# Patient Record
Sex: MALE | Race: WHITE | NOT HISPANIC OR LATINO | Employment: FULL TIME | URBAN - METROPOLITAN AREA
[De-identification: names, ages, dates, MRNs, and addresses within clinical notes are randomized per-mention and may not be internally consistent; named-entity substitution may affect disease eponyms.]

---

## 2020-10-19 ENCOUNTER — APPOINTMENT (EMERGENCY)
Dept: RADIOLOGY | Facility: HOSPITAL | Age: 30
End: 2020-10-19
Payer: COMMERCIAL

## 2020-10-19 ENCOUNTER — HOSPITAL ENCOUNTER (EMERGENCY)
Facility: HOSPITAL | Age: 30
Discharge: HOME | End: 2020-10-19
Attending: EMERGENCY MEDICINE | Admitting: EMERGENCY MEDICINE
Payer: COMMERCIAL

## 2020-10-19 VITALS
HEART RATE: 103 BPM | RESPIRATION RATE: 20 BRPM | BODY MASS INDEX: 32.44 KG/M2 | WEIGHT: 219 LBS | DIASTOLIC BLOOD PRESSURE: 77 MMHG | TEMPERATURE: 98.2 F | HEIGHT: 69 IN | SYSTOLIC BLOOD PRESSURE: 147 MMHG | OXYGEN SATURATION: 98 %

## 2020-10-19 DIAGNOSIS — R26.81 UNSTEADY GAIT: ICD-10-CM

## 2020-10-19 DIAGNOSIS — H53.8 BLURRY VISION, BILATERAL: ICD-10-CM

## 2020-10-19 DIAGNOSIS — R51.9 HEADACHE: Primary | ICD-10-CM

## 2020-10-19 LAB
ANION GAP SERPL CALCULATED.3IONS-SCNC: 7 MMOL/L (ref 4–13)
BASOPHILS # BLD AUTO: 0.07 THOUSANDS/ΜL (ref 0–0.1)
BASOPHILS NFR BLD AUTO: 1 % (ref 0–1)
BUN SERPL-MCNC: 17 MG/DL (ref 5–25)
CALCIUM SERPL-MCNC: 8.9 MG/DL (ref 8.3–10.1)
CHLORIDE SERPL-SCNC: 104 MMOL/L (ref 100–108)
CO2 SERPL-SCNC: 23 MMOL/L (ref 21–32)
CREAT SERPL-MCNC: 1.16 MG/DL (ref 0.6–1.3)
EOSINOPHIL # BLD AUTO: 0.12 THOUSAND/ΜL (ref 0–0.61)
EOSINOPHIL NFR BLD AUTO: 1 % (ref 0–6)
ERYTHROCYTE [DISTWIDTH] IN BLOOD BY AUTOMATED COUNT: 11.8 % (ref 11.6–15.1)
GFR SERPL CREATININE-BSD FRML MDRD: 83 ML/MIN/1.73SQ M
GLUCOSE SERPL-MCNC: 112 MG/DL (ref 65–140)
HCT VFR BLD AUTO: 50.4 % (ref 36.5–49.3)
HGB BLD-MCNC: 17.3 G/DL (ref 12–17)
IMM GRANULOCYTES # BLD AUTO: 0.04 THOUSAND/UL (ref 0–0.2)
IMM GRANULOCYTES NFR BLD AUTO: 0 % (ref 0–2)
LYMPHOCYTES # BLD AUTO: 2.6 THOUSANDS/ΜL (ref 0.6–4.47)
LYMPHOCYTES NFR BLD AUTO: 23 % (ref 14–44)
MCH RBC QN AUTO: 30.1 PG (ref 26.8–34.3)
MCHC RBC AUTO-ENTMCNC: 34.3 G/DL (ref 31.4–37.4)
MCV RBC AUTO: 88 FL (ref 82–98)
MONOCYTES # BLD AUTO: 0.6 THOUSAND/ΜL (ref 0.17–1.22)
MONOCYTES NFR BLD AUTO: 5 % (ref 4–12)
NEUTROPHILS # BLD AUTO: 7.9 THOUSANDS/ΜL (ref 1.85–7.62)
NEUTS SEG NFR BLD AUTO: 70 % (ref 43–75)
NRBC BLD AUTO-RTO: 0 /100 WBCS
PLATELET # BLD AUTO: 335 THOUSANDS/UL (ref 149–390)
PMV BLD AUTO: 8.8 FL (ref 8.9–12.7)
POTASSIUM SERPL-SCNC: 4.4 MMOL/L (ref 3.5–5.3)
RBC # BLD AUTO: 5.75 MILLION/UL (ref 3.88–5.62)
SODIUM SERPL-SCNC: 134 MMOL/L (ref 136–145)
WBC # BLD AUTO: 11.33 THOUSAND/UL (ref 4.31–10.16)

## 2020-10-19 PROCEDURE — 85025 COMPLETE CBC W/AUTO DIFF WBC: CPT | Performed by: EMERGENCY MEDICINE

## 2020-10-19 PROCEDURE — G1004 CDSM NDSC: HCPCS

## 2020-10-19 PROCEDURE — 80048 BASIC METABOLIC PNL TOTAL CA: CPT | Performed by: EMERGENCY MEDICINE

## 2020-10-19 PROCEDURE — 70496 CT ANGIOGRAPHY HEAD: CPT

## 2020-10-19 PROCEDURE — 99284 EMERGENCY DEPT VISIT MOD MDM: CPT

## 2020-10-19 PROCEDURE — 70498 CT ANGIOGRAPHY NECK: CPT

## 2020-10-19 PROCEDURE — 99284 EMERGENCY DEPT VISIT MOD MDM: CPT | Performed by: EMERGENCY MEDICINE

## 2020-10-19 PROCEDURE — 36415 COLL VENOUS BLD VENIPUNCTURE: CPT | Performed by: EMERGENCY MEDICINE

## 2020-10-19 RX ORDER — MECLIZINE HCL 12.5 MG/1
12.5 TABLET ORAL 3 TIMES DAILY PRN
Qty: 10 TABLET | Refills: 0 | Status: SHIPPED | OUTPATIENT
Start: 2020-10-19 | End: 2022-03-24

## 2020-10-19 RX ADMIN — IOHEXOL 85 ML: 350 INJECTION, SOLUTION INTRAVENOUS at 19:47

## 2020-10-20 ENCOUNTER — TELEPHONE (OUTPATIENT)
Dept: NEUROLOGY | Facility: CLINIC | Age: 30
End: 2020-10-20

## 2020-11-10 ENCOUNTER — CONSULT (OUTPATIENT)
Dept: NEUROLOGY | Facility: CLINIC | Age: 30
End: 2020-11-10
Payer: COMMERCIAL

## 2020-11-10 ENCOUNTER — TELEPHONE (OUTPATIENT)
Dept: NEUROLOGY | Facility: CLINIC | Age: 30
End: 2020-11-10

## 2020-11-10 VITALS
DIASTOLIC BLOOD PRESSURE: 88 MMHG | SYSTOLIC BLOOD PRESSURE: 122 MMHG | HEART RATE: 122 BPM | TEMPERATURE: 97.8 F | BODY MASS INDEX: 33 KG/M2 | WEIGHT: 222.8 LBS | HEIGHT: 69 IN

## 2020-11-10 DIAGNOSIS — E87.8 DISEQUILIBRIUM SYNDROME: ICD-10-CM

## 2020-11-10 DIAGNOSIS — H53.9 VISUAL DISTURBANCE: Primary | ICD-10-CM

## 2020-11-10 DIAGNOSIS — Z82.0 FAMILY HISTORY OF MYASTHENIA GRAVIS: ICD-10-CM

## 2020-11-10 DIAGNOSIS — H53.2 DOUBLE VISION WITH BOTH EYES OPEN: ICD-10-CM

## 2020-11-10 DIAGNOSIS — G43.109 OCULAR MIGRAINE: ICD-10-CM

## 2020-11-10 PROCEDURE — 99205 OFFICE O/P NEW HI 60 MIN: CPT | Performed by: PSYCHIATRY & NEUROLOGY

## 2020-11-13 ENCOUNTER — APPOINTMENT (OUTPATIENT)
Dept: LAB | Facility: CLINIC | Age: 30
End: 2020-11-13
Payer: COMMERCIAL

## 2020-11-13 DIAGNOSIS — H53.2 DOUBLE VISION WITH BOTH EYES OPEN: ICD-10-CM

## 2020-11-13 DIAGNOSIS — H53.9 VISUAL DISTURBANCE: ICD-10-CM

## 2020-11-13 DIAGNOSIS — E87.8 DISEQUILIBRIUM SYNDROME: ICD-10-CM

## 2020-11-13 LAB
CRP SERPL QL: 3.5 MG/L
DEPRECATED AT III PPP: 97 % OF NORMAL (ref 92–136)
ERYTHROCYTE [SEDIMENTATION RATE] IN BLOOD: 13 MM/HOUR (ref 0–14)

## 2020-11-13 PROCEDURE — 85303 CLOT INHIBIT PROT C ACTIVITY: CPT

## 2020-11-13 PROCEDURE — 81240 F2 GENE: CPT

## 2020-11-13 PROCEDURE — 85732 THROMBOPLASTIN TIME PARTIAL: CPT

## 2020-11-13 PROCEDURE — 36415 COLL VENOUS BLD VENIPUNCTURE: CPT

## 2020-11-13 PROCEDURE — 85652 RBC SED RATE AUTOMATED: CPT

## 2020-11-13 PROCEDURE — 81241 F5 GENE: CPT

## 2020-11-13 PROCEDURE — 83519 RIA NONANTIBODY: CPT

## 2020-11-13 PROCEDURE — 85705 THROMBOPLASTIN INHIBITION: CPT

## 2020-11-13 PROCEDURE — 83516 IMMUNOASSAY NONANTIBODY: CPT

## 2020-11-13 PROCEDURE — 85613 RUSSELL VIPER VENOM DILUTED: CPT

## 2020-11-13 PROCEDURE — 85670 THROMBIN TIME PLASMA: CPT

## 2020-11-13 PROCEDURE — 86140 C-REACTIVE PROTEIN: CPT

## 2020-11-13 PROCEDURE — 86147 CARDIOLIPIN ANTIBODY EA IG: CPT

## 2020-11-13 PROCEDURE — 86146 BETA-2 GLYCOPROTEIN ANTIBODY: CPT

## 2020-11-13 PROCEDURE — 85300 ANTITHROMBIN III ACTIVITY: CPT

## 2020-11-13 PROCEDURE — 85306 CLOT INHIBIT PROT S FREE: CPT

## 2020-11-13 PROCEDURE — 84238 ASSAY NONENDOCRINE RECEPTOR: CPT

## 2020-11-13 PROCEDURE — 85305 CLOT INHIBIT PROT S TOTAL: CPT

## 2020-11-14 LAB
CARDIOLIPIN IGA SER IA-ACNC: <9 APL U/ML (ref 0–11)
CARDIOLIPIN IGG SER IA-ACNC: <9 GPL U/ML (ref 0–14)
CARDIOLIPIN IGM SER IA-ACNC: 11 MPL U/ML (ref 0–12)

## 2020-11-16 LAB
ACHR BIND AB SER-SCNC: 0.04 NMOL/L (ref 0–0.24)
B2 GLYCOPROT1 IGA SER-ACNC: <9 GPI IGA UNITS (ref 0–25)
B2 GLYCOPROT1 IGG SER-ACNC: <9 GPI IGG UNITS (ref 0–20)
B2 GLYCOPROT1 IGM SER-ACNC: <9 GPI IGM UNITS (ref 0–32)
PROT C AG ACT/NOR PPP IA: >150 % OF NORMAL (ref 60–150)
PROT S ACT/NOR PPP: 133 % (ref 57–157)
PROT S PPP-ACNC: 134 % (ref 60–150)

## 2020-11-17 ENCOUNTER — TRANSCRIBE ORDERS (OUTPATIENT)
Dept: LAB | Facility: HOSPITAL | Age: 30
End: 2020-11-17

## 2020-11-17 DIAGNOSIS — H53.9 UNSPECIFIED VISUAL DISTURBANCE: Primary | ICD-10-CM

## 2020-11-17 DIAGNOSIS — H53.2 DIPLOPIA: ICD-10-CM

## 2020-11-17 DIAGNOSIS — E87.8 DILATED CARDIOMYOPATHY SECONDARY TO ELECTROLYTE DEFICIENCY (HCC): ICD-10-CM

## 2020-11-17 DIAGNOSIS — I43 DILATED CARDIOMYOPATHY SECONDARY TO ELECTROLYTE DEFICIENCY (HCC): ICD-10-CM

## 2020-11-18 ENCOUNTER — APPOINTMENT (OUTPATIENT)
Dept: LAB | Facility: CLINIC | Age: 30
End: 2020-11-18
Payer: COMMERCIAL

## 2020-11-18 DIAGNOSIS — H53.9 UNSPECIFIED VISUAL DISTURBANCE: ICD-10-CM

## 2020-11-18 DIAGNOSIS — I43 DILATED CARDIOMYOPATHY SECONDARY TO ELECTROLYTE DEFICIENCY (HCC): ICD-10-CM

## 2020-11-18 DIAGNOSIS — H53.2 DIPLOPIA: ICD-10-CM

## 2020-11-18 DIAGNOSIS — E87.8 DILATED CARDIOMYOPATHY SECONDARY TO ELECTROLYTE DEFICIENCY (HCC): ICD-10-CM

## 2020-11-18 LAB
APTT SCREEN TO CONFIRM RATIO: 1.05 RATIO (ref 0–1.4)
CONFIRM APTT/NORMAL: 34.3 SEC (ref 0–55)
DRVVT IMM 1:2 NP PPP: 42.5 SEC (ref 0–47)
F2 GENE MUT ANL BLD/T: ABNORMAL
LA PPP-IMP: ABNORMAL
SCREEN APTT: 43.8 SEC (ref 0–51.9)
SCREEN DRVVT: 50.2 SEC (ref 0–47)
THROMBIN TIME: 18.4 SEC (ref 0–23)

## 2020-11-18 PROCEDURE — 85306 CLOT INHIBIT PROT S FREE: CPT

## 2020-11-18 PROCEDURE — 36415 COLL VENOUS BLD VENIPUNCTURE: CPT

## 2020-11-19 ENCOUNTER — NURSE TRIAGE (OUTPATIENT)
Dept: OTHER | Facility: OTHER | Age: 30
End: 2020-11-19

## 2020-11-19 DIAGNOSIS — Z20.828 SARS-ASSOCIATED CORONAVIRUS EXPOSURE: Primary | ICD-10-CM

## 2020-11-19 DIAGNOSIS — D68.52 PROTHROMBIN GENE MUTATION (HCC): Primary | ICD-10-CM

## 2020-11-19 DIAGNOSIS — Z20.828 SARS-ASSOCIATED CORONAVIRUS EXPOSURE: ICD-10-CM

## 2020-11-19 LAB
F5 GENE MUT ANL BLD/T: NORMAL
PROT S ACT/NOR PPP: 138 % (ref 71–117)

## 2020-11-19 PROCEDURE — U0003 INFECTIOUS AGENT DETECTION BY NUCLEIC ACID (DNA OR RNA); SEVERE ACUTE RESPIRATORY SYNDROME CORONAVIRUS 2 (SARS-COV-2) (CORONAVIRUS DISEASE [COVID-19]), AMPLIFIED PROBE TECHNIQUE, MAKING USE OF HIGH THROUGHPUT TECHNOLOGIES AS DESCRIBED BY CMS-2020-01-R: HCPCS | Performed by: FAMILY MEDICINE

## 2020-11-20 ENCOUNTER — TELEPHONE (OUTPATIENT)
Dept: SURGICAL ONCOLOGY | Facility: CLINIC | Age: 30
End: 2020-11-20

## 2020-11-20 ENCOUNTER — TELEPHONE (OUTPATIENT)
Dept: NEUROLOGY | Facility: CLINIC | Age: 30
End: 2020-11-20

## 2020-11-21 LAB — SARS-COV-2 RNA SPEC QL NAA+PROBE: NOT DETECTED

## 2020-11-23 LAB — MUSK AB SER IA-ACNC: <1 U/ML

## 2020-11-24 LAB — ACHR BLOCK AB/ACHR TOTAL SFR SER: 11 % (ref 0–25)

## 2020-11-25 LAB — ACHR MOD AB/ACHR TOTAL SFR SER: <12 % (ref 0–20)

## 2020-12-10 ENCOUNTER — TELEPHONE (OUTPATIENT)
Dept: NEUROLOGY | Facility: CLINIC | Age: 30
End: 2020-12-10

## 2020-12-10 DIAGNOSIS — G43.109 OCULAR MIGRAINE: Primary | ICD-10-CM

## 2020-12-22 ENCOUNTER — CONSULT (OUTPATIENT)
Dept: HEMATOLOGY ONCOLOGY | Facility: CLINIC | Age: 30
End: 2020-12-22
Payer: COMMERCIAL

## 2020-12-22 VITALS
TEMPERATURE: 97.2 F | BODY MASS INDEX: 32.38 KG/M2 | SYSTOLIC BLOOD PRESSURE: 130 MMHG | OXYGEN SATURATION: 97 % | WEIGHT: 218.6 LBS | HEIGHT: 69 IN | DIASTOLIC BLOOD PRESSURE: 84 MMHG | RESPIRATION RATE: 18 BRPM | HEART RATE: 113 BPM

## 2020-12-22 DIAGNOSIS — D68.52 PROTHROMBIN GENE MUTATION (HCC): ICD-10-CM

## 2020-12-22 DIAGNOSIS — D75.1 ERYTHROCYTOSIS: Primary | ICD-10-CM

## 2020-12-22 PROCEDURE — 99244 OFF/OP CNSLTJ NEW/EST MOD 40: CPT | Performed by: PHYSICIAN ASSISTANT

## 2020-12-22 RX ORDER — ASPIRIN 81 MG/1
81 TABLET, CHEWABLE ORAL DAILY
COMMUNITY
End: 2022-03-24

## 2020-12-24 ENCOUNTER — APPOINTMENT (OUTPATIENT)
Dept: LAB | Facility: CLINIC | Age: 30
End: 2020-12-24
Payer: COMMERCIAL

## 2020-12-24 DIAGNOSIS — D75.1 ERYTHROCYTOSIS: ICD-10-CM

## 2020-12-24 LAB
BASOPHILS # BLD AUTO: 0.05 THOUSANDS/ΜL (ref 0–0.1)
BASOPHILS NFR BLD AUTO: 1 % (ref 0–1)
EOSINOPHIL # BLD AUTO: 0.19 THOUSAND/ΜL (ref 0–0.61)
EOSINOPHIL NFR BLD AUTO: 2 % (ref 0–6)
ERYTHROCYTE [DISTWIDTH] IN BLOOD BY AUTOMATED COUNT: 11.8 % (ref 11.6–15.1)
HCT VFR BLD AUTO: 48.9 % (ref 36.5–49.3)
HGB BLD-MCNC: 15.9 G/DL (ref 12–17)
IMM GRANULOCYTES # BLD AUTO: 0.04 THOUSAND/UL (ref 0–0.2)
IMM GRANULOCYTES NFR BLD AUTO: 0 % (ref 0–2)
LYMPHOCYTES # BLD AUTO: 2.64 THOUSANDS/ΜL (ref 0.6–4.47)
LYMPHOCYTES NFR BLD AUTO: 28 % (ref 14–44)
MCH RBC QN AUTO: 29.1 PG (ref 26.8–34.3)
MCHC RBC AUTO-ENTMCNC: 32.5 G/DL (ref 31.4–37.4)
MCV RBC AUTO: 89 FL (ref 82–98)
MONOCYTES # BLD AUTO: 0.56 THOUSAND/ΜL (ref 0.17–1.22)
MONOCYTES NFR BLD AUTO: 6 % (ref 4–12)
NEUTROPHILS # BLD AUTO: 5.93 THOUSANDS/ΜL (ref 1.85–7.62)
NEUTS SEG NFR BLD AUTO: 63 % (ref 43–75)
NRBC BLD AUTO-RTO: 0 /100 WBCS
PLATELET # BLD AUTO: 351 THOUSANDS/UL (ref 149–390)
PMV BLD AUTO: 9.4 FL (ref 8.9–12.7)
RBC # BLD AUTO: 5.47 MILLION/UL (ref 3.88–5.62)
WBC # BLD AUTO: 9.41 THOUSAND/UL (ref 4.31–10.16)

## 2020-12-24 PROCEDURE — 85025 COMPLETE CBC W/AUTO DIFF WBC: CPT

## 2020-12-24 PROCEDURE — 36415 COLL VENOUS BLD VENIPUNCTURE: CPT

## 2020-12-24 RX ORDER — PROCHLORPERAZINE MALEATE 10 MG
10 TABLET ORAL EVERY 8 HOURS PRN
Qty: 30 TABLET | Refills: 0 | Status: SHIPPED | OUTPATIENT
Start: 2020-12-24 | End: 2022-03-24

## 2020-12-24 RX ORDER — KETOROLAC TROMETHAMINE 10 MG/1
10 TABLET, FILM COATED ORAL 2 TIMES DAILY
Qty: 10 TABLET | Refills: 1 | Status: SHIPPED | OUTPATIENT
Start: 2020-12-24 | End: 2022-03-24

## 2021-01-05 LAB — MISCELLANEOUS LAB TEST RESULT: NORMAL

## 2021-01-25 ENCOUNTER — TELEPHONE (OUTPATIENT)
Dept: HEMATOLOGY ONCOLOGY | Facility: CLINIC | Age: 31
End: 2021-01-25

## 2021-01-25 DIAGNOSIS — D75.1 ERYTHROCYTOSIS: Primary | ICD-10-CM

## 2021-01-25 NOTE — TELEPHONE ENCOUNTER
LVM advising patient to have his labs drawn at any [facility]'s lab today in anticipation of his follow up appointment tomorrow 1/26/2021 @ 11:30 am     Lab order for cbc w/ differential generated

## 2021-01-26 ENCOUNTER — TELEMEDICINE (OUTPATIENT)
Dept: HEMATOLOGY ONCOLOGY | Facility: CLINIC | Age: 31
End: 2021-01-26
Payer: COMMERCIAL

## 2021-01-26 DIAGNOSIS — D68.52 PROTHROMBIN GENE MUTATION (HCC): Primary | ICD-10-CM

## 2021-01-26 DIAGNOSIS — R79.89 ABNORMAL CBC: ICD-10-CM

## 2021-01-26 PROCEDURE — 99212 OFFICE O/P EST SF 10 MIN: CPT | Performed by: PHYSICIAN ASSISTANT

## 2021-01-26 NOTE — PROGRESS NOTES
Virtual Brief Visit    Assessment/Plan:    Problem List Items Addressed This Visit     None                Reason for visit is No chief complaint on file  Encounter provider Harika Arriaza PA-C    Provider located at [address] [place] [id number]    Recent Visits  No visits were found meeting these conditions  Showing recent visits within past 7 days and meeting all other requirements     Today's Visits  Date Type Provider Dept   01/26/21 Telemedicine Nehal Summers PA-C [facility]   Showing today's visits and meeting all other requirements     Future Appointments  No visits were found meeting these conditions  Showing future appointments within next 150 days and meeting all other requirements        After connecting through telephone, the patient was identified by name and date of birth  Piotr Manuel was informed that this is a telemedicine visit and that the visit is being conducted through telephone  My office door was closed  No one else was in the room  He acknowledged consent and understanding of privacy and security of the platform  The patient has agreed to participate and understands he can discontinue the visit at any time  Patient is aware this is a billable service  Subjective    Piotr Manuel is a 31 y o  male presents for follow up  He was seen in consult in Dec 2020 due to abnormal thrombosis panel  He was seen by neurologist in November 2020 due to episode of blurry vision, unsteady gait, headache  Workup included a thrombosis panel  This showed heterozygous prothrombin gene mutation  Additionally when he was in the emergency department in November 2020 his red count was elevated and white count was elevated therefore he was recommended to have repeat CBC in follow-up today to review      Past Medical History:   Diagnosis Date    Migraine Past Surgical History:   Procedure Laterality Date    KNEE ARTHROSCOPY W/ ACL RECONSTRUCTION      has had it three times between two knees       Current Outpatient Medications   Medication Sig Dispense Refill    aspirin 81 mg chewable tablet Chew 81 mg daily      ketorolac (TORADOL) 10 mg tablet Take 1 tablet (10 mg total) by mouth 2 (two) times a day 10 tablet 1    meclizine (ANTIVERT) 12 5 MG tablet Take 1 tablet (12 5 mg total) by mouth 3 (three) times a day as needed for dizziness (Patient not taking: Reported on 11/10/2020) 10 tablet 0    prochlorperazine (COMPAZINE) 10 mg tablet Take 1 tablet (10 mg total) by mouth every 8 (eight) hours as needed for nausea (migraine) Take with Decadron 30 tablet 0     No current facility-administered medications for this visit  Allergies   Allergen Reactions    Sulfa Antibiotics      Review of Systems   Constitutional: Negative for appetite change, chills, fatigue, fever and unexpected weight change  Respiratory: Negative for cough and shortness of breath  Cardiovascular: Negative for chest pain, palpitations and leg swelling  Gastrointestinal: Negative for abdominal pain, constipation, diarrhea, nausea and vomiting  Genitourinary: Negative for difficulty urinating, dysuria and hematuria  Musculoskeletal: Negative for arthralgias  Skin: Negative  Neurological: Positive for headaches  Negative for dizziness, weakness, light-headedness and numbness  Vertigo   Hematological: Negative  Psychiatric/Behavioral: Negative  headache was not present for 1 month; lasts for a few days in a row     There were no vitals filed for this visit  1  Prothrombin gene mutation [facility])  Patient was found to have heterozygous prothrombin gene mutation on thrombosis panel  We again reviewed that this does increase risk of thromboses  However he has had this his entire life and has never had a thromboses yet  He is advised to limit inactivity    He should walk every 2 hours  We reviewed signs and symptoms of DVT and PE and to report to hospital if were to happen  He asked about future children  Children should be tested  He also disclosed to parents and siblings  2  Abnormal CBC  Patient presents for follow-up regarding previous history of abnormal CBC  When the ED his WBC and RBC was elevated  On repeat this has resolved  Likely this is attributable to stress and dehydration  No further workup is needed as CBC is now normal     I spent 15 minutes directly with the patient during this visit     VIRTUAL VISIT DISCLAIMER    Jack Aguirre acknowledges that he has consented to an online visit or consultation  He understands that the online visit is based solely on information provided by him, and that, in the absence of a face-to-face physical evaluation by the physician, the diagnosis he receives is both limited and provisional in terms of accuracy and completeness  This is not intended to replace a full medical face-to-face evaluation by the physician  Jack Aguirre understands and accepts these terms

## 2021-01-27 PROBLEM — D68.52 PROTHROMBIN GENE MUTATION (HCC): Status: ACTIVE | Noted: 2021-01-27

## 2021-03-10 DIAGNOSIS — Z23 ENCOUNTER FOR IMMUNIZATION: ICD-10-CM

## 2022-03-24 ENCOUNTER — OFFICE VISIT (OUTPATIENT)
Dept: FAMILY MEDICINE CLINIC | Facility: CLINIC | Age: 32
End: 2022-03-24
Payer: COMMERCIAL

## 2022-03-24 VITALS
SYSTOLIC BLOOD PRESSURE: 138 MMHG | HEIGHT: 69 IN | HEART RATE: 102 BPM | OXYGEN SATURATION: 99 % | RESPIRATION RATE: 16 BRPM | TEMPERATURE: 96.5 F | DIASTOLIC BLOOD PRESSURE: 88 MMHG | BODY MASS INDEX: 34.3 KG/M2 | WEIGHT: 231.6 LBS

## 2022-03-24 DIAGNOSIS — R26.89 BALANCE PROBLEM: ICD-10-CM

## 2022-03-24 DIAGNOSIS — E66.9 OBESITY (BMI 30-39.9): ICD-10-CM

## 2022-03-24 DIAGNOSIS — H93.11 TINNITUS OF RIGHT EAR: Primary | ICD-10-CM

## 2022-03-24 PROCEDURE — 99203 OFFICE O/P NEW LOW 30 MIN: CPT | Performed by: FAMILY MEDICINE

## 2022-03-24 PROCEDURE — 3725F SCREEN DEPRESSION PERFORMED: CPT | Performed by: FAMILY MEDICINE

## 2022-03-24 PROCEDURE — 1036F TOBACCO NON-USER: CPT | Performed by: FAMILY MEDICINE

## 2022-03-24 PROCEDURE — 3008F BODY MASS INDEX DOCD: CPT | Performed by: FAMILY MEDICINE

## 2022-03-24 NOTE — PROGRESS NOTES
Assessment/Plan:  Chief Complaint   Patient presents with    Establish Care     Pt has ringing in his right ear      Patient Instructions   Here for right ear tiniitus/whooshing sounds and will rec formal ENT evaluation for this as it started 5-6 weeks inconsistent but daily  Call if any problems  Hx of balance issues in past          No problem-specific Assessment & Plan notes found for this encounter  Diagnoses and all orders for this visit:    Tinnitus of right ear          Subjective:      Patient ID: Aaron Barrow is a 34 y o  male  Establish Care (Pt has ringing in his right ear )  Here to [facility] and last PCP in [address] [place] [address]  Here for right ear and no pain associated, no dizziness associated  No headaches  No discharge and no pain in right ear and left ear is wnl  Currently on no meds  Patient does not drink much coffee  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  No hx of myringotomy tubes  The following portions of the patient's history were reviewed and updated as appropriate: allergies, current medications, past family history, past medical history, past social history, past surgical history and problem list     Review of Systems   Constitutional: Negative  HENT: Positive for tinnitus (right ear)  Eyes: Negative  Respiratory: Negative  Cardiovascular: Negative  Gastrointestinal: Negative  Endocrine: Negative  Genitourinary: Negative  Musculoskeletal: Negative  Skin: Negative  Allergic/Immunologic: Negative  Neurological: Negative  Hematological: Negative  Psychiatric/Behavioral: Negative  Objective:      /88   Pulse 102   Temp (!) 96 5 °F (35 8 °C) (Temporal)   Resp 16   Ht 5' 8 5" (1 74 m)   Wt 105 kg (231 lb 9 6 oz)   SpO2 99%   BMI 34 70 kg/m²          Physical Exam  Constitutional:       Appearance: He is well-developed  HENT:      Head: Normocephalic and atraumatic        Right Ear: External ear normal  Left Ear: External ear normal       Nose: Nose normal    Eyes:      Conjunctiva/sclera: Conjunctivae normal       Pupils: Pupils are equal, round, and reactive to light  Cardiovascular:      Rate and Rhythm: Normal rate and regular rhythm  Heart sounds: Normal heart sounds  Pulmonary:      Effort: Pulmonary effort is normal       Breath sounds: Normal breath sounds  Musculoskeletal:         General: Normal range of motion  Cervical back: Normal range of motion and neck supple  Skin:     General: Skin is warm and dry  Neurological:      Mental Status: He is alert and oriented to person, place, and time  Deep Tendon Reflexes: Reflexes are normal and symmetric     Psychiatric:         Behavior: Behavior normal

## 2022-03-24 NOTE — PROGRESS NOTES
BMI Counseling: Body mass index is 34 7 kg/m²  The BMI is above normal  Nutrition recommendations include reducing portion sizes, decreasing overall calorie intake, 3-5 servings of fruits/vegetables daily, reducing fast food intake, consuming healthier snacks and decreasing soda and/or juice intake  Exercise recommendations include exercising 3-5 times per week

## 2022-03-24 NOTE — PATIENT INSTRUCTIONS
Here for right ear tiniitus/whooshing sounds and will rec formal ENT evaluation for this as it started 5-6 weeks inconsistent but daily  Call if any problems  Hx of balance issues in past  Schedule general PE as directed  Has a hx of balance issues and will rec evaluation with ENT

## 2022-04-18 ENCOUNTER — EVALUATION (OUTPATIENT)
Dept: PHYSICAL THERAPY | Facility: REHABILITATION | Age: 32
End: 2022-04-18
Payer: COMMERCIAL

## 2022-04-18 DIAGNOSIS — R42 DIZZINESS: Primary | ICD-10-CM

## 2022-04-18 DIAGNOSIS — R26.89 BALANCE PROBLEM: ICD-10-CM

## 2022-04-18 PROCEDURE — 97162 PT EVAL MOD COMPLEX 30 MIN: CPT | Performed by: PHYSICAL THERAPIST

## 2022-04-18 NOTE — PROGRESS NOTES
PT Evaluation     Today's date: 2022  Patient name: Marcello Lozano  : 1990  MRN: [id number]  Referring provider: Karo Quick DO  Dx:   Encounter Diagnosis     ICD-10-CM    1  Dizziness  R42 Ambulatory Referral to Physical Therapy   2  Balance problem  R26 89 Ambulatory Referral to Physical Therapy                  Assessment  Assessment details: Judy Pichardo reports to PT with CC of episodic dizziness along with tinnitus  Results of evaluation indicate normal findings in the following areas: oculomotor coordination, VOR and VOR cancellation, dynamic visual acuity, and dynamic balance  Negative for BPPV today  Unable to recreate symptoms in clinic today  At this time he demonstrates no vestibular impairments  Unfortunately he would not benefit from skilled PT at this time  Recommended he follow up with ENT and be seen for new PT evaluation if symptoms return in the future  Understanding of Dx/Px/POC: good  Goals  NA    Plan  Plan details: PT not indicated at this time  Plan of Care beginning date: 2022  Plan of Care expiration date: 2022  Treatment plan discussed with: patient        Subjective Evaluation    History of Present Illness  Mechanism of injury: For over 1 year now Judy Pichardo has been experiencing dizziness and ringing coming from his R ear  Pt reports he was dx with puslitile tinnitus at ENT  Has not had a VNG study  States his dizziness is very random at this time  Preivously he would have dizziness that would be worse with movement  As of today he feels almost 100% in terms of dizziness, is not currently in a episode  Duration has been highly variable, can be for seconds or for over a week  Describes as dysequilibrium  Overall symptoms improving on average over the past year  Denies any consistent or significant issues with neck pain or headaches  He does report ocular migraines which are not associated with dizziness       When having dizziness reports difficulty with walking around his home, could not go into work  Works as an  at [employer]               Objective    BP: 120/78  HR: 92  O2: 97%         Concurrent Complaints:  Tinnitus:Yes  Aural Fullness:No  Known hearing loss:No normal audiology testing   Nausea, Vomiting: No  Altered Vision: No  Poor Concentration: No  Memory Loss: No  Peripheral Neuropathy:No  Cervical Pain: No   Headache: No      VESTIBULAR OCULAR SCREEN:  Oculomotor ROM : WNL  Resting nystagmus: No  Gaze holding nystagmus No   Smooth pursuit Normal    Vertical Saccades:Normal  Horizontal Saccades:Normal  Convergence: Normal    Cover/Uncover/Crosscover Test: Normal    Head thrust (room light): Normal slight lag to the L     VOR cancellation: neg    VOR x1 test: neg    Dynamic Visual Acuity: adequate 2 line difference   Static Head: 20/20  Dynamic Head: 20/40    Cervical AROM:      Flexion WFL    Extension WFL     Right  Left   Rotation  [facility]   Side Bending  [facility] WF       Positional testing: Right Left   Wilma-Roy [place]y [place]  neg neg   Side lying [place]-Roy Bets[place]     Roll test: neg neg         FGA: 30/30         Precautions: none       Manuals                                                                 Neuro Re-Ed                                                                                                        Ther Ex                                                                                                                     Ther Activity                                       Gait Training                                       Modalities

## 2023-01-16 ENCOUNTER — OFFICE VISIT (OUTPATIENT)
Dept: URGENT CARE | Facility: MEDICAL CENTER | Age: 33
End: 2023-01-16

## 2023-01-16 VITALS
OXYGEN SATURATION: 97 % | HEART RATE: 118 BPM | RESPIRATION RATE: 18 BRPM | TEMPERATURE: 98.8 F | DIASTOLIC BLOOD PRESSURE: 88 MMHG | SYSTOLIC BLOOD PRESSURE: 142 MMHG

## 2023-01-16 DIAGNOSIS — N45.1 EPIDIDYMITIS, LEFT: Primary | ICD-10-CM

## 2023-01-16 DIAGNOSIS — R10.9 FLANK PAIN: ICD-10-CM

## 2023-01-16 LAB
SL AMB  POCT GLUCOSE, UA: ABNORMAL
SL AMB LEUKOCYTE ESTERASE,UA: ABNORMAL
SL AMB POCT BILIRUBIN,UA: ABNORMAL
SL AMB POCT BLOOD,UA: ABNORMAL
SL AMB POCT CLARITY,UA: CLEAR
SL AMB POCT COLOR,UA: YELLOW
SL AMB POCT KETONES,UA: ABNORMAL
SL AMB POCT NITRITE,UA: ABNORMAL
SL AMB POCT PH,UA: 5
SL AMB POCT SPECIFIC GRAVITY,UA: 1.03
SL AMB POCT URINE PROTEIN: 30
SL AMB POCT UROBILINOGEN: ABNORMAL

## 2023-01-16 RX ORDER — DOXYCYCLINE 100 MG/1
100 TABLET ORAL 2 TIMES DAILY
Qty: 20 TABLET | Refills: 0 | Status: SHIPPED | OUTPATIENT
Start: 2023-01-16 | End: 2023-01-26

## 2023-01-16 RX ORDER — COVID-19 ANTIGEN TEST
KIT MISCELLANEOUS
COMMUNITY
Start: 2023-01-03

## 2023-01-16 RX ORDER — NAPROXEN 500 MG/1
500 TABLET ORAL 2 TIMES DAILY WITH MEALS
Qty: 30 TABLET | Refills: 0 | Status: SHIPPED | OUTPATIENT
Start: 2023-01-16

## 2023-01-16 NOTE — PATIENT INSTRUCTIONS
Jock strap for comfort  Antibiotic- doxycycline to take as directed  Naproxen to take as directed  Your urine sample was sent for testing by our labs  Please follow on MyChart for results, otherwise someone from the office will be in touch     Follow up with your PCP in 3-5 days  Proceed to ER if symptoms worsen

## 2023-01-16 NOTE — LETTER
January 16, 2023     Patient: Abigail Scott   YOB: 1990   Date of Visit: 1/16/2023       To Whom it May Concern:    Kimberly Ze was seen in my clinic on 1/16/2023  He may return to work on 1/16/2023  If you have any questions or concerns, please don't hesitate to call           Sincerely,          Timo Barrios PA-C        CC: No Recipients

## 2023-01-16 NOTE — PROGRESS NOTES
[id number]        NAME: Yecenia Torres is a 34 y o  male  : 1990    MRN: [id number]  DATE: 2023  TIME: 11:24 AM    Assessment and Plan   Epididymitis, left [N45 1]  1  Epididymitis, left  doxycycline (ADOXA) 100 MG tablet    naproxen (Naprosyn) 500 mg tablet    Chlamydia/Gonococcus/Genital Mycoplasma Profile, RAYNA, Urine      2  Flank pain  POCT urine dip            Patient Instructions     Patient provided with information regarding pathophysiology of epididymitis  Jock strap for comfort  Antibiotic- doxycycline to take as directed  Naproxen to take as directed  Your urine sample was sent for testing by our labs  Please follow on MyChart for results, otherwise someone from the office will be in touch  Follow up with your PCP in 3-5 days   Proceed to ER if symptoms worsen    Chief Complaint     Chief Complaint   Patient presents with   • Flank Pain     Patient c/o Left side flank pain and testicle pain that started this morning  History of Present Illness       34 YOM woke this morning with sudden new onset left sided back/flank pain  He though perhaps he had strained a muscle and took a dose of ibuprofen, which did not help his symptoms  The patient denies any recent injuries, falls, twist, slips, or any heavy lifting, pushing, or pulling activities in the past couple days  The patient admits that a few days ago he experienced discomfort in his left testicle, which seem to have resolved, though did return this morning as well  He denies any history of testicular issues, denies any swelling, numbness, or tingling  He notes that he has never had pain like this before  He rates the flank pain a 6 out of 10 on the pain scale  It is a constant, throbbing pain that does not radiate  Nothing seems to make the pain worse or better    Patient notes that this morning he felt as though he had chills, though his temperature, when checked, was normal   He denies nausea or vomiting, but does admit to a few episodes of diarrhea this morning  He states that a couple times when he felt he had to have a bowel movement, he was unable to go  Patient denies any recent sick contacts  Review of Systems   Review of Systems   Constitutional: Positive for chills  Negative for fever  HENT: Negative for ear pain and sore throat  Eyes: Negative for pain and visual disturbance  Respiratory: Negative for cough and shortness of breath  Cardiovascular: Negative for chest pain and palpitations  Gastrointestinal: Positive for diarrhea  Negative for abdominal pain, nausea and vomiting  Genitourinary: Positive for flank pain and testicular pain (Left testicle)  Negative for dysuria and hematuria  Musculoskeletal: Negative for arthralgias  Skin: Negative for color change and rash  Neurological: Negative for seizures, syncope and numbness  Psychiatric/Behavioral: Negative  All other systems reviewed and are negative          Current Medications       Current Outpatient Medications:   •  doxycycline (ADOXA) 100 MG tablet, Take 1 tablet (100 mg total) by mouth 2 (two) times a day for 10 days, Disp: 20 tablet, Rfl: 0  •  naproxen (Naprosyn) 500 mg tablet, Take 1 tablet (500 mg total) by mouth 2 (two) times a day with meals, Disp: 30 tablet, Rfl: 0  •  Flowflex COVID-19 Ag Home Test KIT, , Disp: , Rfl:     Current Allergies     Allergies as of 01/16/2023 - Reviewed 01/16/2023   Allergen Reaction Noted   • Sulfa antibiotics  10/19/2020            The following portions of the patient's history were reviewed and updated as appropriate: allergies, current medications, past family history, past medical history, past social history, past surgical history and problem list      Past Medical History:   Diagnosis Date   • Migraine        Past Surgical History:   Procedure Laterality Date   • KNEE ARTHROSCOPY W/ ACL RECONSTRUCTION      has had it three times between two knees Family History   Problem Relation Age of Onset   • No Known Problems Mother    • Heart disease Father    • Hypertension Father    • Myasthenia gravis Father    • Asthma Sister    • Asthma Brother    • Hypertension Maternal Grandmother    • Seizures Maternal Grandfather    • Glaucoma Maternal Grandfather    • Hypertension Maternal Grandfather    • Leukemia Maternal Grandfather    • Migraines Paternal Grandmother    • Breast cancer Paternal Grandmother    • Hypertension Paternal Grandfather          Medications have been verified  Objective   /88   Pulse (!) 118   Temp 98 8 °F (37 1 °C)   Resp 18   SpO2 97%        Physical Exam     Physical Exam  Vitals and nursing note reviewed  Chaperone present: Genital exam performed by Jozef Taylor PA-C  Constitutional:       General: He is not in acute distress  Appearance: Normal appearance  He is not ill-appearing  HENT:      Head: Normocephalic and atraumatic  Mouth/Throat:      Pharynx: Oropharynx is clear  Eyes:      Extraocular Movements: Extraocular movements intact  Pupils: Pupils are equal, round, and reactive to light  Cardiovascular:      Rate and Rhythm: Normal rate and regular rhythm  Pulses: Normal pulses  Pulmonary:      Effort: Pulmonary effort is normal  No respiratory distress  Breath sounds: Normal breath sounds  Abdominal:      General: Abdomen is flat  Bowel sounds are normal       Palpations: Abdomen is soft  Tenderness: There is no abdominal tenderness  There is no right CVA tenderness or left CVA tenderness  Genitourinary:     Penis: Normal        Testes:         Left: Tenderness or swelling not present  Epididymis:      Left: Tenderness present  Musculoskeletal:         General: Normal range of motion  Cervical back: Normal range of motion  Skin:     General: Skin is warm and dry  Capillary Refill: Capillary refill takes less than 2 seconds     Neurological: General: No focal deficit present  Mental Status: He is alert and oriented to person, place, and time     Psychiatric:         Mood and Affect: Mood normal          Behavior: Behavior normal

## 2023-01-17 LAB
C TRACH DNA SPEC QL NAA+PROBE: NEGATIVE
N GONORRHOEA DNA SPEC QL NAA+PROBE: NEGATIVE

## 2023-02-28 ENCOUNTER — OFFICE VISIT (OUTPATIENT)
Dept: URGENT CARE | Facility: MEDICAL CENTER | Age: 33
End: 2023-02-28

## 2023-02-28 VITALS
HEART RATE: 100 BPM | SYSTOLIC BLOOD PRESSURE: 140 MMHG | RESPIRATION RATE: 20 BRPM | DIASTOLIC BLOOD PRESSURE: 92 MMHG | TEMPERATURE: 98.7 F | OXYGEN SATURATION: 98 %

## 2023-02-28 DIAGNOSIS — N50.812 TESTICULAR PAIN, LEFT: Primary | ICD-10-CM

## 2023-02-28 LAB
SL AMB  POCT GLUCOSE, UA: NEGATIVE
SL AMB LEUKOCYTE ESTERASE,UA: NEGATIVE
SL AMB POCT BILIRUBIN,UA: NEGATIVE
SL AMB POCT BLOOD,UA: NEGATIVE
SL AMB POCT CLARITY,UA: CLEAR
SL AMB POCT COLOR,UA: YELLOW
SL AMB POCT KETONES,UA: NORMAL
SL AMB POCT NITRITE,UA: NEGATIVE
SL AMB POCT PH,UA: 5
SL AMB POCT SPECIFIC GRAVITY,UA: 1.03
SL AMB POCT URINE PROTEIN: NEGATIVE
SL AMB POCT UROBILINOGEN: 0.2

## 2023-02-28 RX ORDER — DOXYCYCLINE 100 MG/1
100 TABLET ORAL 2 TIMES DAILY
Qty: 20 TABLET | Refills: 0 | Status: SHIPPED | OUTPATIENT
Start: 2023-02-28 | End: 2023-03-10

## 2023-02-28 NOTE — PATIENT INSTRUCTIONS
- Take antibiotics as instructed  - Tylenol/NSAIDs for fever, pain  - Referral to Urology provided for further evaluation   - Proceed to ER if symptoms worsen, including but not limited to, blood in urine/stool, increasing abdominal pain/testicular pain, testicular swelling, inability to urinate, fever    - Follow up with PCP in 3-5 days

## 2023-02-28 NOTE — PROGRESS NOTES
[facility]        NAME: Jaince Griggs is a 34 y o  male  : 1990    MRN: [id number]  DATE: 2023  TIME: 9:35 AM    Assessment and Plan   Testicular pain, left [N50 [id number]]  1  Testicular pain, left  POCT urine dip    doxycycline (ADOXA) 100 MG tablet    Ambulatory Referral to Urology        POCT urine negative for UTI  Patient provided with repeat antibiotics as he is experiencing similar symptoms from his prior epididymitis, which resolved following antibiotic treatment  He was provided with strict ER instructions as well as a referral to urology for further evaluation  Patient Instructions     - Take antibiotics as instructed  - Tylenol/NSAIDs for fever, pain  - Referral to Urology provided for further evaluation   - Proceed to ER if symptoms worsen, including but not limited to, blood in urine/stool, increasing abdominal pain/testicular pain, testicular swelling, inability to urinate, fever  Follow up with PCP in 3-5 days  Chief Complaint     Chief Complaint   Patient presents with   • Testicle Pain     Pt C/O waking up this morning with left testicle pain  Pt states PMH  History of Present Illness       34-year-old male presents to the urgent care today for evaluation of chills, diarrhea, left testicle pain  Patient states that the symptoms began this morning  He was previously seen for similar symptoms on 2023 where he was diagnosed with epididymitis  At that time he was treated with doxycycline and provided with standard STD lab work-up, which was negative  The patient states that he completed the antibiotics as prescribed and had complete resolution of his symptoms  This morning, however, he woke up with chills, and stated that in the last 24 hours he feels like he has to go to the bathroom "more often than I should"  He denies any changes in his urinary stream   He denies any blood in his urine  He denies any discharge or rash    The patient notes some flank pain, but not as strong as last time  He states that he remains sexually active with 1 partner, who is the same since last visit  He denies any fever, nausea, vomiting  Patient states that the pain seems to radiate down to his posterior left testicle just like last time  Review of Systems   Review of Systems   Constitutional: Positive for chills  Negative for fever  HENT: Negative for ear pain and sore throat  Eyes: Negative for pain and visual disturbance  Respiratory: Negative for cough and shortness of breath  Cardiovascular: Negative for chest pain and palpitations  Gastrointestinal: Positive for diarrhea  Negative for abdominal pain, blood in stool, constipation, nausea and vomiting  Genitourinary: Positive for decreased urine volume, flank pain, testicular pain and urgency  Negative for dysuria, hematuria, penile discharge, penile pain, penile swelling and scrotal swelling  Musculoskeletal: Negative for arthralgias and back pain  Skin: Negative for color change and rash  Neurological: Negative for dizziness, seizures, syncope and numbness  Psychiatric/Behavioral: Negative  All other systems reviewed and are negative          Current Medications       Current Outpatient Medications:   •  doxycycline (ADOXA) 100 MG tablet, Take 1 tablet (100 mg total) by mouth 2 (two) times a day for 10 days, Disp: 20 tablet, Rfl: 0  •  naproxen (Naprosyn) 500 mg tablet, Take 1 tablet (500 mg total) by mouth 2 (two) times a day with meals, Disp: 30 tablet, Rfl: 0  •  Flowflex COVID-19 Ag Home Test KIT, , Disp: , Rfl:     Current Allergies     Allergies as of 02/28/2023 - Reviewed 02/28/2023   Allergen Reaction Noted   • Sulfa antibiotics  10/19/2020            The following portions of the patient's history were reviewed and updated as appropriate: allergies, current medications, past family history, past medical history, past social history, past surgical history and problem list      Past Medical History:   Diagnosis Date   • Migraine        Past Surgical History:   Procedure Laterality Date   • KNEE ARTHROSCOPY W/ ACL RECONSTRUCTION      has had it three times between two knees       Family History   Problem Relation Age of Onset   • No Known Problems Mother    • Heart disease Father    • Hypertension Father    • Myasthenia gravis Father    • Asthma Sister    • Asthma Brother    • Hypertension Maternal Grandmother    • Seizures Maternal Grandfather    • Glaucoma Maternal Grandfather    • Hypertension Maternal Grandfather    • Leukemia Maternal Grandfather    • Migraines Paternal Grandmother    • Breast cancer Paternal Grandmother    • Hypertension Paternal Grandfather          Medications have been verified  Objective   /92 (BP Location: Right arm, Patient Position: Sitting, Cuff Size: Large)   Pulse 100   Temp 98 7 °F (37 1 °C) (Tympanic)   Resp 20   SpO2 98%        Physical Exam     Physical Exam  Vitals and nursing note reviewed  Constitutional:       General: He is not in acute distress  Appearance: Normal appearance  He is not ill-appearing  HENT:      Head: Normocephalic and atraumatic  Nose: Nose normal       Mouth/Throat:      Pharynx: Oropharynx is clear  Eyes:      Extraocular Movements: Extraocular movements intact  Pupils: Pupils are equal, round, and reactive to light  Cardiovascular:      Rate and Rhythm: Normal rate and regular rhythm  Pulses: Normal pulses  Heart sounds: No murmur heard  Pulmonary:      Effort: Pulmonary effort is normal  No respiratory distress  Breath sounds: No wheezing or rhonchi  Abdominal:      General: Abdomen is flat  Bowel sounds are normal  There is no distension  Palpations: Abdomen is soft  There is no mass  Tenderness: There is no abdominal tenderness  There is no right CVA tenderness, left CVA tenderness, guarding or rebound  Hernia: No hernia is present  There is no hernia in the left inguinal area  Genitourinary:     Penis: Normal        Testes: Normal          Right: Mass, tenderness, testicular hydrocele or varicocele not present  Left: Mass, tenderness, testicular hydrocele or varicocele not present  Epididymis:      Right: Not inflamed or enlarged  No mass or tenderness  Left: Normal  Not inflamed or enlarged  No mass or tenderness  Comments: With consent from the patient genitourinary exam was completed  Musculoskeletal:         General: Normal range of motion  Cervical back: Normal range of motion  Skin:     General: Skin is warm and dry  Capillary Refill: Capillary refill takes less than 2 seconds  Neurological:      General: No focal deficit present  Mental Status: He is alert and oriented to person, place, and time     Psychiatric:         Mood and Affect: Mood normal          Behavior: Behavior normal

## 2023-02-28 NOTE — LETTER
February 28, 2023     Patient: Shakira Mcdonough   YOB: 1990   Date of Visit: 2/28/2023       To Whom It May Concern: It is my medical opinion that Ibis Bonilla may return to work on 3/1/2023  If you have any questions or concerns, please don't hesitate to call           Sincerely,        Daniel Dickey PA-C    CC: No Recipients

## 2023-10-05 ENCOUNTER — APPOINTMENT (EMERGENCY)
Dept: CT IMAGING | Facility: HOSPITAL | Age: 33
End: 2023-10-05
Payer: COMMERCIAL

## 2023-10-05 ENCOUNTER — HOSPITAL ENCOUNTER (EMERGENCY)
Facility: HOSPITAL | Age: 33
Discharge: HOME | End: 2023-10-05
Attending: EMERGENCY MEDICINE
Payer: COMMERCIAL

## 2023-10-05 VITALS
BODY MASS INDEX: 33.36 KG/M2 | HEART RATE: 68 BPM | DIASTOLIC BLOOD PRESSURE: 74 MMHG | OXYGEN SATURATION: 96 % | SYSTOLIC BLOOD PRESSURE: 127 MMHG | RESPIRATION RATE: 17 BRPM | TEMPERATURE: 98.6 F | WEIGHT: 222.66 LBS

## 2023-10-05 DIAGNOSIS — R10.9 FLANK PAIN: Primary | ICD-10-CM

## 2023-10-05 LAB
BILIRUB UR QL STRIP: NEGATIVE
CLARITY UR: CLEAR
COLOR UR: YELLOW
GLUCOSE UR STRIP-MCNC: NEGATIVE MG/DL
HGB UR QL STRIP.AUTO: NEGATIVE
KETONES UR STRIP-MCNC: NEGATIVE MG/DL
LEUKOCYTE ESTERASE UR QL STRIP: NEGATIVE
NITRITE UR QL STRIP: NEGATIVE
PH UR STRIP.AUTO: 5.5 [PH] (ref 4.5–8)
PROT UR STRIP-MCNC: NEGATIVE MG/DL
SP GR UR STRIP.AUTO: 1.02 (ref 1–1.03)
UROBILINOGEN UR QL STRIP.AUTO: 0.2 E.U./DL

## 2023-10-05 PROCEDURE — G1004 CDSM NDSC: HCPCS

## 2023-10-05 PROCEDURE — 99284 EMERGENCY DEPT VISIT MOD MDM: CPT

## 2023-10-05 PROCEDURE — 81003 URINALYSIS AUTO W/O SCOPE: CPT

## 2023-10-05 PROCEDURE — 74176 CT ABD & PELVIS W/O CONTRAST: CPT

## 2023-10-05 PROCEDURE — 99284 EMERGENCY DEPT VISIT MOD MDM: CPT | Performed by: EMERGENCY MEDICINE

## 2023-10-05 RX ORDER — NAPROXEN 500 MG/1
500 TABLET ORAL 2 TIMES DAILY WITH MEALS
Qty: 20 TABLET | Refills: 0 | Status: SHIPPED | OUTPATIENT
Start: 2023-10-05 | End: 2023-10-15

## 2023-10-05 NOTE — ED PROVIDER NOTES
History  Chief Complaint   Patient presents with   • Flank Pain     Pt reports right sided flank pain. No injury, no urinary symptoms. 29 YO male presents with Right flank pain for the last 2-3 weeks. Patient states this has been intermittent, not related to movement. It has been a dull/aching pain, non-radiating, no known exacerbating or alleviating factors. Patient states he originally thought this may be muscular but he notes it does not seem to be aggravated by movement. He has not taken any medications to treat this, denies associated nausea or dysuria. He states he currently has the pain but it is mild. Pt denies CP/SOB/F/C/N/V/D/C, no dysuria, burning on urination or blood in urine. History provided by:  Patient   used: No        Prior to Admission Medications   Prescriptions Last Dose Informant Patient Reported? Taking? Flowflex COVID-19 Ag Home Test KIT   Yes No   naproxen (Naprosyn) 500 mg tablet   No No   Sig: Take 1 tablet (500 mg total) by mouth 2 (two) times a day with meals      Facility-Administered Medications: None       Past Medical History:   Diagnosis Date   • Migraine        Past Surgical History:   Procedure Laterality Date   • KNEE ARTHROSCOPY W/ ACL RECONSTRUCTION      has had it three times between two knees       Family History   Problem Relation Age of Onset   • No Known Problems Mother    • Heart disease Father    • Hypertension Father    • Myasthenia gravis Father    • Asthma Sister    • Asthma Brother    • Hypertension Maternal Grandmother    • Seizures Maternal Grandfather    • Glaucoma Maternal Grandfather    • Hypertension Maternal Grandfather    • Leukemia Maternal Grandfather    • Migraines Paternal Grandmother    • Breast cancer Paternal Grandmother    • Hypertension Paternal Grandfather      I have reviewed and agree with the history as documented.     E-Cigarette/Vaping   • E-Cigarette Use Never User      E-Cigarette/Vaping Substances   • Nicotine No    • THC No    • CBD No    • Flavoring No    • Other No    • Unknown No      Social History     Tobacco Use   • Smoking status: Never   • Smokeless tobacco: Never   Vaping Use   • Vaping Use: Never used   Substance Use Topics   • Alcohol use: Yes     Comment: socially    • Drug use: Never       Review of Systems   Constitutional: Negative for fever. HENT: Negative for dental problem. Eyes: Negative for visual disturbance. Respiratory: Negative for shortness of breath. Cardiovascular: Negative for chest pain. Gastrointestinal: Negative for abdominal pain, nausea and vomiting. Genitourinary: Negative for dysuria and frequency. Musculoskeletal: Positive for back pain. Negative for neck pain and neck stiffness. Skin: Negative for rash. Neurological: Negative for dizziness, weakness and light-headedness. Psychiatric/Behavioral: Negative for agitation, behavioral problems and confusion. All other systems reviewed and are negative. Physical Exam  Physical Exam  Vitals and nursing note reviewed. Constitutional:       Appearance: He is well-developed. HENT:      Head: Normocephalic and atraumatic. Eyes:      Extraocular Movements: Extraocular movements intact. Cardiovascular:      Rate and Rhythm: Normal rate. Pulses: Normal pulses. Heart sounds: Normal heart sounds. Pulmonary:      Effort: Pulmonary effort is normal.      Breath sounds: Normal breath sounds. Abdominal:      General: There is no distension. Tenderness: There is right CVA tenderness. Musculoskeletal:         General: Normal range of motion. Cervical back: Normal range of motion. Skin:     Findings: No rash. Neurological:      Mental Status: He is alert and oriented to person, place, and time.    Psychiatric:         Behavior: Behavior normal.         Vital Signs  ED Triage Vitals   Temperature Pulse Respirations Blood Pressure SpO2   10/05/23 1310 10/05/23 1308 10/05/23 1308 10/05/23 1308 10/05/23 1308   98.6 °F (37 °C) 88 16 151/97 97 %      Temp Source Heart Rate Source Patient Position - Orthostatic VS BP Location FiO2 (%)   10/05/23 1310 10/05/23 1308 10/05/23 1308 10/05/23 1308 --   Oral Monitor Sitting Right arm       Pain Score       10/05/23 1308       3           Vitals:    10/05/23 1308 10/05/23 1506   BP: 151/97 127/74   Pulse: 88 68   Patient Position - Orthostatic VS: Sitting Lying         Visual Acuity      ED Medications  Medications - No data to display    Diagnostic Studies  Results Reviewed     Procedure Component Value Units Date/Time    Urine Macroscopic, POC [[id number]] Collected: 10/05/23 1331    Lab Status: Final result Specimen: Urine Updated: 10/05/23 1332     Color, UA Yellow     Clarity, UA Clear     pH, UA 5.5     Leukocytes, UA Negative     Nitrite, UA Negative     Protein, UA Negative mg/dl      Glucose, UA Negative mg/dl      Ketones, UA Negative mg/dl      Urobilinogen, UA 0.2 E.U./dl      Bilirubin, UA Negative     Occult Blood, UA Negative     Specific Gravity, UA 1.025    Narrative:      [facility] RESULT                 CT renal stone study abdomen pelvis without contrast   Final Result by Cata Fuentes MD (10/05 1537)      No CT evidence of nephrolithiasis or obstructive uropathy. Workstation performed: [id number]                    Procedures  Procedures         ED Course                                             Medical Decision Making  1. Flank pain - Intermittent for the last 3 weeks, will check urine for infection, CT to determine presence/location of kidney stone. Flank pain: acute illness or injury  Amount and/or Complexity of Data Reviewed  Radiology: ordered. Risk  Prescription drug management.           Disposition  Final diagnoses:   Flank pain     Time reflects when diagnosis was documented in both MDM as applicable and the Disposition within this note     Time User Action Codes Description Comment    10/5/2023  3:48 PM Yoselin Sol Add [R10.9] Flank pain       ED Disposition     ED Disposition   Discharge    Condition   Stable    Date/Time   Thu Oct 5, 2023  3:48 PM    Comment   [id number] discharge to home/self care. Follow-up Information     Follow up With Specialties Details Why [facility] [address] [facility], [facility]   [address] [address] [facility]  [phone]            Discharge Medication List as of 10/5/2023  3:49 PM      START taking these medications    Details   !! naproxen (NAPROSYN) 500 mg tablet Take 1 tablet (500 mg total) by mouth 2 (two) times a day with meals for 10 days, Starting Thu 10/5/2023, Until Sun 10/15/2023, Normal       !! - Potential duplicate medications found. Please discuss with provider. CONTINUE these medications which have NOT CHANGED    Details   Flowflex COVID-19 Ag Home Test KIT Starting Tue 1/3/2023, Historical Med      !! naproxen (Naprosyn) 500 mg tablet Take 1 tablet (500 mg total) by mouth 2 (two) times a day with meals, Starting Mon 1/16/2023, Normal       !! - Potential duplicate medications found. Please discuss with provider. No discharge procedures on file.     PDMP Review     None          ED Provider  Electronically Signed by           Rah Sosa MD  10/06/23 8181

## 2023-10-05 NOTE — Clinical Note
Ernie Syed was seen and treated in our emergency department on 10/5/2023. No restrictions            Diagnosis:     Fabio Gramajo  . He may return on this date: 10/06/2023         If you have any questions or concerns, please don't hesitate to call.       Bienvenido Ratliff MD    ______________________________           _______________          _______________  Hospital Representative                              Date                                Time

## 2023-10-05 NOTE — DISCHARGE INSTRUCTIONS
Take the Naprosyn twice daily for the next 5-10 days. Call your family doctor, let them know you have been having pain.

## 2023-10-11 ENCOUNTER — APPOINTMENT (OUTPATIENT)
Dept: LAB | Facility: CLINIC | Age: 33
End: 2023-10-11
Payer: COMMERCIAL

## 2023-10-11 ENCOUNTER — TELEPHONE (OUTPATIENT)
Dept: FAMILY MEDICINE CLINIC | Facility: CLINIC | Age: 33
End: 2023-10-11

## 2023-10-11 DIAGNOSIS — Z00.00 HEALTH CARE MAINTENANCE: ICD-10-CM

## 2023-10-11 DIAGNOSIS — Z13.220 SCREENING FOR HYPERLIPIDEMIA: ICD-10-CM

## 2023-10-11 DIAGNOSIS — Z13.220 SCREENING FOR HYPERLIPIDEMIA: Primary | ICD-10-CM

## 2023-10-11 LAB
ALBUMIN SERPL BCP-MCNC: 4.4 G/DL (ref 3.5–5)
ALP SERPL-CCNC: 44 U/L (ref 34–104)
ALT SERPL W P-5'-P-CCNC: 28 U/L (ref 7–52)
ANION GAP SERPL CALCULATED.3IONS-SCNC: 12 MMOL/L
AST SERPL W P-5'-P-CCNC: 24 U/L (ref 13–39)
BASOPHILS # BLD AUTO: 0.05 THOUSANDS/ÂΜL (ref 0–0.1)
BASOPHILS NFR BLD AUTO: 1 % (ref 0–1)
BILIRUB SERPL-MCNC: 0.52 MG/DL (ref 0.2–1)
BUN SERPL-MCNC: 16 MG/DL (ref 5–25)
CALCIUM SERPL-MCNC: 9.3 MG/DL (ref 8.4–10.2)
CHLORIDE SERPL-SCNC: 104 MMOL/L (ref 96–108)
CHOLEST SERPL-MCNC: 249 MG/DL
CO2 SERPL-SCNC: 24 MMOL/L (ref 21–32)
CREAT SERPL-MCNC: 1.06 MG/DL (ref 0.6–1.3)
EOSINOPHIL # BLD AUTO: 0.17 THOUSAND/ÂΜL (ref 0–0.61)
EOSINOPHIL NFR BLD AUTO: 2 % (ref 0–6)
ERYTHROCYTE [DISTWIDTH] IN BLOOD BY AUTOMATED COUNT: 11.9 % (ref 11.6–15.1)
GFR SERPL CREATININE-BSD FRML MDRD: 91 ML/MIN/1.73SQ M
GLUCOSE P FAST SERPL-MCNC: 85 MG/DL (ref 65–99)
HCT VFR BLD AUTO: 44.1 % (ref 36.5–49.3)
HDLC SERPL-MCNC: 43 MG/DL
HGB BLD-MCNC: 15.2 G/DL (ref 12–17)
IMM GRANULOCYTES # BLD AUTO: 0.03 THOUSAND/UL (ref 0–0.2)
IMM GRANULOCYTES NFR BLD AUTO: 0 % (ref 0–2)
LDLC SERPL CALC-MCNC: 171 MG/DL (ref 0–100)
LYMPHOCYTES # BLD AUTO: 2.14 THOUSANDS/ÂΜL (ref 0.6–4.47)
LYMPHOCYTES NFR BLD AUTO: 24 % (ref 14–44)
MCH RBC QN AUTO: 30.3 PG (ref 26.8–34.3)
MCHC RBC AUTO-ENTMCNC: 34.5 G/DL (ref 31.4–37.4)
MCV RBC AUTO: 88 FL (ref 82–98)
MONOCYTES # BLD AUTO: 0.47 THOUSAND/ÂΜL (ref 0.17–1.22)
MONOCYTES NFR BLD AUTO: 5 % (ref 4–12)
NEUTROPHILS # BLD AUTO: 6.09 THOUSANDS/ÂΜL (ref 1.85–7.62)
NEUTS SEG NFR BLD AUTO: 68 % (ref 43–75)
NRBC BLD AUTO-RTO: 0 /100 WBCS
PLATELET # BLD AUTO: 361 THOUSANDS/UL (ref 149–390)
PMV BLD AUTO: 9 FL (ref 8.9–12.7)
POTASSIUM SERPL-SCNC: 4.2 MMOL/L (ref 3.5–5.3)
PROT SERPL-MCNC: 7.4 G/DL (ref 6.4–8.4)
RBC # BLD AUTO: 5.02 MILLION/UL (ref 3.88–5.62)
SODIUM SERPL-SCNC: 140 MMOL/L (ref 135–147)
TRIGL SERPL-MCNC: 176 MG/DL
WBC # BLD AUTO: 8.95 THOUSAND/UL (ref 4.31–10.16)

## 2023-10-11 PROCEDURE — 80061 LIPID PANEL: CPT

## 2023-10-11 PROCEDURE — 36415 COLL VENOUS BLD VENIPUNCTURE: CPT

## 2023-10-11 PROCEDURE — 85025 COMPLETE CBC W/AUTO DIFF WBC: CPT

## 2023-10-11 PROCEDURE — 80053 COMPREHEN METABOLIC PANEL: CPT

## 2023-10-13 ENCOUNTER — OFFICE VISIT (OUTPATIENT)
Dept: FAMILY MEDICINE CLINIC | Facility: CLINIC | Age: 33
End: 2023-10-13
Payer: COMMERCIAL

## 2023-10-13 VITALS
OXYGEN SATURATION: 97 % | TEMPERATURE: 97.2 F | HEIGHT: 68 IN | HEART RATE: 102 BPM | BODY MASS INDEX: 33.98 KG/M2 | DIASTOLIC BLOOD PRESSURE: 82 MMHG | SYSTOLIC BLOOD PRESSURE: 130 MMHG | WEIGHT: 224.2 LBS

## 2023-10-13 DIAGNOSIS — E78.5 HYPERLIPIDEMIA, UNSPECIFIED HYPERLIPIDEMIA TYPE: ICD-10-CM

## 2023-10-13 DIAGNOSIS — E66.9 OBESITY (BMI 30-39.9): ICD-10-CM

## 2023-10-13 DIAGNOSIS — Z23 ENCOUNTER FOR IMMUNIZATION: ICD-10-CM

## 2023-10-13 DIAGNOSIS — K42.9 PERIUMBILICAL HERNIA: ICD-10-CM

## 2023-10-13 DIAGNOSIS — Z00.00 HEALTH CARE MAINTENANCE: Primary | ICD-10-CM

## 2023-10-13 DIAGNOSIS — M51.36 DEGENERATIVE DISC DISEASE, LUMBAR: ICD-10-CM

## 2023-10-13 DIAGNOSIS — G56.03 BILATERAL CARPAL TUNNEL SYNDROME: ICD-10-CM

## 2023-10-13 DIAGNOSIS — D68.52 PROTHROMBIN GENE MUTATION (HCC): ICD-10-CM

## 2023-10-13 DIAGNOSIS — G56.23 ULNAR NEUROPATHY OF BOTH UPPER EXTREMITIES: ICD-10-CM

## 2023-10-13 DIAGNOSIS — Z82.49 FAMILY HISTORY OF PREMATURE CAD: ICD-10-CM

## 2023-10-13 PROBLEM — M51.369 DEGENERATIVE DISC DISEASE, LUMBAR: Status: ACTIVE | Noted: 2023-10-13

## 2023-10-13 PROCEDURE — 90471 IMMUNIZATION ADMIN: CPT

## 2023-10-13 PROCEDURE — 90686 IIV4 VACC NO PRSV 0.5 ML IM: CPT

## 2023-10-13 PROCEDURE — 99395 PREV VISIT EST AGE 18-39: CPT | Performed by: FAMILY MEDICINE

## 2023-10-13 NOTE — PATIENT INSTRUCTIONS
Here for general PE and will rec low cholesterol diet trial and diet sheet given, rec red yeast rice to try to see if this helps lower ldl and rec weight loss to get BMI lower than 25. Has hx of lumbar degenerative disc disease and also small periumbilical hernia. Discussed weight loss and exercise and will call if any problems. Monitor for CTS b/l wrists and ulnar neuropathy b/l arms. Call if worse and consider orthopedic consult and Hand center.

## 2023-10-13 NOTE — PROGRESS NOTES
Chief Complaint   Patient presents with    Physical Exam     Physical , lower back pain on the right side      Patient Instructions   Here for general PE and will rec low cholesterol diet trial and diet sheet given, rec red yeast rice to try to see if this helps lower ldl and rec weight loss to get BMI lower than 25. Has hx of lumbar degenerative disc disease and also small periumbilical hernia. Discussed weight loss and exercise and will call if any problems. Monitor for CTS b/l wrists and ulnar neuropathy b/l arms. Call if worse and consider orthopedic consult and Hand center. Assessment/Plan:    No problem-specific Assessment & Plan notes found for this encounter. Diagnoses and all orders for this visit:    Health care maintenance    Hyperlipidemia, unspecified hyperlipidemia type  -     Comprehensive metabolic panel; Future  -     Lipid Panel with Direct LDL reflex; Future    Obesity (BMI 30-39. 9)    Periumbilical hernia    Degenerative disc disease, lumbar    Ulnar neuropathy of both upper extremities    Bilateral carpal tunnel syndrome    Encounter for immunization  -     influenza vaccine, quadrivalent, 0.5 mL, preservative-free, for adult and pediatric patients 6 mos+ (AFLURIA, FLUARIX, FLULAVAL, FLUZONE)    Prothrombin gene mutation ([address])    Family history of premature CAD          Subjective:      Patient ID: Bart Babb is a 28 y.o. male. Physical Exam (Physical , lower back pain on the right side ), has 2 siblings and has family hx of father with MI at age 54 and patient with elevated ldl and trigs. Patient prefers low cholesterol diet trial for 6 months. Non smoker and drinks 2-3 beers on weekends. Engaged and has no children and patient is an . Hx of epididymitis. Has had more b/l wrist pain and numbness and tingling. Possible ulnar neuropathy. B/L hands.          The following portions of the patient's history were reviewed and updated as appropriate: allergies, current medications, past family history, past medical history, past social history, past surgical history, and problem list.    Review of Systems   Constitutional: Negative. HENT: Negative. Eyes: Negative. Respiratory: Negative. Cardiovascular: Negative. Gastrointestinal: Negative. Endocrine: Negative. Genitourinary: Negative. Musculoskeletal: Negative. Skin: Negative. Allergic/Immunologic: Negative. Neurological: Negative. Hematological: Negative. Psychiatric/Behavioral: Negative. Objective:      /82   Pulse 102   Temp (!) 97.2 °F (36.2 °C) (Temporal)   Ht 5' 8" (1.727 m)   Wt 102 kg (224 lb 3.2 oz)   SpO2 97%   BMI 34.09 kg/m²          Physical Exam  Constitutional:       Appearance: He is well-developed. He is obese. HENT:      Head: Normocephalic and atraumatic. Right Ear: Tympanic membrane, ear canal and external ear normal.      Left Ear: Tympanic membrane, ear canal and external ear normal.      Nose: Nose normal.      Mouth/Throat:      Mouth: Mucous membranes are moist.   Eyes:      Conjunctiva/sclera: Conjunctivae normal.      Pupils: Pupils are equal, round, and reactive to light. Cardiovascular:      Rate and Rhythm: Normal rate and regular rhythm. Pulses: Normal pulses. Heart sounds: Normal heart sounds. Pulmonary:      Effort: Pulmonary effort is normal.      Breath sounds: Normal breath sounds. Abdominal:      General: Abdomen is flat. Bowel sounds are normal.      Palpations: Abdomen is soft. Genitourinary:     Penis: Normal.       Testes: Normal.   Musculoskeletal:         General: Normal range of motion. Cervical back: Normal range of motion and neck supple. Skin:     General: Skin is warm and dry. Capillary Refill: Capillary refill takes less than 2 seconds. Neurological:      General: No focal deficit present. Mental Status: He is alert and oriented to person, place, and time.  Mental status is at baseline. Deep Tendon Reflexes: Reflexes are normal and symmetric. Psychiatric:         Mood and Affect: Mood normal.         Behavior: Behavior normal.         Thought Content:  Thought content normal.         Judgment: Judgment normal. epigastric

## 2023-10-13 NOTE — PROGRESS NOTES
Depression Screening and Follow-up Plan: Patient was screened for depression during today's encounter. They screened negative with a PHQ-2 score of 0.

## 2023-12-01 ENCOUNTER — OFFICE VISIT (OUTPATIENT)
Dept: FAMILY MEDICINE CLINIC | Facility: CLINIC | Age: 33
End: 2023-12-01
Payer: COMMERCIAL

## 2023-12-01 VITALS
HEIGHT: 68 IN | WEIGHT: 217.8 LBS | SYSTOLIC BLOOD PRESSURE: 122 MMHG | HEART RATE: 91 BPM | RESPIRATION RATE: 16 BRPM | DIASTOLIC BLOOD PRESSURE: 84 MMHG | OXYGEN SATURATION: 98 % | TEMPERATURE: 96.7 F | BODY MASS INDEX: 33.01 KG/M2

## 2023-12-01 DIAGNOSIS — M25.531 PAIN IN BOTH WRISTS: ICD-10-CM

## 2023-12-01 DIAGNOSIS — M54.50 LOW BACK PAIN, UNSPECIFIED BACK PAIN LATERALITY, UNSPECIFIED CHRONICITY, UNSPECIFIED WHETHER SCIATICA PRESENT: Primary | ICD-10-CM

## 2023-12-01 DIAGNOSIS — N45.1 EPIDIDYMITIS: ICD-10-CM

## 2023-12-01 DIAGNOSIS — M25.561 PAIN IN BOTH KNEES, UNSPECIFIED CHRONICITY: ICD-10-CM

## 2023-12-01 DIAGNOSIS — M25.532 PAIN IN BOTH WRISTS: ICD-10-CM

## 2023-12-01 DIAGNOSIS — M25.562 PAIN IN BOTH KNEES, UNSPECIFIED CHRONICITY: ICD-10-CM

## 2023-12-01 DIAGNOSIS — E66.9 OBESITY (BMI 30-39.9): ICD-10-CM

## 2023-12-01 PROCEDURE — 99214 OFFICE O/P EST MOD 30 MIN: CPT | Performed by: FAMILY MEDICINE

## 2023-12-01 RX ORDER — MELOXICAM 7.5 MG/1
7.5 TABLET ORAL DAILY PRN
Qty: 30 TABLET | Refills: 0 | Status: SHIPPED | OUTPATIENT
Start: 2023-12-01
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
## 2023-12-04 ENCOUNTER — APPOINTMENT (OUTPATIENT)
Dept: RADIOLOGY | Facility: MEDICAL CENTER | Age: 33
End: 2023-12-04
Payer: COMMERCIAL

## 2023-12-04 ENCOUNTER — TELEPHONE (OUTPATIENT)
Dept: FAMILY MEDICINE CLINIC | Facility: CLINIC | Age: 33
End: 2023-12-04

## 2023-12-04 DIAGNOSIS — M54.50 LOW BACK PAIN, UNSPECIFIED BACK PAIN LATERALITY, UNSPECIFIED CHRONICITY, UNSPECIFIED WHETHER SCIATICA PRESENT: ICD-10-CM

## 2023-12-04 DIAGNOSIS — M25.561 PAIN IN BOTH KNEES, UNSPECIFIED CHRONICITY: ICD-10-CM

## 2023-12-04 DIAGNOSIS — M25.579 FOOT AND ANKLE PAIN: Primary | ICD-10-CM

## 2023-12-04 DIAGNOSIS — M25.562 PAIN IN BOTH KNEES, UNSPECIFIED CHRONICITY: ICD-10-CM

## 2023-12-04 DIAGNOSIS — M25.579 FOOT AND ANKLE PAIN: ICD-10-CM

## 2023-12-04 DIAGNOSIS — M79.673 FOOT AND ANKLE PAIN: Primary | ICD-10-CM

## 2023-12-04 DIAGNOSIS — M79.673 FOOT AND ANKLE PAIN: ICD-10-CM

## 2023-12-04 PROCEDURE — 73562 X-RAY EXAM OF KNEE 3: CPT

## 2023-12-04 PROCEDURE — 73630 X-RAY EXAM OF FOOT: CPT

## 2023-12-04 PROCEDURE — 72110 X-RAY EXAM L-2 SPINE 4/>VWS: CPT

## 2023-12-04 PROCEDURE — 73610 X-RAY EXAM OF ANKLE: CPT

## 2023-12-06 ENCOUNTER — OFFICE VISIT (OUTPATIENT)
Dept: OBGYN CLINIC | Facility: CLINIC | Age: 33
End: 2023-12-06
Payer: COMMERCIAL

## 2023-12-06 VITALS
SYSTOLIC BLOOD PRESSURE: 128 MMHG | DIASTOLIC BLOOD PRESSURE: 80 MMHG | BODY MASS INDEX: 33.04 KG/M2 | WEIGHT: 218 LBS | HEIGHT: 68 IN

## 2023-12-06 DIAGNOSIS — M17.2 POST-TRAUMATIC OSTEOARTHRITIS OF BOTH KNEES: Primary | ICD-10-CM

## 2023-12-06 PROCEDURE — 99203 OFFICE O/P NEW LOW 30 MIN: CPT | Performed by: PHYSICIAN ASSISTANT

## 2023-12-06 RX ORDER — MELOXICAM 15 MG/1
15 TABLET ORAL DAILY
Qty: 30 TABLET | Refills: 0 | Status: SHIPPED | OUTPATIENT
Start: 2023-12-06

## 2023-12-06 NOTE — PROGRESS NOTES
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 Continue meloxicam.  Refill provided. Referral to physical therapy. Physical therapy referral also includes order for overall strengthening and conditioning of the lower extremities and also evaluation of his lumbar spine which he reports he has some pain as well. Activities as tolerated with modification to avoid pain. Follow-up in 6 weeks. Discussed MRI if symptoms persist.    Chief Complaint     Bilateral knee pain    History of the Present Illness     Mateus Christianson is a 28 y.o. male who reports to the office today for evaluation of his bilateral knees. Patient notes worsening pain in the bilateral knees that began approximately 1 to 2 months ago. He denies any recent injury or trauma. Patient works as an  for [employer] but recently had to be more involved on the [employer] which he believes led to his increase in pain. He does have a history of bilateral knee ACL reconstruction. His right knee ACL reconstruction was performed in 2005 at [facility] utilizing an allograft. He also reports ACL reconstruction in the left knee utilizing hamstring autograft in 2011. He states he read tore his ACL graft on the left and underwent revision ACL reconstruction with allograft in the left knee in 2016 again at [facility]. He notes generalized bilateral knee pain with his right knee bothering him more than the left. He denies any swelling. He does note some stiffness first thing in the morning. Pain is worse with increased activity. He does note some subjective weakness but denies any instability. He denies any catching or locking. No numbness or tingling. No fevers or chills. He was recently prescribed meloxicam which she feels has helped somewhat.     Physical Exam     /80   Ht 5' 8" (1.727 m)   Wt 98.9 kg (218 lb)   BMI 33.15 kg/m²     Bilateral knees: No gross deformity. Skin intact. No erythema ecchymosis or swelling. No effusion. Scars appreciated from prior ACL reconstructions. No significant joint line tenderness. Range of motion 0-120 without pain or crepitation. Stable to varus and valgus stress bilaterally without pain. Stable Lachman test with firm endpoint bilaterally. Negative posterior drawer test.  Negative Bijal's test.  Negative patellar grind test.  Extensor mechanism intact bilaterally. Eyes: Anicteric sclerae. ENT: Trachea midline. Lungs: Normal respiratory effort. CV: Capillary refill is less than 2 seconds. Skin: Intact without erythema. Lymph: No palpable lymphadenopathy. Neuro: Sensation is grossly intact to light touch. Psych: Mood and affect are appropriate. Data Review     I have personally reviewed pertinent films in PACS, and my interpretation follows:    X-rays bilateral knees 12/4/2023: No acute osseous abnormality. No fracture or dislocation. Evidence of prior ACL surgery appreciated without signs of complication. Mild tricompartmental degenerative changes.     Past Medical History:   Diagnosis Date    Migraine        Past Surgical History:   Procedure Laterality Date    KNEE ARTHROSCOPY W/ ACL RECONSTRUCTION      has had it three times between two knees       Allergies   Allergen Reactions    Sulfa Antibiotics        Current Outpatient Medications on File Prior to Visit   Medication Sig Dispense Refill    meloxicam (MOBIC) 7.5 mg tablet Take 1 tablet (7.5 mg total) by mouth daily as needed for moderate pain 30 tablet 0    Flowflex COVID-19 Ag Home Test KIT  (Patient not taking: Reported on 12/1/2023)      naproxen (Naprosyn) 500 mg tablet Take 1 tablet (500 mg total) by mouth 2 (two) times a day with meals (Patient not taking: Reported on 10/13/2023) 30 tablet 0    naproxen (NAPROSYN) 500 mg tablet Take 1 tablet (500 mg total) by mouth 2 (two) times a day with meals for 10 days 20 tablet 0     No current facility-administered medications on file prior to visit. Social History     Tobacco Use    Smoking status: Never    Smokeless tobacco: Never   Vaping Use    Vaping Use: Never used   Substance Use Topics    Alcohol use: Yes     Comment: socially     Drug use: Never       Family History   Problem Relation Age of Onset    No Known Problems Mother     Heart disease Father     Hypertension Father     Myasthenia gravis Father     Asthma Sister     Asthma Brother     Hypertension Maternal Grandmother     Seizures Maternal Grandfather     Glaucoma Maternal Grandfather     Hypertension Maternal Grandfather     Leukemia Maternal Grandfather     Migraines Paternal Grandmother     Breast cancer Paternal Grandmother     Hypertension Paternal Grandfather        Review of Systems     As stated in the HPI. All other systems reviewed and are negative.

## 2023-12-08 ENCOUNTER — OFFICE VISIT (OUTPATIENT)
Dept: OBGYN CLINIC | Facility: CLINIC | Age: 33
End: 2023-12-08

## 2023-12-08 VITALS
SYSTOLIC BLOOD PRESSURE: 124 MMHG | DIASTOLIC BLOOD PRESSURE: 82 MMHG | BODY MASS INDEX: 33.04 KG/M2 | HEIGHT: 68 IN | WEIGHT: 218 LBS

## 2023-12-08 DIAGNOSIS — M77.12 LATERAL EPICONDYLITIS OF BOTH ELBOWS: Primary | ICD-10-CM

## 2023-12-08 DIAGNOSIS — M77.11 LATERAL EPICONDYLITIS OF BOTH ELBOWS: Primary | ICD-10-CM

## 2023-12-08 DIAGNOSIS — M77.8 TENDINITIS OF BOTH WRISTS: ICD-10-CM

## 2023-12-08 NOTE — PROGRESS NOTES
Patient Name:  Mateus Christianson  MRN:  [id number]    Assessment & Plan     Bilateral wrist pain, likely FCR tendinitis. Bilateral lateral epicondylitis. Referral to physical therapy. Continue meloxicam as prescribed recently for his knee as this should also provide improvement for his upper extremities. Continue universal wrist braces for comfort. Recommend over-the-counter tennis elbow straps to be worn when active. Activities as tolerated with modification to avoid pain. Follow-up in 6 weeks. Chief Complaint     Lateral wrist pain    History of the Present Illness     Mateus Christianson is a 28 y.o. right-hand-dominant male who reports to the office today for evaluation of his bilateral wrist.  He initially noted pain primarily in the right wrist that began a few months ago. He denies any injury or trauma. He noted primarily generalized pain in the right wrist worse with repetitive use and lifting. He denied any swelling or stiffness. He noted weakness due to the pain. No instability. He did note intermittent numbness and tingling primarily in the ring and small fingers which has since subsided significantly. He also noted some pain in the lateral aspect of the elbow with radiation into the forearm. He states recently his pain has improved somewhat in the right wrist.  He now notes pain in the left wrist as well. Pain is also localized to the volar aspect of the wrist and worse with repetitive use and increased activity. He notes weakness due to the pain. No instability. He denies any significant numbness and tingling at this time. He also notes some pain in the lateral elbow as well. He currently uses wrist braces at night which with some improvement. He was recently prescribed meloxicam for a knee issue and has not noticed any improvement in his wrists while initiating the meloxicam.  No fevers or chills.     Physical Exam     /82   Ht 5' 8" (1.727 m)   Wt 98.9 kg (218 lb)   BMI 33.15 kg/m²     Right wrist: Skin intact. No gross deformity. No erythema ecchymosis or swelling. No thenar atrophy. No first dorsal interosseous space atrophy. No significant tenderness over the distal radius, distal ulna, scaphoid, remainder of the carpus, digits, and first dorsal compartment and CMC joint of the thumb. .  There is some tenderness over the FCR. There is also tenderness over the lateral epicondyle of the elbow. Full wrist flexion extension without pain. Full composite fist formation. Pain in the lateral elbow with resisted wrist extension. No pain in the elbow with resisted wrist flexion. Negative Tinel's over the cubital tunnel. Negative Tinel's and Durkan's compression test about the carpal tunnel. Sensation intact median ulnar and radial nerves. 2+ radial pulse. Negative Finkelstein test.  Negative CMC grind test.    Left wrist: Skin is intact without erythema ecchymosis or swelling. No gross deformity. No thenar atrophy or first dorsal interosseous space atrophy. No tenderness distal radius, distal ulna, scaphoid, remainder of the carpus, digits, first dorsal compartment, and CMC joint of the thumb. There is some tenderness over the FCR. There is also tenderness over the lateral epicondyle of the elbow. Full wrist flexion extension without pain. Full composite fist remission without pain as well. Pain in the lateral elbow with resisted wrist extension. No pain in the elbow with resisted wrist flexion. Negative Tinel's over the cubital and carpal tunnel. Negative Durkan's compression test about the carpal tunnel. Sensation intact median ulnar and radial nerves. 2+ radial pulse. Negative Finkelstein test.  Negative CMC grind test.    Eyes: Anicteric sclerae. ENT: Trachea midline. Lungs: Normal respiratory effort. CV: Capillary refill is less than 2 seconds. Skin: Intact without erythema. Lymph: No palpable lymphadenopathy.   Neuro: Sensation is grossly intact to light touch. Psych: Mood and affect are appropriate. Data Review     I have personally reviewed pertinent films in PACS, and my interpretation follows:    X-rays bilateral wrists 12/8/2023: No acute osseous abnormalities. No fracture or dislocation. No significant degenerative changes. Past Medical History:   Diagnosis Date    Migraine        Past Surgical History:   Procedure Laterality Date    KNEE ARTHROSCOPY W/ ACL RECONSTRUCTION      has had it three times between two knees       Allergies   Allergen Reactions    Sulfa Antibiotics        Current Outpatient Medications on File Prior to Visit   Medication Sig Dispense Refill    meloxicam (Mobic) 15 mg tablet Take 1 tablet (15 mg total) by mouth daily 30 tablet 0    meloxicam (MOBIC) 7.5 mg tablet Take 1 tablet (7.5 mg total) by mouth daily as needed for moderate pain 30 tablet 0    Flowflex COVID-19 Ag Home Test KIT  (Patient not taking: Reported on 12/1/2023)      naproxen (Naprosyn) 500 mg tablet Take 1 tablet (500 mg total) by mouth 2 (two) times a day with meals (Patient not taking: Reported on 10/13/2023) 30 tablet 0    naproxen (NAPROSYN) 500 mg tablet Take 1 tablet (500 mg total) by mouth 2 (two) times a day with meals for 10 days 20 tablet 0     No current facility-administered medications on file prior to visit.        Social History     Tobacco Use    Smoking status: Never    Smokeless tobacco: Never   Vaping Use    Vaping Use: Never used   Substance Use Topics    Alcohol use: Yes     Comment: socially     Drug use: Never       Family History   Problem Relation Age of Onset    No Known Problems Mother     Heart disease Father     Hypertension Father     Myasthenia gravis Father     Asthma Sister     Asthma Brother     Hypertension Maternal Grandmother     Seizures Maternal Grandfather     Glaucoma Maternal Grandfather     Hypertension Maternal Grandfather     Leukemia Maternal Grandfather     Migraines Paternal Grandmother Breast cancer Paternal Grandmother     Hypertension Paternal Grandfather        Review of Systems     As stated in the HPI. All other systems reviewed and are negative.

## 2023-12-21 ENCOUNTER — EVALUATION (OUTPATIENT)
Dept: PHYSICAL THERAPY | Facility: MEDICAL CENTER | Age: 33
End: 2023-12-21
Payer: COMMERCIAL

## 2023-12-21 DIAGNOSIS — M77.11 LATERAL EPICONDYLITIS OF BOTH ELBOWS: Primary | ICD-10-CM

## 2023-12-21 DIAGNOSIS — M77.8 TENDINITIS OF BOTH WRISTS: ICD-10-CM

## 2023-12-21 DIAGNOSIS — M77.12 LATERAL EPICONDYLITIS OF BOTH ELBOWS: Primary | ICD-10-CM

## 2023-12-21 PROCEDURE — 97110 THERAPEUTIC EXERCISES: CPT | Performed by: PHYSICAL THERAPIST

## 2023-12-21 PROCEDURE — 97162 PT EVAL MOD COMPLEX 30 MIN: CPT | Performed by: PHYSICAL THERAPIST

## 2023-12-21 NOTE — PROGRESS NOTES
PT Evaluation     Today's date: 2023  Patient name: Donald Collazo  : 1990  MRN: [id number]  Referring provider: Roberth Nowak P*  Dx:   Encounter Diagnosis     ICD-10-CM    1. Lateral epicondylitis of both elbows  M77.11 Ambulatory Referral to Physical Therapy    M77.12       2. Tendinitis of both wrists  M77.8 Ambulatory Referral to Physical Therapy                     Assessment  Assessment details: Mr. Collazo is 33 y.o. RHD male who presents today for physical therapy evaluation for chronic bilateral elbow and wrist pain. No further referral appears necessary at this time based upon examination findings. Patient presents with primary movement impairment of postural dysfunction with decreased motor control and strength of the postural, scapular, and shoulder stabilizers resulting in repetitive overuse syndrome of the wrist flexors and wrist/digit extensors limiting patients tolerance to twisting, pushing, lifting, and reaching activities. Patient would benefit from outpatient physical therapy services to address the observed impairments to manage symptoms and improve tolerance to occupational demands. Extensive education spent on activity modifications with cell phone and computer work. Prognosis is good given compliance with PT attendance and HEP performance. Please contact me with any questions. Thank you for the referral.   Impairments: abnormal or restricted ROM, activity intolerance, impaired physical strength, lacks appropriate home exercise program, pain with function, poor posture  and poor body mechanics  Barriers to therapy: Occupational demands  Understanding of Dx/Px/POC: good   Prognosis: good  Prognosis details: Motivated to improve    Goals  1. Patient will be independent in individualized HEP  2. Patient will implement activation modifications as discussed.  3. Patient will improve thoracic extension thoracic mobility to at least minimal restriction.  4. Patient  will improve strength of the scapular stabilizers by 1 grade.  5. Patient will improve RTC strength by 1 grade.  6. Patient will report decreased pain by 75%.  7. Patient will be able to perform daily activities and occupational demands without limitation due to pain.  8. Patient will achieve score on FOTO by [facility].     Plan  Patient would benefit from: skilled physical therapy  Planned therapy interventions: joint mobilization, manual therapy, neuromuscular re-education, patient education, postural training, strengthening, stretching, therapeutic activities, therapeutic exercise, home exercise program, graded activity, graded exercise, functional ROM exercises, activity modification and body mechanics training  Frequency: 1x week  Duration in weeks: 8  Plan of Care beginning date: 12/21/2023  Plan of Care expiration date: 2/16/2024  Treatment plan discussed with: patient        Subjective Evaluation    History of Present Illness  Mechanism of injury: Mr. Collazo is a 33 y.o. male who presents today with reports of bilateral elbow and wrist pain. Patient reports onset of symptoms roughly 3 months ago. He states the symptoms started to resolve on his own but then over the last month the symptoms started to return at an increased intensity. Patient denies any history of injury. Patient is an  at [employer] at around the time of his symptoms he was doing more physical work vs the computer work he is used to. He did self prescribe wrist cock up braces that he is wearing at night time with only occasional relief. Patient denies any numbness/tingling. He was prescribed NSAIDs with no significant relief. He has tried stretching with only slight relief at times. Patient reports concerns for not knowing what is wrong and limiting his ability to perform his occupational tasks. Patient's goals are to return to his typical activities without focusing on his symptoms.   Patient Goals  Patient goals for therapy: decreased  pain, increased motion and increased strength  Patient's goals regarding treatment: perform occupational demands.  Pain  Current pain ratin  At best pain ratin  At worst pain ratin  Quality: sharp and throbbing  Exacerbated by: pushing, opening containers, washing disehs, carrying grocery bags, reachng behind the back.  Progression: worsening    Social Support    Employment status: working ( at [employer])  Hand dominance: right      Diagnostic Tests  X-ray: normal (bilateral wrists)  Treatments  Treatments tried: self prescribed wrist braces.        Objective     Postural Observations  Seated posture: fair    Additional Postural Observation Details  Forward head, bilateral rounded and protracted shoulders, increased thoracic kypohosis    Palpation   Left   Tenderness of the wrist extensors and wrist flexors.     Right   Tenderness of the wrist extensors and wrist flexors.     Tenderness     Left Elbow   Tenderness in the lateral epicondyle and medial epicondyle.     Right Elbow   Tenderness in the lateral epicondyle and medial epicondyle.     Left Wrist/Hand   Tenderness in the lateral epicondyle and medial epicondyle.     Right Wrist/Hand   Tenderness in the lateral epicondyle and medial epicondyle.     Active Range of Motion   Cervical/Thoracic Spine       Cervical    Flexion:  WFL  Extension:  WFL  Left lateral flexion:  WFL  Right lateral flexion:  WFL  Left rotation: Neck active rotation left: lacking upper cervical rotation.  Right rotation: Neck active rotation right: lacking upper cervical rotation.       Thoracic    Flexion:  WFL  Extension:  with pain Restriction level: moderate  Left Shoulder   Flexion: WFL  External rotation 0°: WFL  External rotation BTH: T1 with pain  Internal rotation BTB: lumbar with pain    Right Shoulder   Flexion: WFL  External rotation 0°: WFL  External rotation BTH: T1 with pain  Internal rotation BTB: lumbar with pain    Left Wrist   Wrist flexion: 45  degrees with pain  Wrist extension: 60 degrees with pain    Right Wrist   Wrist flexion: 60 degrees   Wrist extension: 60 degrees     Passive Range of Motion   Left Shoulder   Flexion: WFL  Abduction: WFL  External rotation 90°: WFL  Internal rotation 90°: 60 degrees with pain    Right Shoulder   Flexion: WFL  Abduction: WFL  External rotation 90°: WFL  Internal rotation 90°: 50 degrees with pain    Left Wrist   Normal passive range of motion    Right Wrist   Normal passive range of motion    Additional Passive Range of Motion Details  Isolated passive stretch test:  ECRB/L WNL, pain on left  ECU WNL bilaterally  FCR WNL bilaterally  FCU WNL bilaterally    Joint Play     Left Elbow   Hypomobile in the humeroradial joint.    Right Elbow   Joints within functional limits are the humeroradial joint.     Comments: Hypomobile throughout upper and mid thoracic spine  Segmental mobility cervical spine WNL  Passive cervical rotation WNL bilaterally  Hypertonicity cervical and thoracic paraspinals    Strength/Myotome Testing     Left Shoulder     Planes of Motion   Flexion: 4-   Abduction: 4-   External rotation at 0°: 4     Isolated Muscles   Biceps: 5   Infraspinatus: 4   Lower trapezius: 4-   Middle trapezius: 4-   Rhomboids: 4-   Supraspinatus: 4-   Triceps: 5     Right Shoulder     Planes of Motion   Flexion: 4-   Abduction: 4-   External rotation at 0°: 4     Isolated Muscles   Biceps: 5   Infraspinatus: 4   Lower trapezius: 4-   Middle trapezius: 4-   Rhomboids: 4-   Supraspinatus: 4-   Triceps: 5     Left Elbow   Normal strength    Right Elbow   Normal strength    Left Wrist/Hand   Normal wrist strength    Right Wrist/Hand   Normal wrist strength    Additional Strength Details  Isolated resisted testing:  ECRB/L 5/5, pain bilaterally  ECU 5/5 bilaterally  FCR 5/5 bilaterally  FCU 5/5 bilaterally    APL 5/5, pain L > R  EPL 5/5 bilaterally    Tests     Left Shoulder   Negative ULTT1 and ULTT3.     Right Shoulder    Negative ULTT1 and ULTT3.     Left Elbow   Positive Cozen's and Mill's.   Negative radial tunnel.     Right Elbow   Positive Cozen's.   Negative Mill's and radial tunnel.     Left Wrist/Hand   Positive Finkelstein's and wrist hyperflexion and abduction test positive.     Right Wrist/Hand   Negative Finkelstein's and wrist hyperflexion and abduction test positive.     Additional Tests Details  (+) resisted ECU (digits 3-4 bilaterally)      Flowsheet Rows      Flowsheet Row Most Recent Value   PT/OT G-Codes    Current Score 50   Projected Score 68   FOTO information reviewed Yes   Assessment Type Evaluation               Precautions: n/a    HEP: thoracic extension stretch, scap retractions, extensor compartment stretch  Manuals 12/21            L RH volar joint mobs nv            Upper/mid t/s joint mobs nv            Posterior capsule stretch nv            TFM EDC/ ECRB/L (B) nv            Neuro Re-Ed             Scap-4 nv            Prone raises nv                                                                             Ther Ex             T/s extension stretch with foam roller nv            Sidelying ER nv            Foam roller stretch at wall nv            SA walks at wall nv                                                                Ther Activity                                       Gait Training                                       Modalities

## 2023-12-23 DIAGNOSIS — M54.50 LOW BACK PAIN, UNSPECIFIED BACK PAIN LATERALITY, UNSPECIFIED CHRONICITY, UNSPECIFIED WHETHER SCIATICA PRESENT: ICD-10-CM

## 2023-12-23 DIAGNOSIS — M25.531 PAIN IN BOTH WRISTS: ICD-10-CM

## 2023-12-23 DIAGNOSIS — M25.532 PAIN IN BOTH WRISTS: ICD-10-CM

## 2023-12-23 DIAGNOSIS — M25.561 PAIN IN BOTH KNEES, UNSPECIFIED CHRONICITY: ICD-10-CM

## 2023-12-23 DIAGNOSIS — M25.562 PAIN IN BOTH KNEES, UNSPECIFIED CHRONICITY: ICD-10-CM

## 2023-12-26 RX ORDER — MELOXICAM 7.5 MG/1
7.5 TABLET ORAL DAILY PRN
Qty: 90 TABLET | Refills: 1 | Status: SHIPPED | OUTPATIENT
Start: 2023-12-26

## 2023-12-28 ENCOUNTER — OFFICE VISIT (OUTPATIENT)
Dept: PHYSICAL THERAPY | Facility: MEDICAL CENTER | Age: 33
End: 2023-12-28
Payer: COMMERCIAL

## 2023-12-28 DIAGNOSIS — M77.8 TENDINITIS OF BOTH WRISTS: ICD-10-CM

## 2023-12-28 DIAGNOSIS — M77.12 LATERAL EPICONDYLITIS OF BOTH ELBOWS: Primary | ICD-10-CM

## 2023-12-28 DIAGNOSIS — M77.11 LATERAL EPICONDYLITIS OF BOTH ELBOWS: Primary | ICD-10-CM

## 2023-12-28 PROCEDURE — 97110 THERAPEUTIC EXERCISES: CPT | Performed by: PHYSICAL THERAPIST

## 2023-12-28 PROCEDURE — 97112 NEUROMUSCULAR REEDUCATION: CPT | Performed by: PHYSICAL THERAPIST

## 2023-12-28 PROCEDURE — 97140 MANUAL THERAPY 1/> REGIONS: CPT | Performed by: PHYSICAL THERAPIST

## 2023-12-28 NOTE — PROGRESS NOTES
"Daily Note     Today's date: 2023  Patient name: Donald Collazo  : 1990  MRN: [id number]  Referring provider: Roberth Nowak P*  Dx:   Encounter Diagnosis     ICD-10-CM    1. Lateral epicondylitis of both elbows  M77.11     M77.12       2. Tendinitis of both wrists  M77.8                      Subjective: Patient states both of his shoulders have been bothering him more. Decreased elbow pain on the left, no pain reported at right elbow. Left volar wrist pain persists.      Objective: See treatment diary below      Assessment: Today was patients first treatment session back since initial evaluation. Patient responded well to manual interventions. Followed up with postural mobility and strengthening. Patient did reach muscular fatigue as expected. Cueing provided for execution with scap 4 to minimize hyperlordosis of the lumbar spine and further engage his core. KT was applied for the L wrist- will monitor response to see if he would benefit from a more custom type brace to include his thumb.       Plan: Continue per plan of care.  Progress treatment as tolerated.  Custom orthosis as needed.     Precautions: n/a    HEP: thoracic extension stretch, scap retractions, extensor compartment stretch  Manuals            L RH volar joint mobs nv Gr. III-IV           Upper/mid t/s joint mobs nv Gr. III-IV           Posterior capsule stretch (B) nv CK           TFM EDC/ ECRB/L (L) nv CK           Neuro Re-Ed             Scap-4 nv No band x10 ea           Prone raises nv X10 ea                                                                            Ther Ex             T/s extension stretch with foam roller nv 5\" X10-15           Supine SA punches  3\"x20           Sidelying ER (B) nv x20           Foam roller stretch at wall nv 10\" x10           SA walks at wall                                                                 Ther Activity                                       Gait " Training                                       Modalities

## 2024-01-05 ENCOUNTER — OFFICE VISIT (OUTPATIENT)
Dept: PHYSICAL THERAPY | Facility: MEDICAL CENTER | Age: 34
End: 2024-01-05
Payer: COMMERCIAL

## 2024-01-05 DIAGNOSIS — M77.12 LATERAL EPICONDYLITIS OF BOTH ELBOWS: Primary | ICD-10-CM

## 2024-01-05 DIAGNOSIS — M77.8 TENDINITIS OF BOTH WRISTS: ICD-10-CM

## 2024-01-05 DIAGNOSIS — M77.11 LATERAL EPICONDYLITIS OF BOTH ELBOWS: Primary | ICD-10-CM

## 2024-01-05 PROCEDURE — 97112 NEUROMUSCULAR REEDUCATION: CPT | Performed by: PHYSICAL THERAPIST

## 2024-01-05 PROCEDURE — 97140 MANUAL THERAPY 1/> REGIONS: CPT | Performed by: PHYSICAL THERAPIST

## 2024-01-05 PROCEDURE — 97110 THERAPEUTIC EXERCISES: CPT | Performed by: PHYSICAL THERAPIST

## 2024-01-05 NOTE — PROGRESS NOTES
"Daily Note     Today's date: 2024  Patient name: Donald Collazo  : 1990  MRN: [id number]  Referring provider: Roberth Nowak P*  Dx:   Encounter Diagnosis     ICD-10-CM    1. Lateral epicondylitis of both elbows  M77.11     M77.12       2. Tendinitis of both wrists  M77.8                      Subjective: Patient states both of his shoulders have been bothering him more. Decreased elbow pain on the left, no pain reported at right elbow. Left volar wrist pain persists. He reports he was sore in his shoulder muscles after last visit. Reports he continues to experience discomfort in both shoulders at night time.       Objective: See treatment diary below      Assessment:  patient responded well to KT- continued with taping for symptom relief. Reviewed TFM at supracondylar ridge and and along proximal extensors. Frequent cueing for all strengthening for the scapular stabilizers. Patient did not have any elbow pain with band exercises. Provided patient with updated HEP. Patient appropriately challenged and fatigued with current exercise prescription. Patient would benefit from continued PT for postural strengthening and graded exercise for symptom management and improve tolerance to activity.       Plan: Continue per plan of care.  Progress treatment as tolerated.  Custom orthosis as needed.     Precautions: n/a    HEP: thoracic extension stretch, scap retractions, extensor compartment stretch  Manuals /5          L RH volar joint mobs nv Gr. III-IV Gr. III-IV          Upper/mid t/s joint mobs nv Gr. III-IV Gr. III_IV          Posterior capsule stretch (B) nv CK CK          TFM EDC/ ECRB/L (L) nv CK CK          KT 1st dorsal compartment   CK          Neuro Re-Ed             Scap-4 nv No band x10 ea YTB x20 ea          Prone raises nv X10 ea 5\" x10 ea                                                                           Ther Ex             T/s extension stretch with foam roller " "nv 5\" X10-15 5\" x10-15          Supine SA punches  3\"x20 3\"x30          Sidelying ER (B) nv x20 X30 ea          Foam roller stretch at wall nv 10\" x10 10\" x10          SA walks at wall                                                                 Ther Activity                                       Gait Training                                       Modalities                                            " [No Acute Distress] : no acute distress [Supple] : supple [No Respiratory Distress] : no respiratory distress  [No Accessory Muscle Use] : no accessory muscle use [Clear to Auscultation] : lungs were clear to auscultation bilaterally [Normal Rate] : normal rate  [Regular Rhythm] : with a regular rhythm [Normal S1, S2] : normal S1 and S2 [Soft] : abdomen soft [Normal Gait] : normal gait [Alert and Oriented x3] : oriented to person, place, and time [de-identified] : trace edema

## 2024-01-11 ENCOUNTER — OFFICE VISIT (OUTPATIENT)
Dept: DERMATOLOGY | Facility: CLINIC | Age: 34
End: 2024-01-11
Payer: COMMERCIAL

## 2024-01-11 VITALS — BODY MASS INDEX: 32.58 KG/M2 | HEIGHT: 68 IN | WEIGHT: 215 LBS

## 2024-01-11 DIAGNOSIS — D22.5 MULTIPLE BENIGN MELANOCYTIC NEVI OF BOTH UPPER EXTREMITIES, BOTH LOWER EXTREMITIES, AND TRUNK: Primary | ICD-10-CM

## 2024-01-11 DIAGNOSIS — D22.71 MULTIPLE BENIGN MELANOCYTIC NEVI OF BOTH UPPER EXTREMITIES, BOTH LOWER EXTREMITIES, AND TRUNK: Primary | ICD-10-CM

## 2024-01-11 DIAGNOSIS — L21.9 SEBORRHEIC DERMATITIS: ICD-10-CM

## 2024-01-11 DIAGNOSIS — D22.5 BECKER'S NEVUS: ICD-10-CM

## 2024-01-11 DIAGNOSIS — D22.72 MULTIPLE BENIGN MELANOCYTIC NEVI OF BOTH UPPER EXTREMITIES, BOTH LOWER EXTREMITIES, AND TRUNK: Primary | ICD-10-CM

## 2024-01-11 DIAGNOSIS — D22.61 MULTIPLE BENIGN MELANOCYTIC NEVI OF BOTH UPPER EXTREMITIES, BOTH LOWER EXTREMITIES, AND TRUNK: Primary | ICD-10-CM

## 2024-01-11 DIAGNOSIS — D22.62 MULTIPLE BENIGN MELANOCYTIC NEVI OF BOTH UPPER EXTREMITIES, BOTH LOWER EXTREMITIES, AND TRUNK: Primary | ICD-10-CM

## 2024-01-11 DIAGNOSIS — Q82.5 CONGENITAL NEVUS: ICD-10-CM

## 2024-01-11 PROCEDURE — 99204 OFFICE O/P NEW MOD 45 MIN: CPT | Performed by: DERMATOLOGY

## 2024-01-11 RX ORDER — TRIAMCINOLONE ACETONIDE 0.25 MG/ML
LOTION TOPICAL 2 TIMES DAILY
Qty: 60 ML | Refills: 3 | Status: SHIPPED | OUTPATIENT
Start: 2024-01-11

## 2024-01-11 RX ORDER — DOXYCYCLINE HYCLATE 100 MG/1
CAPSULE ORAL
COMMUNITY
Start: 2024-01-03

## 2024-01-11 NOTE — PROGRESS NOTES
"[facility] Note     Patient Name: Donald Collazo  Encounter Date: 1/11/24     Have you been cared for by a [facility] Dermatologist in the last 3 years and, if so, which description applies to you?    NO.   I am considered a \"new\" patient and must complete all patient intake questions. I am MALE/not capable of bearing children.    REVIEW OF SYSTEMS:  Have you recently had or currently have any of the following? Recent fever or chills? No  Any non-healing wound? No   PAST MEDICAL HISTORY:  Have you personally ever had or currently have any of the following?  If \"YES,\" then please provide more detail. Skin cancer (such as Melanoma, Basal Cell Carcinoma, Squamous Cell Carcinoma?  No  Tuberculosis, HIV/AIDS, Hepatitis B or C: No  Radiation Treatment No   HISTORY OF IMMUNOSUPPRESSION:   Do you have a history of any of the following:  Systemic Immunosuppression such as Diabetes, Biologic or Immunotherapy, Chemotherapy, Organ Transplantation, Bone Marrow Transplantation?  No     Answering \"YES\" requires the addition of the dotphrase \"IMMUNOSUPPRESSED\" as the first diagnosis of the patient's visit.   FAMILY HISTORY:  Any \"first degree relatives\" (parent, brother, sister, or child) with the following?    Skin Cancer, Pancreatic or Other Cancer? No   PATIENT EXPERIENCE:    Do you want the Dermatologist to perform a COMPLETE skin exam today including a clinical examination under the \"bra and underwear\" areas?  Yes  If necessary, do we have your permission to call and leave a detailed message on your Preferred Phone number that includes your specific medical information?  Yes      Allergies   Allergen Reactions   • Sulfa Antibiotics       Current Outpatient Medications:   •  doxycycline hyclate (VIBRAMYCIN) 100 mg capsule, TAKE 1 CAPSULE BY MOUTH TWICE A DAY FOR 10 DAYS, Disp: , Rfl:   •  meloxicam (Mobic) 15 mg tablet, Take 1 tablet (15 mg total) by mouth daily, Disp: 30 tablet, Rfl: 0  •  " Triamcinolone Acetonide 0.025 % LOTN, Apply topically 2 (two) times a day Apply sparingly twice a day. Do not use more then 10 times a month, Disp: 60 mL, Rfl: 3  •  Flowflex COVID-19 Ag Home Test KIT, , Disp: , Rfl:   •  meloxicam (MOBIC) 7.5 mg tablet, TAKE 1 TABLET (7.5 MG TOTAL) BY MOUTH DAILY AS NEEDED FOR MODERATE PAIN (Patient not taking: Reported on 1/11/2024), Disp: 90 tablet, Rfl: 1  •  naproxen (NAPROSYN) 500 mg tablet, Take 1 tablet (500 mg total) by mouth 2 (two) times a day with meals for 10 days, Disp: 20 tablet, Rfl: 0          Whom besides the patient is providing clinical information about today's encounter?   NO ADDITIONAL HISTORIAN (patient alone provided history)    Physical Exam and Assessment/Plan by Diagnosis:      MELANOCYTIC NEVI  -Relevant exam: Scattered over the trunk/extremities are homogenously pigmented brown macules and papules. ELM performed and without concerning findings.  - Exam and clinical history consistent with melanocytic nevi  - Educated on the ABCDE's of melanoma; handout provided  - Counseled to return to clinic prior to scheduled appointment should any of these lesions change or should any new lesions of concern arise  - Counseled on use of sun protection daily. Reviewed latest FDA sunscreen guidelines, including use of broad spectrum (UVA and UVB blocking) sunscreen or sun protective clothing with SPF 30-50 every 2-3 hours and reapplied after exposure to water; use of photoprotective clothing, including a broad brim hat and UPF rated clothing if outdoors for several hours; avoid use of tanning beds as these pose significant risk for melanoma and skin cancer.          CONGENITAL MELANOCYTIC NEVUS    Physical Exam:  Anatomic Location Affected:  left sideburn  Morphological Description:  1 cm  Pertinent Positives:  Pertinent Negatives:      Assessment and Plan:  Based on a thorough discussion of this condition and the management approach to it (including a comprehensive  discussion of the known risks, side effects and potential benefits of treatment), the patient (family) agrees to implement the following specific plan:  Found on exam    What is a congenital melanocytic nevus?  A congenital melanocytic nevus is a proliferation of benign melanocytes that are present at birth or develop shortly after birth. This form of a congenital nevus is also known as a brown birthmark.  Similar melanocytic nevi, or moles that were not present at birth, are often called 'congenital melanocytic nevus-like' nevi, 'congenital type' nevi or 'tardive' nevi.    2013 Classification of congenital melanocytic nevi  In 2013, a new categorisation of congenital melanocytic nevi using predicted adult size was proposed:  Small (< 1.5 cm)  Medium (M1: 1.5-10 cm, M2: > 10-20 cm)  Large (L1: > 20-30 cm, L2: > 30-40 cm)  Giant (G1: > 40-60 cm, G2: > 60 cm)  Satellite nevi: none, 1-20, > 20-50, and > 50 satellites    Congenital melanocytic nevi should be described according to their body site, colors, surface features and whether or not there is hypertrichosis (hairs).    Progression over time  Congenital melanocytic nevi usually grow proportionally with the child. As a rough guide, the likely adult size of a congenital nevus can be calculated as follows:  Lower limbs: adult size is x 3.3 size at birth  Upper limbs/torso: adult size is x 2.8 size at birth  Head: adult size is x 1.7 size at birth.    Descriptive names of some congenital nevi  Some congenital nevi are given specific descriptive names. Some of these are listed here.  Speckled lentiginous nevus  Also called nevus spilus  Dark spots on a flat tan background  The number of spots may increase or decrease over time  Satellite lesions  Found on the periphery of central congenital melanocytic nevus or elsewhere on the body  Smaller melanocytic nevi similar in appearance to   Present in > 70% of patients with a large congenital melanocytic nevus  Tardive  nevus  Melanocytic nevus that appears after birth  Slower growth and less synthesis of melanin than congenital nevus  Histopathology is similar to true congenital melanocytic nevi  Garment nevus  The name relates to the anatomical location of nevus  Bathing trunk nevus involves central areas usually covered by a bathing costume, for example, buttocks  Coat sleeve nevus involves an entire arm and proximal shoulder  Halo nevus  Affects some congenital and tardive melanocytic nevi  Surrounding skin becomes lighter or white  The central lesion may also become lighter and smaller and may disappear  Due to immune destruction of melanocytes    How common are congenital melanocytic nevi?  Small congenital nevi occur in 1 in 100 births  Medium congenital nevi occur in 1 in 1000 births   Giant congenital melanocytic nevi are much rarer (1 in 20,000 live births)  They occur in all races and ethnic groups, and males and females are at equal risk.    What do congenital melanocytic nevi look like?  Congenital melanocytic nevi present as single or multi-shaded, round or oval-shaped pigmented patches. They may have increased hair growth (hypertrichosis). The surface may be slightly rough or bumpy.  Congenital nevi usually enlarge as the child grows but they may sometimes become smaller and less obvious with time. Rarely some may even disappear. However, they may also become darker, raised, more bumpy and hairy, particularly around the time of puberty.    Do congenital melanocytic nevi cause any symptoms?  Congenital melanocytic nevi are usually asymptomatic, however, some may be itchy, particularly larger lesions. It is thought there may be a reduced function of sebaceous (oil) and eccrine (sweat) glands, which may result in skin dryness and a heightened sensation of itch.  The overlying skin may become fragile and erode or ulcerate. Deep nests of melanocytes in the dermis may weaken the bonds between the epidermis and the dermis  and account for skin fragility.  Congenital melanocytic nevi are often unsightly, especially when extensive, ie large or giant congenital melanocytic nevi. They may, therefore, result in anxiety and impaired self-image, especially when the lesions are in visible areas.    Giant melanocytic nevi, and to a lesser degree small lesions, are associated with increased risk of developing cutaneous melanoma, neurocutaneous melanoma and rarely other tumours (see below).    What causes a congenital melanocytic nevus?  Congenital melanocytic nevi are caused by localised genetic abnormalities resulting in the proliferation of melanocytes; these are cells in the skin responsible for normal skin color. This abnormal proliferation is thought to occur between the 5th and 24th weeks of gestation. If proliferation starts early in development, giant and medium-sized congenital melanocytic nevi are formed. Smaller congenital melanocytic nevi are formed later in development after the melanoblasts (immature melanocytes) have migrated from the neural crest to the skin.    In some cases, there is also overgrowth of hair-forming cells and epidermis, forming an organoid nevus.  Very early onset of congenital nevus before the separation of the upper and lower eyelids results in kissing nevi, ie one part of the nevus is on the upper lid and the other part is on the lower eyelid.       How is the diagnosis of congenital melanocytic nevus made?  The diagnosis of a congenital melanocytic nevus is usually based on the clinical appearance. If there is any doubt, examining the lesion with dermoscopy or taking a sample of the lesion for histology (biopsy) may show characteristic microscopic features.    Dermoscopy  Evaluation of the congenital melanocytic nevus by dermoscopy will reveal the pattern of pigmentation and its symmetry or lack of symmetry. The most common global pattern of congenital or tardive melanocytic nevus is globular, but  reticular, structureless and mixed patterns may occur. The nevus may have differing structures across the lesion, sometimes leading to overall asymmetry of the structure.    Pathology  Congenital melanocytic nevi are usually larger than acquired nevi (which are melanocytic nevi that appear after 2 years of age), and the nevus cells often extend deeper into the dermis, fat layer, and deeper structures. The nevus cells characteristically cluster around blood vessels, hair follicles, sebaceous and eccrine glands, and other skin structures. Congenital nevus cells tend to involve collagen bundles in the deeper layers of the skin more than is the case in an acquired nevus.    Risk of developing melanoma within a congenital melanocytic nevus  The following characteristics of congenital melanocytic nevus are associated with the increased risk of development of melanoma (a skin cancer).  Large or giant size  Axial or paravertebral location (crossing the spine)  Multiple congenital satellite nevi  Neurocutaneous melanosis.    The risk of melanoma is mainly related to the size of the congenital melanocytic nevus. Small and medium-sized congenital melanocytic nevi have a very small risk, well under 1%. Melanoma is more likely to develop in giant congenital nevi (lifetime estimates are 5-10%), particularly in lesions that lie across the spine or where there are multiple satellite lesions. Melanoma can start deep inside the nevus or within any neuromelanosis found in the brain and spinal cord. Very rarely, other tissues that contain melanocytes may also be a source of melanoma such as the gastrointestinal tract mucosa. In 24% of cases, the origin of the melanoma cannot be identified.     Melanoma associated with a giant congenital melanocytic nevus or neuromelanosis can be very difficult to detect and treat.  The risk of development of melanoma is greater in early childhood; 70% of melanomas associated with giant congenital  melanocytic nevi are diagnosed by the age of ten years.    Rarely, other types of tumour may develop within giant congenital melanocytic nevi including benign tumours (lipomas, schwannomas) and other malignant tumours (including sarcomas).  Melanoma can also develop within a small congenital melanocytic nevus. This is rare and likely to occur on the periphery of the nevus during adult life.     Is regular follow-up recommended?  It can be useful to have a close-up photograph of the congenital nevus with a ruler beside it to assess for changes in size.  Digital surveillance using dermoscopic images (mole mapping) may also be helpful to detect changes in structure. However, such changes are normal in childhood and should not usually give rise to concern.  It is advisable to continue neurodevelopmental observation in those at risk of neurocutaneous melanosis.    Prognosis of melanoma associated with congenital melanocytic nevus  Unfortunately, when a rare melanoma arises within a giant congenital melanocytic nevus, the prognosis is unfavourable. This is due to the deeper origin of the tumour rendering it more difficult to detect on clinical examination, resulting in a later stage at presentation. The deeper location also facilitates earlier spread through blood and lymph vessels. In 24% of cases, the melanoma has already spread to other sites (metastases) at the time of the first diagnosis.    Treatment of a congenital melanocytic nevus  Management of a congenital melanocytic nevus must take into account the age of the subject, the lesion size, the location and depth, and the risk of developing malignant change within the lesion.    Giant congenital melanocytic nevus  The only definite indication for surgery in a giant congenital melanocytic nevus is when melanoma develops within it.    Small congenital nevus  If a small congenital nevus is growing at the same rate as the child and is not changing in any other way, the  usual practice is not to remove it until the child is old enough to co-operate with a local anaesthetic injection, usually around the age of 10 to 12 years. Even then, removal is not essential.  Reasons to consider surgical removal may include:  Unsightly appearance  Difficulty in observing the mole (eg, scalp, back)  A recent change in the lesion (darkening, lumpiness, increasing size)  Melanoma-like appearance (irregular shape, variegated color).    Prophylactic surgical removal of a nevus  The following factors should be considered prior to prophylactic surgical removal of a nevus.  Prophylactic excision of a small lesion may be delayed until an age when the patient is old enough to make an informed choice.  Small or medium-sized congenital melanocytic nevi are at low risk for developing malignant change.  Irregular, lumpy or thick lesions or lesions that are difficult to clinically assess may have a lower threshold for consideration of surgical excision, so as not to miss a melanoma.  50% of melanomas diagnosed in those with giant congenital melanocytic nevi occur at another body site such as within the central nervous system. Therefore surgical excision of the lesion may not eliminate the risk of melanoma.  Large or giant melanocytic lesions may be too large to excise completely.  Large lesions may require a skin flap or graft to close the surgical defect.    Complications of surgery  Complications that may occur after surgery include:  Graft or flap failure  Infection  Wound breakdown  Bleeding or haematoma  Hypertrophic or keloid scar  Irritable or itchy scar.  Other treatment options for a congenital melanocytic nevus    Dermabrasion  Dermabrasion can allow partial removal of a large congenital nevus; deeper nevus cells may persist. Dermabrasion may lighten the color of the nevus but may not reduce hair growth within it. It can cause scarring.    Tangential (shave) excision  Tangential or shave excision uses  "a blade to remove the top layers of the skin (epidermis and upper dermis). This may reduce the pigmentation but the lesion may not be completely removed. Shave excision may result in significant scarring.    Chemical peels  Chemical peels using trichloroacetic acid or phenol may lighten the pigmentation of a superficial (surface) congenital nevus that is located in the upper layers of the skin.    Laser ablation  Laser treatment is considered if surgical intervention is not possible. They may result in lightening of the lesion. Suitable devices include:  Radha Q-switched laser  Carbon dioxide resurfacing laser   SEBORRHEIC DERMATITIS    Physical Exam:  Anatomic Location Affected:  scalp  Morphological Description:crusted scale mid scalp  Pertinent Positives:  Pertinent Negatives:    Additional History of Present Condition:  found on exam    Assessment and Plan:  Based on a thorough discussion of this condition and the management approach to it (including a comprehensive discussion of the known risks, side effects and potential benefits of treatment), the patient (family) agrees to implement the following specific plan:  Triamcinolone Acetonide 0.025% Apply sparingly twice a day. Do not use more then 10 times a month      Seborrheic Dermatitis   Seborrheic dermatitis is a common, chronic or relapsing form of eczema/dermatitis that mainly affects the sebaceous, gland-rich regions of the scalp, face, and trunk.  There are infantile and adult forms of seborrhoeic dermatitis. It is sometimes associated with psoriasis and, in that clinical scenario, may be referred to as \"sebo-psoriasis.\"  Seborrheic dermatitis is also known as \"seborrheic eczema.\"  Dandruff (also called \"pityriasis capitis\") is an uninflamed form of seborrhoeic dermatitis. Dandruff presents as bran-like scaly patches scattered within hair-bearing areas of the scalp.  In an infant, this condition may be referred to as \"cradle cap.\"  The cause of seborrheic " "dermatitis is not completely understood. It is associated with proliferation of various species of the skin commensal Malassezia, in its yeast (non-pathogenic) form. Its metabolites (such as the fatty acids oleic acid, malssezin, and indole-3-carbaldehyde) may cause an inflammatory reaction. Differences in skin barrier lipid content and function may account for individual presentations.    Infantile Seborrheic Dermatitis  Infantile seborrheic dermatitis affects babies under the age of 3 months and usually resolves by 6-12 months of age.  Infantile seborrheic dermatitis causes \"cradle cap\" (diffuse, greasy scaling on scalp). The rash may spread to affect armpit and groin folds (a type of \"napkin dermatitis\").  There may be associated salmon-pink colored patches that may flake or peel.  The rash in this case is usually not especially itchy, so the baby often appears undisturbed by the rash, even when more generalized.    Adult Seborrheic Dermatitis  Adult seborrheic dermatitis tends to begin in late adolescence; prevalence is greatest in young adults and in the elderly. It is more common in males than in females.    The following factors are sometimes associated with severe adult seborrheic dermatitis:  Oily skin  Familial tendency to seborrhoeic dermatitis or a family history of psoriasis  Immunosuppression: organ transplant recipient, human immunodeficiency virus (HIV) infection and patients with lymphoma  Neurological and psychiatric diseases: Parkinson disease, tardive dyskinesia, depression, epilepsy, facial nerve palsy, spinal cord injury and congenital disorders such as Down syndrome  Treatment for psoriasis with psoralen and ultraviolet A (PUVA) therapy  Lack of sleep  Stressful events.    In adults, seborrheic dermatitis may typically affect the scalp, face (creases around the nose, behind ears, within eyebrows) and upper trunk. Typical clinical features include:  Winter flares, improving in summer following " sun exposure  Minimal itch most of the time  Combination oily and dry mid-facial skin  Ill-defined localized scaly patches or diffuse scale in the scalp  Blepharitis; scaly red eyelid margins  Mulino-pink, thin, scaly, and ill-defined plaques in skin folds on both sides of the face  Petal or ring-shaped flaky patches on hair-line and on anterior chest  Rash in armpits, under the breasts, in the groin folds and genital creases  Superficial folliculitis (inflamed hair follicles) on cheeks and upper trunk    Seborrheic dermatitis is diagnosed by its clinical appearance and behavior. Skin biopsy may be helpful but is rarely necessary to make this diagnosis.     Beckers Mole    Physical Exam:  Anatomic Location Affected:  10 cm than plauque with dark terminal hairs.  Morphological Description: 1 cm hyperpigmented patch with  Pertinent Positives:  Pertinent Negatives:    Additional History of Present Condition:  Found on exam    Assessment and Plan:  Based on a thorough discussion of this condition and the management approach to it (including a comprehensive discussion of the known risks, side effects and potential benefits of treatment), the patient (family) agrees to implement the following specific plan:  Monitor pictures were taken in the office today   Assured benign      DERMATOFIBROMA    Physical Exam:  Anatomic Location Affected:  left shoulder  Morphological Description:  1 cm  Pertinent Positives:  Pertinent Negatives:    Additional History of Present Condition:  Found on exam    Assessment and Plan:  Based on a thorough discussion of this condition and the management approach to it (including a comprehensive discussion of the known risks, side effects and potential benefits of treatment), the patient (family) agrees to implement the following specific plan:  Monitor for any changes  Assured benign    Assessment and Plan:  A dermatofibroma is a common benign fibrous nodule that most often arises on the skin of  "the lower legs.  A dermatofibroma is also called a \"cutaneous fibrous histiocytoma.\"  Dermatofibromas occur at all ages and in people of every ethnicity. They are more common in women than in men.    It is not clear if dermatofibroma is a reactive process or if it is a neoplasm. The lesions are made up of proliferating fibroblasts. Histiocytes may also be involved.  They are sometimes attributed to an insect bite or ingrownhair or local trauma, but not consistently. They may be more numerous in patients with altered immunity.    Dermatofibromas most often occur on the legs and arms, but may also arise on the trunk or any site of the body.  Typical clinical features include the following:  People may have 1 or up to 15 lesions.  Size varies from 0.5-1.5 cm diameter; most lesions are 7-10 mm diameter.  They are firm nodules tethered to the skin surface and mobile over subcutaneous tissue.  The skin \"dimples\" on pinching the lesion.  Color may be pink to light brown in white skin, and dark brown to black in dark skin; some appear paler in the center.  They do not usually cause symptoms, but they are sometimes painful or itchy.  Because they are often raised lesions, they may be traumatized, for example by a razor.  Occasionally dozens may erupt within a few months, usually in the setting of immunosuppression (for example autoimmune disease, cancer or certain medications).  Dermatofibroma does not give rise to cancer. However, occasionally, it may be mistaken for dermatofibrosarcoma or desmoplastic melanoma.    A dermatofibroma is harmless and seldom causes any symptoms. Usually, only reassurance is needed. If it is nuisance or causing concern, the lesion can be removed surgically, resulting in a scar that is, by definition, usually longer in diameter than the widest portion of the dermatofibroma.  Cryotherapy, shave biopsy and laser surgery are rarely completely successful.  Skin punch biopsy or incisional biopsy may " be undertaken if there is an atypical feature such as recent enlargement, ulceration, or asymmetrical structures and colours on dermatoscopy.       Scribe Attestation    I,:  Geeta Dang MA am acting as a scribe while in the presence of the attending physician.:       I,:  Bunny Funk MD personally performed the services described in this documentation    as scribed in my presence.:

## 2024-01-11 NOTE — PATIENT INSTRUCTIONS
MELANOCYTIC NEVI  -Relevant exam: Scattered over the trunk/extremities are homogenously pigmented brown macules and papules. ELM performed and without concerning findings.  - Exam and clinical history consistent with melanocytic nevi  - Educated on the ABCDE's of melanoma; handout provided  - Counseled to return to clinic prior to scheduled appointment should any of these lesions change or should any new lesions of concern arise  - Counseled on use of sun protection daily. Reviewed latest FDA sunscreen guidelines, including use of broad spectrum (UVA and UVB blocking) sunscreen or sun protective clothing with SPF 30-50 every 2-3 hours and reapplied after exposure to water; use of photoprotective clothing, including a broad brim hat and UPF rated clothing if outdoors for several hours; avoid use of tanning beds as these pose significant risk for melanoma and skin cancer.          CONGENITAL MELANOCYTIC NEVUS      Assessment and Plan:  Based on a thorough discussion of this condition and the management approach to it (including a comprehensive discussion of the known risks, side effects and potential benefits of treatment), the patient (family) agrees to implement the following specific plan:  Found on exam    What is a congenital melanocytic nevus?  A congenital melanocytic nevus is a proliferation of benign melanocytes that are present at birth or develop shortly after birth. This form of a congenital nevus is also known as a brown birthmark.  Similar melanocytic nevi, or moles that were not present at birth, are often called 'congenital melanocytic nevus-like' nevi, 'congenital type' nevi or 'tardive' nevi.    2013 Classification of congenital melanocytic nevi  In 2013, a new categorisation of congenital melanocytic nevi using predicted adult size was proposed:  Small (< 1.5 cm)  Medium (M1: 1.5-10 cm, M2: > 10-20 cm)  Large (L1: > 20-30 cm, L2: > 30-40 cm)  Giant (G1: > 40-60 cm, G2: > 60 cm)  Satellite nevi:  none, 1-20, > 20-50, and > 50 satellites    Congenital melanocytic nevi should be described according to their body site, colors, surface features and whether or not there is hypertrichosis (hairs).    Progression over time  Congenital melanocytic nevi usually grow proportionally with the child. As a rough guide, the likely adult size of a congenital nevus can be calculated as follows:  Lower limbs: adult size is x 3.3 size at birth  Upper limbs/torso: adult size is x 2.8 size at birth  Head: adult size is x 1.7 size at birth.    Descriptive names of some congenital nevi  Some congenital nevi are given specific descriptive names. Some of these are listed here.  Speckled lentiginous nevus  Also called nevus spilus  Dark spots on a flat tan background  The number of spots may increase or decrease over time  Satellite lesions  Found on the periphery of central congenital melanocytic nevus or elsewhere on the body  Smaller melanocytic nevi similar in appearance to   Present in > 70% of patients with a large congenital melanocytic nevus  Tardive nevus  Melanocytic nevus that appears after birth  Slower growth and less synthesis of melanin than congenital nevus  Histopathology is similar to true congenital melanocytic nevi  Garment nevus  The name relates to the anatomical location of nevus  Bathing trunk nevus involves central areas usually covered by a bathing costume, for example, buttocks  Coat sleeve nevus involves an entire arm and proximal shoulder  Halo nevus  Affects some congenital and tardive melanocytic nevi  Surrounding skin becomes lighter or white  The central lesion may also become lighter and smaller and may disappear  Due to immune destruction of melanocytes    How common are congenital melanocytic nevi?  Small congenital nevi occur in 1 in 100 births  Medium congenital nevi occur in 1 in 1000 births   Giant congenital melanocytic nevi are much rarer (1 in 20,000 live births)  They occur in all races and  ethnic groups, and males and females are at equal risk.    What do congenital melanocytic nevi look like?  Congenital melanocytic nevi present as single or multi-shaded, round or oval-shaped pigmented patches. They may have increased hair growth (hypertrichosis). The surface may be slightly rough or bumpy.  Congenital nevi usually enlarge as the child grows but they may sometimes become smaller and less obvious with time. Rarely some may even disappear. However, they may also become darker, raised, more bumpy and hairy, particularly around the time of puberty.    Do congenital melanocytic nevi cause any symptoms?  Congenital melanocytic nevi are usually asymptomatic, however, some may be itchy, particularly larger lesions. It is thought there may be a reduced function of sebaceous (oil) and eccrine (sweat) glands, which may result in skin dryness and a heightened sensation of itch.  The overlying skin may become fragile and erode or ulcerate. Deep nests of melanocytes in the dermis may weaken the bonds between the epidermis and the dermis and account for skin fragility.  Congenital melanocytic nevi are often unsightly, especially when extensive, ie large or giant congenital melanocytic nevi. They may, therefore, result in anxiety and impaired self-image, especially when the lesions are in visible areas.    Giant melanocytic nevi, and to a lesser degree small lesions, are associated with increased risk of developing cutaneous melanoma, neurocutaneous melanoma and rarely other tumours (see below).    What causes a congenital melanocytic nevus?  Congenital melanocytic nevi are caused by localised genetic abnormalities resulting in the proliferation of melanocytes; these are cells in the skin responsible for normal skin color. This abnormal proliferation is thought to occur between the 5th and 24th weeks of gestation. If proliferation starts early in development, giant and medium-sized congenital melanocytic nevi are  formed. Smaller congenital melanocytic nevi are formed later in development after the melanoblasts (immature melanocytes) have migrated from the neural crest to the skin.    In some cases, there is also overgrowth of hair-forming cells and epidermis, forming an organoid nevus.  Very early onset of congenital nevus before the separation of the upper and lower eyelids results in kissing nevi, ie one part of the nevus is on the upper lid and the other part is on the lower eyelid.       How is the diagnosis of congenital melanocytic nevus made?  The diagnosis of a congenital melanocytic nevus is usually based on the clinical appearance. If there is any doubt, examining the lesion with dermoscopy or taking a sample of the lesion for histology (biopsy) may show characteristic microscopic features.    Dermoscopy  Evaluation of the congenital melanocytic nevus by dermoscopy will reveal the pattern of pigmentation and its symmetry or lack of symmetry. The most common global pattern of congenital or tardive melanocytic nevus is globular, but reticular, structureless and mixed patterns may occur. The nevus may have differing structures across the lesion, sometimes leading to overall asymmetry of the structure.    Pathology  Congenital melanocytic nevi are usually larger than acquired nevi (which are melanocytic nevi that appear after 2 years of age), and the nevus cells often extend deeper into the dermis, fat layer, and deeper structures. The nevus cells characteristically cluster around blood vessels, hair follicles, sebaceous and eccrine glands, and other skin structures. Congenital nevus cells tend to involve collagen bundles in the deeper layers of the skin more than is the case in an acquired nevus.    Risk of developing melanoma within a congenital melanocytic nevus  The following characteristics of congenital melanocytic nevus are associated with the increased risk of development of melanoma (a skin cancer).  Large or  giant size  Axial or paravertebral location (crossing the spine)  Multiple congenital satellite nevi  Neurocutaneous melanosis.    The risk of melanoma is mainly related to the size of the congenital melanocytic nevus. Small and medium-sized congenital melanocytic nevi have a very small risk, well under 1%. Melanoma is more likely to develop in giant congenital nevi (lifetime estimates are 5-10%), particularly in lesions that lie across the spine or where there are multiple satellite lesions. Melanoma can start deep inside the nevus or within any neuromelanosis found in the brain and spinal cord. Very rarely, other tissues that contain melanocytes may also be a source of melanoma such as the gastrointestinal tract mucosa. In 24% of cases, the origin of the melanoma cannot be identified.     Melanoma associated with a giant congenital melanocytic nevus or neuromelanosis can be very difficult to detect and treat.  The risk of development of melanoma is greater in early childhood; 70% of melanomas associated with giant congenital melanocytic nevi are diagnosed by the age of ten years.    Rarely, other types of tumour may develop within giant congenital melanocytic nevi including benign tumours (lipomas, schwannomas) and other malignant tumours (including sarcomas).  Melanoma can also develop within a small congenital melanocytic nevus. This is rare and likely to occur on the periphery of the nevus during adult life.     Is regular follow-up recommended?  It can be useful to have a close-up photograph of the congenital nevus with a ruler beside it to assess for changes in size.  Digital surveillance using dermoscopic images (mole mapping) may also be helpful to detect changes in structure. However, such changes are normal in childhood and should not usually give rise to concern.  It is advisable to continue neurodevelopmental observation in those at risk of neurocutaneous melanosis.    Prognosis of melanoma associated  with congenital melanocytic nevus  Unfortunately, when a rare melanoma arises within a giant congenital melanocytic nevus, the prognosis is unfavourable. This is due to the deeper origin of the tumour rendering it more difficult to detect on clinical examination, resulting in a later stage at presentation. The deeper location also facilitates earlier spread through blood and lymph vessels. In 24% of cases, the melanoma has already spread to other sites (metastases) at the time of the first diagnosis.    Treatment of a congenital melanocytic nevus  Management of a congenital melanocytic nevus must take into account the age of the subject, the lesion size, the location and depth, and the risk of developing malignant change within the lesion.    Giant congenital melanocytic nevus  The only definite indication for surgery in a giant congenital melanocytic nevus is when melanoma develops within it.    Small congenital nevus  If a small congenital nevus is growing at the same rate as the child and is not changing in any other way, the usual practice is not to remove it until the child is old enough to co-operate with a local anaesthetic injection, usually around the age of 10 to 12 years. Even then, removal is not essential.  Reasons to consider surgical removal may include:  Unsightly appearance  Difficulty in observing the mole (eg, scalp, back)  A recent change in the lesion (darkening, lumpiness, increasing size)  Melanoma-like appearance (irregular shape, variegated color).    Prophylactic surgical removal of a nevus  The following factors should be considered prior to prophylactic surgical removal of a nevus.  Prophylactic excision of a small lesion may be delayed until an age when the patient is old enough to make an informed choice.  Small or medium-sized congenital melanocytic nevi are at low risk for developing malignant change.  Irregular, lumpy or thick lesions or lesions that are difficult to clinically  assess may have a lower threshold for consideration of surgical excision, so as not to miss a melanoma.  50% of melanomas diagnosed in those with giant congenital melanocytic nevi occur at another body site such as within the central nervous system. Therefore surgical excision of the lesion may not eliminate the risk of melanoma.  Large or giant melanocytic lesions may be too large to excise completely.  Large lesions may require a skin flap or graft to close the surgical defect.    Complications of surgery  Complications that may occur after surgery include:  Graft or flap failure  Infection  Wound breakdown  Bleeding or haematoma  Hypertrophic or keloid scar  Irritable or itchy scar.  Other treatment options for a congenital melanocytic nevus    Dermabrasion  Dermabrasion can allow partial removal of a large congenital nevus; deeper nevus cells may persist. Dermabrasion may lighten the color of the nevus but may not reduce hair growth within it. It can cause scarring.    Tangential (shave) excision  Tangential or shave excision uses a blade to remove the top layers of the skin (epidermis and upper dermis). This may reduce the pigmentation but the lesion may not be completely removed. Shave excision may result in significant scarring.    Chemical peels  Chemical peels using trichloroacetic acid or phenol may lighten the pigmentation of a superficial (surface) congenital nevus that is located in the upper layers of the skin.    Laser ablation  Laser treatment is considered if surgical intervention is not possible. They may result in lightening of the lesion. Suitable devices include:  Radha Q-switched laser  Carbon dioxide resurfacing laser   SEBORRHEIC DERMATITIS      Assessment and Plan:  Based on a thorough discussion of this condition and the management approach to it (including a comprehensive discussion of the known risks, side effects and potential benefits of treatment), the patient (family) agrees to  "implement the following specific plan:  Triamcinolone Acetonide 0.025% Apply sparingly twice a day. Do not use more then 10 times a month      Seborrheic Dermatitis   Seborrheic dermatitis is a common, chronic or relapsing form of eczema/dermatitis that mainly affects the sebaceous, gland-rich regions of the scalp, face, and trunk.  There are infantile and adult forms of seborrhoeic dermatitis. It is sometimes associated with psoriasis and, in that clinical scenario, may be referred to as \"sebo-psoriasis.\"  Seborrheic dermatitis is also known as \"seborrheic eczema.\"  Dandruff (also called \"pityriasis capitis\") is an uninflamed form of seborrhoeic dermatitis. Dandruff presents as bran-like scaly patches scattered within hair-bearing areas of the scalp.  In an infant, this condition may be referred to as \"cradle cap.\"  The cause of seborrheic dermatitis is not completely understood. It is associated with proliferation of various species of the skin commensal Malassezia, in its yeast (non-pathogenic) form. Its metabolites (such as the fatty acids oleic acid, malssezin, and indole-3-carbaldehyde) may cause an inflammatory reaction. Differences in skin barrier lipid content and function may account for individual presentations.    Infantile Seborrheic Dermatitis  Infantile seborrheic dermatitis affects babies under the age of 3 months and usually resolves by 6-12 months of age.  Infantile seborrheic dermatitis causes \"cradle cap\" (diffuse, greasy scaling on scalp). The rash may spread to affect armpit and groin folds (a type of \"napkin dermatitis\").  There may be associated salmon-pink colored patches that may flake or peel.  The rash in this case is usually not especially itchy, so the baby often appears undisturbed by the rash, even when more generalized.    Adult Seborrheic Dermatitis  Adult seborrheic dermatitis tends to begin in late adolescence; prevalence is greatest in young adults and in the elderly. It is more " common in males than in females.    The following factors are sometimes associated with severe adult seborrheic dermatitis:  Oily skin  Familial tendency to seborrhoeic dermatitis or a family history of psoriasis  Immunosuppression: organ transplant recipient, human immunodeficiency virus (HIV) infection and patients with lymphoma  Neurological and psychiatric diseases: Parkinson disease, tardive dyskinesia, depression, epilepsy, facial nerve palsy, spinal cord injury and congenital disorders such as Down syndrome  Treatment for psoriasis with psoralen and ultraviolet A (PUVA) therapy  Lack of sleep  Stressful events.    In adults, seborrheic dermatitis may typically affect the scalp, face (creases around the nose, behind ears, within eyebrows) and upper trunk. Typical clinical features include:  Winter flares, improving in summer following sun exposure  Minimal itch most of the time  Combination oily and dry mid-facial skin  Ill-defined localized scaly patches or diffuse scale in the scalp  Blepharitis; scaly red eyelid margins  Vanzant-pink, thin, scaly, and ill-defined plaques in skin folds on both sides of the face  Petal or ring-shaped flaky patches on hair-line and on anterior chest  Rash in armpits, under the breasts, in the groin folds and genital creases  Superficial folliculitis (inflamed hair follicles) on cheeks and upper trunk    Seborrheic dermatitis is diagnosed by its clinical appearance and behavior. Skin biopsy may be helpful but is rarely necessary to make this diagnosis.     Beckers Mole      Assessment and Plan:  Based on a thorough discussion of this condition and the management approach to it (including a comprehensive discussion of the known risks, side effects and potential benefits of treatment), the patient (family) agrees to implement the following specific plan:  Monitor pictures were taken in the office today   Assured benign      DERMATOFIBROMA    Assessment and Plan:  Based on a  "thorough discussion of this condition and the management approach to it (including a comprehensive discussion of the known risks, side effects and potential benefits of treatment), the patient (family) agrees to implement the following specific plan:  Monitor for any changes  Assured benign    Assessment and Plan:  A dermatofibroma is a common benign fibrous nodule that most often arises on the skin of the lower legs.  A dermatofibroma is also called a \"cutaneous fibrous histiocytoma.\"  Dermatofibromas occur at all ages and in people of every ethnicity. They are more common in women than in men.    It is not clear if dermatofibroma is a reactive process or if it is a neoplasm. The lesions are made up of proliferating fibroblasts. Histiocytes may also be involved.  They are sometimes attributed to an insect bite or ingrownhair or local trauma, but not consistently. They may be more numerous in patients with altered immunity.    Dermatofibromas most often occur on the legs and arms, but may also arise on the trunk or any site of the body.  Typical clinical features include the following:  People may have 1 or up to 15 lesions.  Size varies from 0.5-1.5 cm diameter; most lesions are 7-10 mm diameter.  They are firm nodules tethered to the skin surface and mobile over subcutaneous tissue.  The skin \"dimples\" on pinching the lesion.  Color may be pink to light brown in white skin, and dark brown to black in dark skin; some appear paler in the center.  They do not usually cause symptoms, but they are sometimes painful or itchy.  Because they are often raised lesions, they may be traumatized, for example by a razor.  Occasionally dozens may erupt within a few months, usually in the setting of immunosuppression (for example autoimmune disease, cancer or certain medications).  Dermatofibroma does not give rise to cancer. However, occasionally, it may be mistaken for dermatofibrosarcoma or desmoplastic melanoma.    A " dermatofibroma is harmless and seldom causes any symptoms. Usually, only reassurance is needed. If it is nuisance or causing concern, the lesion can be removed surgically, resulting in a scar that is, by definition, usually longer in diameter than the widest portion of the dermatofibroma.  Cryotherapy, shave biopsy and laser surgery are rarely completely successful.  Skin punch biopsy or incisional biopsy may be undertaken if there is an atypical feature such as recent enlargement, ulceration, or asymmetrical structures and colours on dermatoscopy.

## 2024-01-12 ENCOUNTER — OFFICE VISIT (OUTPATIENT)
Dept: PHYSICAL THERAPY | Facility: MEDICAL CENTER | Age: 34
End: 2024-01-12
Payer: COMMERCIAL

## 2024-01-12 DIAGNOSIS — M77.11 LATERAL EPICONDYLITIS OF BOTH ELBOWS: Primary | ICD-10-CM

## 2024-01-12 DIAGNOSIS — M77.12 LATERAL EPICONDYLITIS OF BOTH ELBOWS: Primary | ICD-10-CM

## 2024-01-12 DIAGNOSIS — M77.8 TENDINITIS OF BOTH WRISTS: ICD-10-CM

## 2024-01-12 PROCEDURE — 97140 MANUAL THERAPY 1/> REGIONS: CPT | Performed by: PHYSICAL THERAPIST

## 2024-01-12 PROCEDURE — 97112 NEUROMUSCULAR REEDUCATION: CPT | Performed by: PHYSICAL THERAPIST

## 2024-01-12 PROCEDURE — 97110 THERAPEUTIC EXERCISES: CPT | Performed by: PHYSICAL THERAPIST

## 2024-01-12 NOTE — PROGRESS NOTES
Daily Note     Today's date: 2024  Patient name: Donald Collazo  : 1990  MRN: [id number]  Referring provider: Roberth Nowak P*  Dx:   Encounter Diagnosis     ICD-10-CM    1. Lateral epicondylitis of both elbows  M77.11     M77.12       2. Tendinitis of both wrists  M77.8                      Subjective: Patient continues to note relief with KT. States symptoms are most evident at the left thumb but states his elbow and shoulders do still bother him as well.       Objective: See treatment diary below      Assessment:  Continued with manual therapy for soft tissue mobilization and symptom management. Patient demonstrated improvements in postural mobility and motor control of the scapular and postural stabilizers. Progressed serratus activation to wall today which was more challenging. We did have a length discussion on activity modifications with cell phone use and gripping/twisting motions to minimize stress at the elbow and thumb as repetitive motions like these are shown to contribute to chronic thumb/wrist/elbow tendon injuries. Patient had no pain during exercise performance with exception of wrist pain with one exercise that was quickly resolved with cueing for wrist positioning. Provided patient with updated HEP. Patient would benefit from continued PT for postural strengthening and graded exercise for symptom management and improve tolerance to activity.       Plan: Continue per plan of care.  Progress treatment as tolerated.  Custom orthosis as needed -currently suing KT     Precautions: n/a    HEP: thoracic extension stretch, scap retractions, extensor compartment stretch  Manuals          L RH volar joint mobs nv Gr. III-IV Gr. III-IV Gr. III_IV         Upper/mid t/s joint mobs nv Gr. III-IV Gr. III_IV np         Posterior capsule stretch (B) nv CK CK np         TFM EDC/ ECRB/L (L), APL/EPB (L) nv CK CK CK         KT 1st dorsal compartment   CK CK        "  Neuro Re-Ed             Scap-4 nv No band x10 ea YTB x20 ea RTB x20 ea         Prone raises nv X10 ea 5\" x10 ea 5\" x10 ea                                                                          Ther Ex             T/s extension stretch with foam roller nv 5\" X10-15 5\" x10-15 5\" x10         Supine SA punches  3\"x20 3\"x30 np         Sidelying ER (B) nv x20 X30 ea 3x15 ea         Foam roller stretch at wall nv 10\" x10 10\" x10 10\" x10         SA walks at wall    YTB x5 rounds                                                             Ther Activity                                       Gait Training                                       Modalities                                            "

## 2024-01-16 ENCOUNTER — OFFICE VISIT (OUTPATIENT)
Dept: UROLOGY | Facility: CLINIC | Age: 34
End: 2024-01-16
Payer: COMMERCIAL

## 2024-01-16 VITALS
DIASTOLIC BLOOD PRESSURE: 84 MMHG | BODY MASS INDEX: 33.8 KG/M2 | HEIGHT: 68 IN | OXYGEN SATURATION: 97 % | SYSTOLIC BLOOD PRESSURE: 128 MMHG | WEIGHT: 223 LBS | HEART RATE: 96 BPM

## 2024-01-16 DIAGNOSIS — N45.1 EPIDIDYMITIS: Primary | ICD-10-CM

## 2024-01-16 LAB
SL AMB  POCT GLUCOSE, UA: NORMAL
SL AMB LEUKOCYTE ESTERASE,UA: NORMAL
SL AMB POCT BILIRUBIN,UA: NORMAL
SL AMB POCT BLOOD,UA: NORMAL
SL AMB POCT CLARITY,UA: CLEAR
SL AMB POCT COLOR,UA: YELLOW
SL AMB POCT KETONES,UA: NORMAL
SL AMB POCT NITRITE,UA: NORMAL
SL AMB POCT PH,UA: 5
SL AMB POCT SPECIFIC GRAVITY,UA: 1.02
SL AMB POCT URINE PROTEIN: NORMAL
SL AMB POCT UROBILINOGEN: 0.2

## 2024-01-16 PROCEDURE — 81002 URINALYSIS NONAUTO W/O SCOPE: CPT | Performed by: UROLOGY

## 2024-01-16 PROCEDURE — 99204 OFFICE O/P NEW MOD 45 MIN: CPT | Performed by: UROLOGY

## 2024-01-16 RX ORDER — GABAPENTIN 300 MG/1
300 CAPSULE ORAL 3 TIMES DAILY
Qty: 90 CAPSULE | Refills: 0 | Status: SHIPPED | OUTPATIENT
Start: 2024-01-16

## 2024-01-16 NOTE — PROGRESS NOTES
ASSESSMENT:     33 y.o. male  with testicular pain    PLAN:     It is unclear that the patient has had true bacterial infections or epididymal orchitis.  This would be less likely in his age range.  Prior STI testing is negative and the patient is in a stable relationship.  I discussed that moving forward, the patient should not be empirically treated with antibiotics without formal urine testing that demonstrates a bacterial infection.  Knows to contact our office when symptoms recur for urine drop-off.    I suspect this may be more of an inflammatory pain as the patient has multiple musculoskeletal complaints.  He is following with physical therapy with a plan to focus on his lower extremity complaints.  As such, I will not refer the patient to pelvic floor physical therapy at the current time but I am hopeful that the physical therapy team can evaluate lumbosacral spine and pelvic floor muscles as part of their workup.      Baseline scrotal ultrasound will be performed.  This is more to rule out any on recognized abnormalities.    I did offer the patient a trial of gabapentin 300 mg be taken at night to help reduce neuropathic pain.            ----          UROLOGY NEW CONSULT NOTE     CHIEF COMPLAINT   Donald Collazo is a 33 y.o. male with a complaint of   Chief Complaint   Patient presents with    Epididymitis       History of Present Illness:   Donald Collazo is a 33 y.o. male here for evaluation of testicular pain.  Patient reports multiple episodes of epididymal orchitis.  Has had separate visits to the emergency room and urgent care in the past treatment with doxycycline.  Patient's fiancé is a nurse practitioner and has prescribed additional empiric antibiotics on several occasions.  Patient reports a dull throbbing pain in his lower back, abdomen and testicle on the left side.  He is also had right flank pain and musculoskeletal complaints.  He also reports some occasional difficulty  with urination.  No blood in the urine or fevers or chills.    Urinary Score(s)         AUA SYMPTOM SCORE      Flowsheet Row Most Recent Value   AUA SYMPTOM SCORE    How often have you had a sensation of not emptying your bladder completely after you finished urinating? 0 (P)    How often have you had to urinate again less than two hours after you finished urinating? 0 (P)    How often have you found you stopped and started again several times when you urinate? 0 (P)    How often have you found it difficult to postpone urination? 0 (P)    How often have you had a weak urinary stream? 0 (P)    How often have you had to push or strain to begin urination? 0 (P)    How many times did you most typically get up to urinate from the time you went to bed at night until the time you got up in the morning? 0 (P)    Quality of Life: If you were to spend the rest of your life with your urinary condition just the way it is now, how would you feel about that? 0 (P)    AUA SYMPTOM SCORE 0 (P)                Past Medical History:     Past Medical History:   Diagnosis Date    Migraine        PAST SURGICAL HISTORY:     Past Surgical History:   Procedure Laterality Date    KNEE ARTHROSCOPY W/ ACL RECONSTRUCTION      has had it three times between two knees       CURRENT MEDICATIONS:     Current Outpatient Medications   Medication Sig Dispense Refill    gabapentin (Neurontin) 300 mg capsule Take 1 capsule (300 mg total) by mouth 3 (three) times a day 90 capsule 0    meloxicam (Mobic) 15 mg tablet Take 1 tablet (15 mg total) by mouth daily 30 tablet 0    Triamcinolone Acetonide 0.025 % LOTN Apply topically 2 (two) times a day Apply sparingly twice a day. Do not use more then 10 times a month 60 mL 3    doxycycline hyclate (VIBRAMYCIN) 100 mg capsule TAKE 1 CAPSULE BY MOUTH TWICE A DAY FOR 10 DAYS (Patient not taking: Reported on 1/16/2024)      Flowflex COVID-19 Ag Home Test KIT  (Patient not taking: Reported on 12/1/2023)      meloxicam  (MOBIC) 7.5 mg tablet TAKE 1 TABLET (7.5 MG TOTAL) BY MOUTH DAILY AS NEEDED FOR MODERATE PAIN (Patient not taking: Reported on 1/11/2024) 90 tablet 1     No current facility-administered medications for this visit.       ALLERGIES:     Allergies   Allergen Reactions    Sulfa Antibiotics        SOCIAL HISTORY:     Social History     Socioeconomic History    Marital status: Single     Spouse name: Not on file    Number of children: Not on file    Years of education: Not on file    Highest education level: Not on file   Occupational History    Not on file   Tobacco Use    Smoking status: Never    Smokeless tobacco: Never   Vaping Use    Vaping status: Never Used   Substance and Sexual Activity    Alcohol use: Yes     Comment: socially     Drug use: Never    Sexual activity: Not on file   Other Topics Concern    Not on file   Social History Narrative    Consumes 4-5 cups of coffee per week     Social Determinants of Health     Financial Resource Strain: Not on file   Food Insecurity: Not on file   Transportation Needs: Not on file   Physical Activity: Not on file   Stress: Not on file   Social Connections: Not on file   Intimate Partner Violence: Not on file   Housing Stability: Not on file       SOCIAL HISTORY:     Family History   Problem Relation Age of Onset    No Known Problems Mother     Heart disease Father     Hypertension Father     Myasthenia gravis Father     Asthma Sister     Asthma Brother     Hypertension Maternal Grandmother     Seizures Maternal Grandfather     Glaucoma Maternal Grandfather     Hypertension Maternal Grandfather     Leukemia Maternal Grandfather     Migraines Paternal Grandmother     Breast cancer Paternal Grandmother     Hypertension Paternal Grandfather        REVIEW OF SYSTEMS:     Review of Systems   Constitutional:  Negative for chills and fever.   HENT:  Negative for ear pain and sore throat.    Eyes:  Negative for pain and visual disturbance.   Respiratory:  Negative for cough and  "shortness of breath.    Cardiovascular:  Negative for chest pain and palpitations.   Gastrointestinal:  Negative for abdominal pain and vomiting.   Genitourinary:  Positive for difficulty urinating and testicular pain. Negative for dysuria and hematuria.   Musculoskeletal:  Positive for back pain and myalgias. Negative for arthralgias.   Skin:  Negative for color change and rash.   Neurological:  Negative for seizures and syncope.   All other systems reviewed and are negative.        PHYSICAL EXAM:     /84 (BP Location: Left arm, Patient Position: Sitting, Cuff Size: Standard)   Pulse 96   Ht 5' 8\" (1.727 m)   Wt 101 kg (223 lb)   SpO2 97%   BMI 33.91 kg/m²     Physical Exam  Vitals reviewed.   Constitutional:       General: He is not in acute distress.     Appearance: He is well-developed.   HENT:      Head: Normocephalic and atraumatic.   Eyes:      Pupils: Pupils are equal, round, and reactive to light.   Cardiovascular:      Rate and Rhythm: Normal rate.   Pulmonary:      Effort: Pulmonary effort is normal. No respiratory distress.      Breath sounds: Normal breath sounds.   Abdominal:      General: There is no distension.      Palpations: Abdomen is soft.      Tenderness: There is no abdominal tenderness.   Genitourinary:     Penis: Normal.       Testes: Normal.      Comments: No abnormalities on exam  Musculoskeletal:         General: Normal range of motion.      Cervical back: Normal range of motion and neck supple.   Skin:     General: Skin is warm and dry.   Neurological:      Mental Status: He is alert and oriented to person, place, and time.   Psychiatric:         Behavior: Behavior normal.         LABS:     CBC:   Lab Results   Component Value Date    WBC 8.95 10/11/2023    HGB 15.2 10/11/2023    HCT 44.1 10/11/2023    MCV 88 10/11/2023     10/11/2023       BMP:   Lab Results   Component Value Date    CALCIUM 9.3 10/11/2023    K 4.2 10/11/2023    CO2 24 10/11/2023     10/11/2023 "    BUN 16 10/11/2023    CREATININE 1.06 10/11/2023     1/16/23  5:06 PM    N gonorrhoeae, DNA Probe  Negative Negative   Chlamydia trachomatis, DNA Probe  Negative Negative       IMAGING:     10/5/23  Narrative & Impression   CT ABDOMEN AND PELVIS WITHOUT IV CONTRAST - LOW DOSE RENAL STONE     INDICATION:   Flank pain, kidney stone suspected  Right flank pain.        COMPARISON:  None.     TECHNIQUE:  Low radiation dose thin section CT examination of the abdomen and pelvis was performed without intravenous or oral contrast according to a protocol specifically designed to evaluate for urinary tract calculus.  Axial, sagittal, and coronal 2D   reformatted images were created from the source data and submitted for interpretation.  Evaluation for pathology in the abdomen and pelvis that is unrelated to urinary tract calculi is limited.     Radiation dose length product (DLP) for this visit:  492 mGy-cm .  This examination, like all CT scans performed in the [facility], was performed utilizing techniques to minimize radiation dose exposure, including the use of iterative   reconstruction and automated exposure control.     URINARY TRACT FINDINGS:     RIGHT KIDNEY AND URETER:  No urinary tract calculi.  No hydronephrosis or hydroureter.     LEFT KIDNEY AND URETER:  No urinary tract calculi.  No hydronephrosis or hydroureter.     URINARY BLADDER:  Unremarkable.        ADDITIONAL FINDINGS:     LOWER CHEST:  No clinically significant abnormality identified in the visualized lower chest.     SOLID VISCERA: Limited low radiation dose noncontrast CT evaluation demonstrates no clinically significant abnormality of the imaged portions of the liver, spleen, pancreas, or adrenal glands.     GALLBLADDER/BILIARY TREE:  No calcified gallstones. No pericholecystic inflammatory change.  No biliary dilatation.     STOMACH AND BOWEL:  Unremarkable.     APPENDIX: A normal appendix was visualized.     ABDOMINOPELVIC CAVITY:   No ascites.  No pneumoperitoneum.  No lymphadenopathy.     VESSELS: Unremarkable for patient's age.     REPRODUCTIVE ORGANS:  Unremarkable for patient's age.     ABDOMINAL WALL/INGUINAL REGIONS: Small fat-containing periumbilical hernia.     OSSEOUS STRUCTURES:  No acute fracture or destructive osseous lesion. There is mild degenerative disc disease at L4-5 and L5-S1.     IMPRESSION:     No CT evidence of nephrolithiasis or obstructive uropathy.     PROCEDURE:     Recent Results (from the past 2 hour(s))   POCT urine dip    Collection Time: 01/16/24  9:48 AM   Result Value Ref Range    LEUKOCYTE ESTERASE,UA neg     NITRITE,UA neg     SL AMB POCT UROBILINOGEN 0.2     POCT URINE PROTEIN neg      PH,UA 5.0     BLOOD,UA neg     SPECIFIC GRAVITY,UA 1.025     KETONES,UA neg     BILIRUBIN,UA neg     GLUCOSE, UA neg      COLOR,UA yellow     CLARITY,UA clear    ]

## 2024-01-16 NOTE — LETTER
January 16, 2024     Martín Sherman DO  [address]  [address]  [place] [address]    Patient: Donald Collazo   YOB: 1990   Date of Visit: 1/16/2024       Dear Dr. Sherman:    Thank you for referring Donald Collazo to me for evaluation. Below are my notes for this consultation.    If you have questions, please do not hesitate to call me. I look forward to following your patient along with you.         Sincerely,        Rogerio Mcarthur MD        CC: KEVIN Vargas MD  1/16/2024 10:07 AM  Sign when Signing Visit    ASSESSMENT:     33 y.o. male  with testicular pain    PLAN:     It is unclear that the patient has had true bacterial infections or epididymal orchitis.  This would be less likely in his age range.  Prior STI testing is negative and the patient is in a stable relationship.  I discussed that moving forward, the patient should not be empirically treated with antibiotics without formal urine testing that demonstrates a bacterial infection.  Knows to contact our office when symptoms recur for urine drop-off.    I suspect this may be more of an inflammatory pain as the patient has multiple musculoskeletal complaints.  He is following with physical therapy with a plan to focus on his lower extremity complaints.  As such, I will not refer the patient to pelvic floor physical therapy at the current time but I am hopeful that the physical therapy team can evaluate lumbosacral spine and pelvic floor muscles as part of their workup.      Baseline scrotal ultrasound will be performed.  This is more to rule out any on recognized abnormalities.    I did offer the patient a trial of gabapentin 300 mg be taken at night to help reduce neuropathic pain.            ----          UROLOGY NEW CONSULT NOTE     CHIEF COMPLAINT   Donald Collazo is a 33 y.o. male with a complaint of   Chief Complaint   Patient presents with   • Epididymitis        History of Present Illness:   Donald Collazo is a 33 y.o. male here for evaluation of testicular pain.  Patient reports multiple episodes of epididymal orchitis.  Has had separate visits to the emergency room and urgent care in the past treatment with doxycycline.  Patient's fiancé is a nurse practitioner and has prescribed additional empiric antibiotics on several occasions.  Patient reports a dull throbbing pain in his lower back, abdomen and testicle on the left side.  He is also had right flank pain and musculoskeletal complaints.  He also reports some occasional difficulty with urination.  No blood in the urine or fevers or chills.    Urinary Score(s)         AUA SYMPTOM SCORE      Flowsheet Row Most Recent Value   AUA SYMPTOM SCORE    How often have you had a sensation of not emptying your bladder completely after you finished urinating? 0 (P)    How often have you had to urinate again less than two hours after you finished urinating? 0 (P)    How often have you found you stopped and started again several times when you urinate? 0 (P)    How often have you found it difficult to postpone urination? 0 (P)    How often have you had a weak urinary stream? 0 (P)    How often have you had to push or strain to begin urination? 0 (P)    How many times did you most typically get up to urinate from the time you went to bed at night until the time you got up in the morning? 0 (P)    Quality of Life: If you were to spend the rest of your life with your urinary condition just the way it is now, how would you feel about that? 0 (P)    AUA SYMPTOM SCORE 0 (P)                Past Medical History:     Past Medical History:   Diagnosis Date   • Migraine        PAST SURGICAL HISTORY:     Past Surgical History:   Procedure Laterality Date   • KNEE ARTHROSCOPY W/ ACL RECONSTRUCTION      has had it three times between two knees       CURRENT MEDICATIONS:     Current Outpatient Medications   Medication Sig Dispense  Refill   • gabapentin (Neurontin) 300 mg capsule Take 1 capsule (300 mg total) by mouth 3 (three) times a day 90 capsule 0   • meloxicam (Mobic) 15 mg tablet Take 1 tablet (15 mg total) by mouth daily 30 tablet 0   • Triamcinolone Acetonide 0.025 % LOTN Apply topically 2 (two) times a day Apply sparingly twice a day. Do not use more then 10 times a month 60 mL 3   • doxycycline hyclate (VIBRAMYCIN) 100 mg capsule TAKE 1 CAPSULE BY MOUTH TWICE A DAY FOR 10 DAYS (Patient not taking: Reported on 1/16/2024)     • Flowflex COVID-19 Ag Home Test KIT  (Patient not taking: Reported on 12/1/2023)     • meloxicam (MOBIC) 7.5 mg tablet TAKE 1 TABLET (7.5 MG TOTAL) BY MOUTH DAILY AS NEEDED FOR MODERATE PAIN (Patient not taking: Reported on 1/11/2024) 90 tablet 1     No current facility-administered medications for this visit.       ALLERGIES:     Allergies   Allergen Reactions   • Sulfa Antibiotics        SOCIAL HISTORY:     Social History     Socioeconomic History   • Marital status: Single     Spouse name: Not on file   • Number of children: Not on file   • Years of education: Not on file   • Highest education level: Not on file   Occupational History   • Not on file   Tobacco Use   • Smoking status: Never   • Smokeless tobacco: Never   Vaping Use   • Vaping status: Never Used   Substance and Sexual Activity   • Alcohol use: Yes     Comment: socially    • Drug use: Never   • Sexual activity: Not on file   Other Topics Concern   • Not on file   Social History Narrative    Consumes 4-5 cups of coffee per week     Social Determinants of Health     Financial Resource Strain: Not on file   Food Insecurity: Not on file   Transportation Needs: Not on file   Physical Activity: Not on file   Stress: Not on file   Social Connections: Not on file   Intimate Partner Violence: Not on file   Housing Stability: Not on file       SOCIAL HISTORY:     Family History   Problem Relation Age of Onset   • No Known Problems Mother    • Heart  "disease Father    • Hypertension Father    • Myasthenia gravis Father    • Asthma Sister    • Asthma Brother    • Hypertension Maternal Grandmother    • Seizures Maternal Grandfather    • Glaucoma Maternal Grandfather    • Hypertension Maternal Grandfather    • Leukemia Maternal Grandfather    • Migraines Paternal Grandmother    • Breast cancer Paternal Grandmother    • Hypertension Paternal Grandfather        REVIEW OF SYSTEMS:     Review of Systems   Constitutional:  Negative for chills and fever.   HENT:  Negative for ear pain and sore throat.    Eyes:  Negative for pain and visual disturbance.   Respiratory:  Negative for cough and shortness of breath.    Cardiovascular:  Negative for chest pain and palpitations.   Gastrointestinal:  Negative for abdominal pain and vomiting.   Genitourinary:  Positive for difficulty urinating and testicular pain. Negative for dysuria and hematuria.   Musculoskeletal:  Positive for back pain and myalgias. Negative for arthralgias.   Skin:  Negative for color change and rash.   Neurological:  Negative for seizures and syncope.   All other systems reviewed and are negative.        PHYSICAL EXAM:     /84 (BP Location: Left arm, Patient Position: Sitting, Cuff Size: Standard)   Pulse 96   Ht 5' 8\" (1.727 m)   Wt 101 kg (223 lb)   SpO2 97%   BMI 33.91 kg/m²     Physical Exam  Vitals reviewed.   Constitutional:       General: He is not in acute distress.     Appearance: He is well-developed.   HENT:      Head: Normocephalic and atraumatic.   Eyes:      Pupils: Pupils are equal, round, and reactive to light.   Cardiovascular:      Rate and Rhythm: Normal rate.   Pulmonary:      Effort: Pulmonary effort is normal. No respiratory distress.      Breath sounds: Normal breath sounds.   Abdominal:      General: There is no distension.      Palpations: Abdomen is soft.      Tenderness: There is no abdominal tenderness.   Genitourinary:     Penis: Normal.       Testes: Normal.      " Comments: No abnormalities on exam  Musculoskeletal:         General: Normal range of motion.      Cervical back: Normal range of motion and neck supple.   Skin:     General: Skin is warm and dry.   Neurological:      Mental Status: He is alert and oriented to person, place, and time.   Psychiatric:         Behavior: Behavior normal.         LABS:     CBC:   Lab Results   Component Value Date    WBC 8.95 10/11/2023    HGB 15.2 10/11/2023    HCT 44.1 10/11/2023    MCV 88 10/11/2023     10/11/2023       BMP:   Lab Results   Component Value Date    CALCIUM 9.3 10/11/2023    K 4.2 10/11/2023    CO2 24 10/11/2023     10/11/2023    BUN 16 10/11/2023    CREATININE 1.06 10/11/2023     1/16/23  5:06 PM    N gonorrhoeae, DNA Probe  Negative Negative   Chlamydia trachomatis, DNA Probe  Negative Negative       IMAGING:     10/5/23  Narrative & Impression   CT ABDOMEN AND PELVIS WITHOUT IV CONTRAST - LOW DOSE RENAL STONE     INDICATION:   Flank pain, kidney stone suspected  Right flank pain.        COMPARISON:  None.     TECHNIQUE:  Low radiation dose thin section CT examination of the abdomen and pelvis was performed without intravenous or oral contrast according to a protocol specifically designed to evaluate for urinary tract calculus.  Axial, sagittal, and coronal 2D   reformatted images were created from the source data and submitted for interpretation.  Evaluation for pathology in the abdomen and pelvis that is unrelated to urinary tract calculi is limited.     Radiation dose length product (DLP) for this visit:  492 mGy-cm .  This examination, like all CT scans performed in the [facility], was performed utilizing techniques to minimize radiation dose exposure, including the use of iterative   reconstruction and automated exposure control.     URINARY TRACT FINDINGS:     RIGHT KIDNEY AND URETER:  No urinary tract calculi.  No hydronephrosis or hydroureter.     LEFT KIDNEY AND URETER:  No  urinary tract calculi.  No hydronephrosis or hydroureter.     URINARY BLADDER:  Unremarkable.        ADDITIONAL FINDINGS:     LOWER CHEST:  No clinically significant abnormality identified in the visualized lower chest.     SOLID VISCERA: Limited low radiation dose noncontrast CT evaluation demonstrates no clinically significant abnormality of the imaged portions of the liver, spleen, pancreas, or adrenal glands.     GALLBLADDER/BILIARY TREE:  No calcified gallstones. No pericholecystic inflammatory change.  No biliary dilatation.     STOMACH AND BOWEL:  Unremarkable.     APPENDIX: A normal appendix was visualized.     ABDOMINOPELVIC CAVITY:  No ascites.  No pneumoperitoneum.  No lymphadenopathy.     VESSELS: Unremarkable for patient's age.     REPRODUCTIVE ORGANS:  Unremarkable for patient's age.     ABDOMINAL WALL/INGUINAL REGIONS: Small fat-containing periumbilical hernia.     OSSEOUS STRUCTURES:  No acute fracture or destructive osseous lesion. There is mild degenerative disc disease at L4-5 and L5-S1.     IMPRESSION:     No CT evidence of nephrolithiasis or obstructive uropathy.     PROCEDURE:     Recent Results (from the past 2 hour(s))   POCT urine dip    Collection Time: 01/16/24  9:48 AM   Result Value Ref Range    LEUKOCYTE ESTERASE,UA neg     NITRITE,UA neg     SL AMB POCT UROBILINOGEN 0.2     POCT URINE PROTEIN neg      PH,UA 5.0     BLOOD,UA neg     SPECIFIC GRAVITY,UA 1.025     KETONES,UA neg     BILIRUBIN,UA neg     GLUCOSE, UA neg      COLOR,UA yellow     CLARITY,UA clear    ]

## 2024-01-17 ENCOUNTER — OFFICE VISIT (OUTPATIENT)
Dept: OBGYN CLINIC | Facility: CLINIC | Age: 34
End: 2024-01-17

## 2024-01-17 ENCOUNTER — OFFICE VISIT (OUTPATIENT)
Dept: PHYSICAL THERAPY | Facility: MEDICAL CENTER | Age: 34
End: 2024-01-17
Payer: COMMERCIAL

## 2024-01-17 VITALS
DIASTOLIC BLOOD PRESSURE: 80 MMHG | HEIGHT: 68 IN | BODY MASS INDEX: 33.65 KG/M2 | SYSTOLIC BLOOD PRESSURE: 120 MMHG | WEIGHT: 222 LBS

## 2024-01-17 DIAGNOSIS — M77.12 LATERAL EPICONDYLITIS OF BOTH ELBOWS: Primary | ICD-10-CM

## 2024-01-17 DIAGNOSIS — M77.11 LATERAL EPICONDYLITIS OF BOTH ELBOWS: Primary | ICD-10-CM

## 2024-01-17 DIAGNOSIS — M25.50 POLYARTHRALGIA: Primary | ICD-10-CM

## 2024-01-17 DIAGNOSIS — M17.2 POST-TRAUMATIC OSTEOARTHRITIS OF BOTH KNEES: ICD-10-CM

## 2024-01-17 DIAGNOSIS — M77.8 TENDINITIS OF BOTH WRISTS: ICD-10-CM

## 2024-01-17 DIAGNOSIS — M77.12 LATERAL EPICONDYLITIS OF BOTH ELBOWS: ICD-10-CM

## 2024-01-17 DIAGNOSIS — M77.11 LATERAL EPICONDYLITIS OF BOTH ELBOWS: ICD-10-CM

## 2024-01-17 PROCEDURE — 97110 THERAPEUTIC EXERCISES: CPT | Performed by: PHYSICAL THERAPIST

## 2024-01-17 PROCEDURE — 97112 NEUROMUSCULAR REEDUCATION: CPT | Performed by: PHYSICAL THERAPIST

## 2024-01-17 PROCEDURE — 97140 MANUAL THERAPY 1/> REGIONS: CPT | Performed by: PHYSICAL THERAPIST

## 2024-01-17 RX ORDER — LIDOCAINE HYDROCHLORIDE 10 MG/ML
1 INJECTION, SOLUTION INFILTRATION; PERINEURAL
Status: COMPLETED | OUTPATIENT
Start: 2024-01-17 | End: 2024-01-17

## 2024-01-17 RX ORDER — MELOXICAM 15 MG/1
15 TABLET ORAL DAILY
Qty: 30 TABLET | Refills: 1 | Status: SHIPPED | OUTPATIENT
Start: 2024-01-17

## 2024-01-17 RX ORDER — BETAMETHASONE SODIUM PHOSPHATE AND BETAMETHASONE ACETATE 3; 3 MG/ML; MG/ML
6 INJECTION, SUSPENSION INTRA-ARTICULAR; INTRALESIONAL; INTRAMUSCULAR; SOFT TISSUE
Status: COMPLETED | OUTPATIENT
Start: 2024-01-17 | End: 2024-01-17

## 2024-01-17 RX ADMIN — LIDOCAINE HYDROCHLORIDE 1 ML: 10 INJECTION, SOLUTION INFILTRATION; PERINEURAL at 15:00

## 2024-01-17 RX ADMIN — BETAMETHASONE SODIUM PHOSPHATE AND BETAMETHASONE ACETATE 6 MG: 3; 3 INJECTION, SUSPENSION INTRA-ARTICULAR; INTRALESIONAL; INTRAMUSCULAR; SOFT TISSUE at 15:00

## 2024-01-17 NOTE — PROGRESS NOTES
"Patient Name:  Donald Collazo  MRN:  [id number]    Assessment & Plan     Bilateral lateral epicondylitis.  Bilateral wrist pain, likely FCR tendinitis.    Polyarthralgia.  Patient does note mild improvement in his upper extremity symptoms after initiating formal physical therapy.  Patient was offered and accepted bilateral corticosteroid injections into the common extensor origins of the elbows.  Continue physical therapy.  Continue meloxicam.  Activity as tolerated modification avoid pain.  Patient has multiple joint complaints at this time.  Autoimmune blood work will be obtained for further evaluation for this. Patient will be contacted with any abnormal results and appropriate referral to rheumatology will be placed at that point as indicated.  Follow-up in 6 weeks for repeat evaluation.    Chief Complaint     Follow-up bilateral upper extremity pain.    History of the Present Illness     Donald Collazo is a 33 y.o. male who returns to the office today for follow-up regarding his bilateral elbows and bilateral wrist.  He was initially seen on 12/8/2023.  At that time he was referred to physical therapy and advised to continue meloxicam.  He returns to the office today noting approximately 10% improvement in his symptoms since initiating physical therapy and taking meloxicam.  He still notes pain along the lateral aspect of the elbow with radiation distally into the forearm and wrist.  Pain is worse with repetitive use and lifting.  He denies any stiffness or swelling.  He denies any weakness or instability.  He denies any numbness and tingling currently.  He also notes occasional pain in the shoulders hips and great toes.  No fevers or chills.    Physical Exam     /80   Ht 5' 8\" (1.727 m)   Wt 101 kg (222 lb)   BMI 33.75 kg/m²     Right upper extremity.: Skin intact.  No gross deformity.  No erythema ecchymosis or swelling.  No thenar atrophy.  No first dorsal interosseous space " atrophy.  No significant tenderness over the distal radius, distal ulna, scaphoid, remainder of the carpus, digits, and first dorsal compartment and CMC joint of the thumb..  There is some tenderness over the FCR.  There is also tenderness over the lateral epicondyle of the elbow.  Full wrist flexion extension without pain.  Full composite fist formation.  Pain in the lateral elbow with resisted wrist extension.  No pain in the elbow with resisted wrist flexion.  Negative Tinel's over the cubital tunnel.  Negative Tinel's and Durkan's compression test about the carpal tunnel.  Sensation intact median ulnar and radial nerves.  2+ radial pulse.  Negative Finkelstein test.  Negative CMC grind test.     Left upper extremity: Skin is intact without erythema ecchymosis or swelling.  Kinesiotape in place. No gross deformity.  No thenar atrophy or first dorsal interosseous space atrophy.  No tenderness distal radius, distal ulna, scaphoid, remainder of the carpus, digits, first dorsal compartment, and CMC joint of the thumb.  There is some tenderness over the FCR.  There is also tenderness over the lateral epicondyle of the elbow.  Full wrist flexion extension without pain.  Full composite fist remission without pain as well.  Pain in the lateral elbow with resisted wrist extension.  No pain in the elbow with resisted wrist flexion.  Negative Tinel's over the cubital and carpal tunnel.  Negative Durkan's compression test about the carpal tunnel.  Sensation intact median ulnar and radial nerves.  2+ radial pulse.  Negative Finkelstein test.  Negative CMC grind test.    Eyes: Anicteric sclerae.  ENT: Trachea midline.  Lungs: Normal respiratory effort.  CV: Capillary refill is less than 2 seconds.  Skin: Intact without erythema.  Lymph: No palpable lymphadenopathy.  Neuro: Sensation is grossly intact to light touch.  Psych: Mood and affect are appropriate.    Past Medical History:   Diagnosis Date    Migraine        Past  Surgical History:   Procedure Laterality Date    KNEE ARTHROSCOPY W/ ACL RECONSTRUCTION      has had it three times between two knees       Allergies   Allergen Reactions    Sulfa Antibiotics        Current Outpatient Medications on File Prior to Visit   Medication Sig Dispense Refill    gabapentin (Neurontin) 300 mg capsule Take 1 capsule (300 mg total) by mouth 3 (three) times a day 90 capsule 0    Triamcinolone Acetonide 0.025 % LOTN Apply topically 2 (two) times a day Apply sparingly twice a day. Do not use more then 10 times a month 60 mL 3    [DISCONTINUED] meloxicam (Mobic) 15 mg tablet Take 1 tablet (15 mg total) by mouth daily 30 tablet 0    doxycycline hyclate (VIBRAMYCIN) 100 mg capsule TAKE 1 CAPSULE BY MOUTH TWICE A DAY FOR 10 DAYS (Patient not taking: Reported on 1/16/2024)      Flowflex COVID-19 Ag Home Test KIT  (Patient not taking: Reported on 12/1/2023)      meloxicam (MOBIC) 7.5 mg tablet TAKE 1 TABLET (7.5 MG TOTAL) BY MOUTH DAILY AS NEEDED FOR MODERATE PAIN (Patient not taking: Reported on 1/11/2024) 90 tablet 1     No current facility-administered medications on file prior to visit.       Social History     Tobacco Use    Smoking status: Never    Smokeless tobacco: Never   Vaping Use    Vaping status: Never Used   Substance Use Topics    Alcohol use: Yes     Comment: socially     Drug use: Never       Family History   Problem Relation Age of Onset    No Known Problems Mother     Heart disease Father     Hypertension Father     Myasthenia gravis Father     Asthma Sister     Asthma Brother     Hypertension Maternal Grandmother     Seizures Maternal Grandfather     Glaucoma Maternal Grandfather     Hypertension Maternal Grandfather     Leukemia Maternal Grandfather     Migraines Paternal Grandmother     Breast cancer Paternal Grandmother     Hypertension Paternal Grandfather        Review of Systems     As stated in the HPI. All other systems reviewed and are negative.      Hand/upper extremity  injection: bilateral elbow  Procedure Details  Condition:lateral epicondylitis Site: bilateral elbow   Needle size: 22 G  Ultrasound guidance: no  Approach: lateral  Medications (Right): 1 mL lidocaine 1 %; 6 mg betamethasone acetate-betamethasone sodium phosphate 6 (3-3) mg/mLPatient tolerance: patient tolerated the procedure well with no immediate complications  Dressing:  Sterile dressing applied

## 2024-01-17 NOTE — PROGRESS NOTES
Daily Note     Today's date: 2024  Patient name: Donald Collazo  : 1990  MRN: [id number]  Referring provider: Roberth Nowak P*  Dx:   Encounter Diagnosis     ICD-10-CM    1. Lateral epicondylitis of both elbows  M77.11     M77.12       2. Tendinitis of both wrists  M77.8                      Subjective: Patient states he had increased symptoms in both wrists and elbows after driving over the weekend. He has been restrictive in how much he does at home as to be cautious to not flare up in his symptoms.       Objective: See treatment diary below      Assessment:  Continued with manual therapy for soft tissue mobilization but used IASTM for deeper mobilization. He has no apparent tightness of the common wrist extensors but he does have tenderness at proximal and distal attachments bilaterally. We did continue with postural mobility and strengthening to reduce distal stresses. Added in eccentrics which were mildly uncomfortable but did improve with reps. He should continue with activity modifications with cell phone use and gripping/twisting motions to minimize stress at the elbow and thumb as repetitive motions like these are shown to contribute to chronic thumb/wrist/elbow tendon injuries. Patient would benefit from continued PT for postural strengthening and graded exercise for symptom management and improve tolerance to activity.       Plan: Continue PT POC.     Precautions: n/a    HEP: thoracic extension stretch, scap retractions, extensor compartment stretch  Manuals         L RH volar joint mobs nv Gr. III-IV Gr. III-IV Gr. III_IV np        Upper/mid t/s joint mobs nv Gr. III-IV Gr. III_IV np         Posterior capsule stretch (B) nv CK CK np         TFM EDC/ ECRB/L (L), APL/EPB (L) nv CK CK CK IASTM B CK        KT 1st dorsal compartment   CK CK CK        Neuro Re-Ed             Scap-4 nv No band x10 ea YTB x20 ea RTB x20 ea RTB x20 ea        Prone raises nv  "X10 ea 5\" x10 ea 5\" x10 ea X20 ea                                                                         Ther Ex             T/s extension stretch with foam roller nv 5\" X10-15 5\" x10-15 5\" x10 5\"x10        Supine SA punches  3\"x20 3\"x30 np         Sidelying ER (B) nv x20 X30 ea 3x15 ea 3x15 ea        Foam roller stretch at wall nv 10\" x10 10\" x10 10\" x10 10\" x10        SA walks at wall    YTB x5 rounds YTB x5 rounds        Flex bar eccentrics     Ylw x10 ea                                               Ther Activity                                       Gait Training                                       Modalities                                            "

## 2024-01-19 ENCOUNTER — APPOINTMENT (OUTPATIENT)
Dept: LAB | Facility: CLINIC | Age: 34
End: 2024-01-19
Payer: COMMERCIAL

## 2024-01-19 DIAGNOSIS — M25.50 POLYARTHRALGIA: ICD-10-CM

## 2024-01-19 LAB
CRP SERPL QL: 6.8 MG/L
ERYTHROCYTE [SEDIMENTATION RATE] IN BLOOD: 27 MM/HOUR (ref 0–14)
URATE SERPL-MCNC: 5.8 MG/DL (ref 3.5–8.5)

## 2024-01-19 PROCEDURE — 36415 COLL VENOUS BLD VENIPUNCTURE: CPT

## 2024-01-19 PROCEDURE — 84550 ASSAY OF BLOOD/URIC ACID: CPT

## 2024-01-19 PROCEDURE — 86038 ANTINUCLEAR ANTIBODIES: CPT

## 2024-01-19 PROCEDURE — 86140 C-REACTIVE PROTEIN: CPT

## 2024-01-19 PROCEDURE — 86618 LYME DISEASE ANTIBODY: CPT

## 2024-01-19 PROCEDURE — 86430 RHEUMATOID FACTOR TEST QUAL: CPT

## 2024-01-19 PROCEDURE — 81374 HLA I TYPING 1 ANTIGEN LR: CPT

## 2024-01-19 PROCEDURE — 86200 CCP ANTIBODY: CPT

## 2024-01-19 PROCEDURE — 85652 RBC SED RATE AUTOMATED: CPT

## 2024-01-20 LAB
ANA SER QL IA: NEGATIVE
B BURGDOR IGG+IGM SER QL IA: NEGATIVE

## 2024-01-21 LAB — RHEUMATOID FACT SER QL LA: NEGATIVE

## 2024-01-23 ENCOUNTER — OFFICE VISIT (OUTPATIENT)
Dept: PHYSICAL THERAPY | Facility: MEDICAL CENTER | Age: 34
End: 2024-01-23
Payer: COMMERCIAL

## 2024-01-23 DIAGNOSIS — M77.8 TENDINITIS OF BOTH WRISTS: ICD-10-CM

## 2024-01-23 DIAGNOSIS — M77.11 LATERAL EPICONDYLITIS OF BOTH ELBOWS: Primary | ICD-10-CM

## 2024-01-23 DIAGNOSIS — M77.12 LATERAL EPICONDYLITIS OF BOTH ELBOWS: Primary | ICD-10-CM

## 2024-01-23 LAB — CCP AB SER IA-ACNC: 1.2

## 2024-01-23 PROCEDURE — 97112 NEUROMUSCULAR REEDUCATION: CPT | Performed by: PHYSICAL THERAPIST

## 2024-01-23 PROCEDURE — 97110 THERAPEUTIC EXERCISES: CPT | Performed by: PHYSICAL THERAPIST

## 2024-01-23 PROCEDURE — 97140 MANUAL THERAPY 1/> REGIONS: CPT | Performed by: PHYSICAL THERAPIST

## 2024-01-23 NOTE — PROGRESS NOTES
"Daily Note     Today's date: 2024  Patient name: Donald Collazo  : 1990  MRN: [id number]  Referring provider: Roberth Nowak P*  Dx:   Encounter Diagnosis     ICD-10-CM    1. Lateral epicondylitis of both elbows  M77.11     M77.12       2. Tendinitis of both wrists  M77.8                      Subjective: Patient received bilateral elbow CSI last Wednesday. He states elbow pain is improved overall. He reports decreased pain in elbow and wrists over the weekends but yesterday after returning to work has had increased ulnar and dorsal wrist pain bilaterally. He states he notices these symptoms also with lifting.       Objective: See treatment diary below      Assessment:  IASTM for bilateral extensor compartments. He has no evidence of instability in either wrist. Worked on wrist proprioceptive training today. He was most challenged with frisbee in which he did have some discomfort on the ulnar side of the wrists bilaterally. He will continue to work on bands at home for proximal and postural strength. He will work on identifying specific triggering activities for ulnar wrist pain over the next few days. Patient would benefit from continued PT for postural strengthening and graded exercise for symptom management and improve tolerance to activity.       Plan: Continue PT POC.     Precautions: n/a    HEP: thoracic extension stretch, scap retractions, extensor compartment stretch  Manuals        L RH volar joint mobs nv Gr. III-IV Gr. III-IV Gr. III_IV np        Upper/mid t/s joint mobs nv Gr. III-IV Gr. III_IV np         Posterior capsule stretch (B) nv CK CK np         TFM EDC/ ECRB/L (L), APL/EPB (L) nv CK CK CK IASTM B CK IASTM B CK       KT 1st dorsal compartment   CK CK CK        Neuro Re-Ed             Scap-4 nv No band x10 ea YTB x20 ea RTB x20 ea RTB x20 ea HEP       Prone raises nv X10 ea 5\" x10 ea 5\" x10 ea X20 ea 3\" x20 ea       Flex bar cross for wrist " "stability       ylw x2' ea       Body blade flex/ext in pro      30\"x3 ea       [place]risbee CW/CCW      X2' ea                                 Ther Ex             T/s extension stretch with foam roller nv 5\" X10-15 5\" x10-15 5\" x10 5\"x10 5\"x10       Supine SA punches  3\"x20 3\"x30 np         Sidelying ER (B) nv x20 X30 ea 3x15 ea 3x15 ea HEP       Foam roller stretch at wall nv 10\" x10 10\" x10 10\" x10 10\" x10 HEP       SA walks at wall    YTB x5 rounds YTB x5 rounds YTB x5 rounds       Flex bar eccentrics     Ylw x10 ea Ylw x10 ea                                              Ther Activity                                       Gait Training                                       Modalities                                            "

## 2024-01-24 ENCOUNTER — EVALUATION (OUTPATIENT)
Dept: PHYSICAL THERAPY | Facility: MEDICAL CENTER | Age: 34
End: 2024-01-24
Payer: COMMERCIAL

## 2024-01-24 DIAGNOSIS — G89.29 CHRONIC PAIN OF BOTH KNEES: Primary | ICD-10-CM

## 2024-01-24 DIAGNOSIS — M54.50 LOW BACK PAIN, UNSPECIFIED BACK PAIN LATERALITY, UNSPECIFIED CHRONICITY, UNSPECIFIED WHETHER SCIATICA PRESENT: ICD-10-CM

## 2024-01-24 DIAGNOSIS — M25.562 CHRONIC PAIN OF BOTH KNEES: Primary | ICD-10-CM

## 2024-01-24 DIAGNOSIS — M25.561 CHRONIC PAIN OF BOTH KNEES: Primary | ICD-10-CM

## 2024-01-24 LAB — HLA-B27 QL NAA+PROBE: NEGATIVE

## 2024-01-24 PROCEDURE — 97140 MANUAL THERAPY 1/> REGIONS: CPT | Performed by: PHYSICAL THERAPIST

## 2024-01-24 PROCEDURE — 97164 PT RE-EVAL EST PLAN CARE: CPT | Performed by: PHYSICAL THERAPIST

## 2024-01-24 PROCEDURE — 97110 THERAPEUTIC EXERCISES: CPT | Performed by: PHYSICAL THERAPIST

## 2024-01-24 NOTE — PROGRESS NOTES
Evaluation    Today's date: 2024  Patient name: Donald Collazo  : 1990  MRN: [id number]  Referring provider: Roberth Nowak P*  Dx:   Encounter Diagnosis     ICD-10-CM    1. Chronic pain of both knees  M25.561     M25.562     G89.29       2. Low back pain, unspecified back pain laterality, unspecified chronicity, unspecified whether sciatica present  M54.50                      Subjective: Patient reports right knee and hip pain. Patient reports onset of symptoms in September. He denies any recent injuries but does have a history of bilateral ACL surgeries. He states his symptoms started shortly after increased physical activity at work. He reports medial knee pain with occasional groin and hamstring pain. He reports stiffness in his right hip. He denies any numbness/tingling. Patient reports morning stiffness and difficulty getting out of the car at times and after being sedentary for a period of time. He reports he will limp at work at times. He recently had blood work that was (-) for Lymes and RF, however he did have abnormally high C-reactive protein and sedimentation rate. He has been prescribed meloxicam but does not feel it has done much. Patient reports concerns for multiple joint aches and pains that started in the past few months and is not sure why he is having these symptoms. Patient's goals are to have better symptom management to improve active lifestyle.       Objective: See treatment diary below    Standing posture: increased lumbar lordosis, hip IR R > L, valgum positioning of bilateral knees, bilateral toe in    Lumbar AROM WNL all planes, discomfort in central low back at end range extension  Hip ROM WNL all planes  Knee ROM WNL all planes  Ankle ROM WNL al planes    MMT  Hip flexion R = 4/5, L = 5/5  Hip abduction R = 3+/5, L = 4/5  Hip adduction R = 4/5, L = 4/5  Hip ER R = 3+/5, L = 4/5  Hip extension R = 4-/5, L = 4+/5  Knee extension 5/5 bilaterally  Prone knee  flexion R = 4/5, L = 4+/5  Ankle DF/PF 5/5 bilaterally    Functional strength:  Squat: right knee mild valgus collapse    SL STS: poor concentric power to stand, poor dynamic valgus control during descent, contralateral hip drop    SL bridge: hip lag on right, WNL on left    Side plank: poor-fair trunk/pelvis control x5 seconds R/L    Lateral step down test: valgus collapse past midline, knee drops anterior past toes, contralateral hip drop    Special tests:  (-) KEO, STEPHY  (+) LLD    Assessment: Mr. Collazo is a 33 y.o. male who is currently being seen in physical therapy for bilateral UE pain. Patient presents today for evaluation for right hip/knee/ankle pain with concurrent low back pain. Due to onset of polyarthralgia, patient has has lab work with elevated C-Reactive protein and sedimentation rate, I did recommended he follow up on findings with his referring physician. Patient presents with primary movement impairment diagnosis of lumbopelvic motor control impairments with associated strength impairments of the core and proximal musculature of the lower quarter resulting in adverse stresses at the right knee and ankle limiting his tolerance to walking and difficulty with movement after sedentary period. Patient would benefit from outpatient physical therapy services to address the observed impairments to reduce symptoms and improve tolerance to activity to allow for more active lifestyle and overall improved quality of life. Prognosis is good given compliance with PT attendance and HEP performance. Please contact me with any questions. Thank you for the referral.     Impairment list:  Lumbopelvic motor control and impairments  Decreased dynamic valgus control  Decreased core and proximal strength  Decreased functional strength of the lower quarter    Goals:   Patient will be independent in individualized HEP comprehensive to the lower quarter.  Patient will improve strength to 5/5 of the lower  "quarter  Patient will demonstrate single leg bridge without right hip lag  Patient will be able to hold side plank for 20-30 seconds  Patient will be able to complete lateral step down without good dynamic valgus control.    Plan: 1x/week for 6 weeks in addition to 1x/week for previously established treatment plan regarding bilateral UE.  POC end date: 3/1/24     Precautions: n/a    HEP: thoracic extension stretch, scap retractions, extensor compartment stretch  Manuals 12/21 2/28 1/5 1/12 1/17 1/23 1/24      L RH volar joint mobs nv Gr. III-IV Gr. III-IV Gr. III_IV np        Upper/mid t/s joint mobs nv Gr. III-IV Gr. III_IV np         Posterior capsule stretch (B) nv CK CK np         TFM EDC/ ECRB/L (L), APL/EPB (L) nv CK CK CK IASTM B CK IASTM B CK       KT 1st dorsal compartment   CK CK CK                     LAD        R Gr.V                                             Neuro Re-Ed             Scap-4 nv No band x10 ea YTB x20 ea RTB x20 ea RTB x20 ea HEP       Prone raises nv X10 ea 5\" x10 ea 5\" x10 ea X20 ea 3\" x20 ea       Flex bar cross for wrist stability       ylw x2' ea       Body blade flex/ext in pro      30\"x3 ea       [place]bee CW/CCW      X2' ea                                 Ther Ex             T/s extension stretch with foam roller nv 5\" X10-15 5\" x10-15 5\" x10 5\"x10 5\"x10       Supine SA punches  3\"x20 3\"x30 np         Sidelying ER (B) nv x20 X30 ea 3x15 ea 3x15 ea HEP       Foam roller stretch at wall nv 10\" x10 10\" x10 10\" x10 10\" x10 HEP       SA walks at wall    YTB x5 rounds YTB x5 rounds YTB x5 rounds       Flex bar eccentrics     Ylw x10 ea Ylw x10 ea                    Bridge       x30      Clam shell       x20      Sidelying hip abd       x20      Single leg bridge             Bridge with pball curl             Step up to balance             Lateral step             Forward step down in decline             Runners lunge             Forward lunges on toes             SL RDL             SL " ABC's             SL rebounder on dynamic surface                                       Ther Activity                                       Gait Training                                       Modalities

## 2024-01-26 ENCOUNTER — TELEPHONE (OUTPATIENT)
Dept: OBGYN CLINIC | Facility: HOSPITAL | Age: 34
End: 2024-01-26

## 2024-01-26 ENCOUNTER — NURSE TRIAGE (OUTPATIENT)
Age: 34
End: 2024-01-26

## 2024-01-26 DIAGNOSIS — M25.50 POLYARTHRALGIA: Primary | ICD-10-CM

## 2024-01-26 NOTE — TELEPHONE ENCOUNTER
Spoke with patient on the phone and reviewed his lab work.  His lab work was normal with the exception of ESR and CRP which were elevated.  Explained to the patient that these are nonspecific markers of inflammation in the body.  Patient continues to experience polyarthralgia.  He states his father does have a history of myasthenia gravis.  Offered referral to rheumatology.  Patient wishes to pursue rheumatologic evaluation.  Referral was placed today.  Advised he contact the main number to schedule an appointment with rheumatology.  Patient understands and had no further questions.

## 2024-01-26 NOTE — TELEPHONE ENCOUNTER
Client called in for clarification, upon in office discussion of taking gabapentin, instructions to take 300mg at night, however the scripts reads 300mg TID. Client just wanted to make sure he should only take once at bed time.   Please advise. Client is holding off on taking until they hear back.  Please see [employer] message from 01/18/24:  Should I be taking this 300 mg pill once a day before bed for 90 days or 3 times a day for 30 days?       The prescription bottle says :      Gabapentin 300 mg capsule     Commonly known as: Neurontin     Take 1 capsule (300 mg total) by mouth 3 (three) times a day     I was under the impression I would only take it once a day, before bed from our conversation.      Thanks  Joe

## 2024-01-26 NOTE — TELEPHONE ENCOUNTER
Contacted Joe and made him aware he should take Gabapentin 300 mg just at night, not three times per day. Patient verbalized understanding.

## 2024-01-26 NOTE — TELEPHONE ENCOUNTER
Caller: Patient    Doctor: Eliu    Reason for call: Patient would like to discuss lab results.    Call back#: [phone]

## 2024-01-31 ENCOUNTER — OFFICE VISIT (OUTPATIENT)
Dept: PHYSICAL THERAPY | Facility: MEDICAL CENTER | Age: 34
End: 2024-01-31
Payer: COMMERCIAL

## 2024-01-31 DIAGNOSIS — G89.29 CHRONIC PAIN OF BOTH KNEES: ICD-10-CM

## 2024-01-31 DIAGNOSIS — M77.12 LATERAL EPICONDYLITIS OF BOTH ELBOWS: Primary | ICD-10-CM

## 2024-01-31 DIAGNOSIS — M25.562 CHRONIC PAIN OF BOTH KNEES: ICD-10-CM

## 2024-01-31 DIAGNOSIS — M77.8 TENDINITIS OF BOTH WRISTS: ICD-10-CM

## 2024-01-31 DIAGNOSIS — M54.50 LOW BACK PAIN, UNSPECIFIED BACK PAIN LATERALITY, UNSPECIFIED CHRONICITY, UNSPECIFIED WHETHER SCIATICA PRESENT: ICD-10-CM

## 2024-01-31 DIAGNOSIS — M77.11 LATERAL EPICONDYLITIS OF BOTH ELBOWS: Primary | ICD-10-CM

## 2024-01-31 DIAGNOSIS — M25.561 CHRONIC PAIN OF BOTH KNEES: ICD-10-CM

## 2024-01-31 PROCEDURE — 97140 MANUAL THERAPY 1/> REGIONS: CPT | Performed by: PHYSICAL THERAPIST

## 2024-01-31 PROCEDURE — 97110 THERAPEUTIC EXERCISES: CPT | Performed by: PHYSICAL THERAPIST

## 2024-01-31 NOTE — PROGRESS NOTES
Daily Note    Today's date: 2024  Patient name: Donald Collazo  : 1990  MRN: [id number]  Referring provider: Roberth Nowak P*  Dx:   Encounter Diagnosis     ICD-10-CM    1. Lateral epicondylitis of both elbows  M77.11     M77.12       2. Tendinitis of both wrists  M77.8       3. Chronic pain of both knees  M25.561     M25.562     G89.29       4. Low back pain, unspecified back pain laterality, unspecified chronicity, unspecified whether sciatica present  M54.50           Start Time: 1530  Stop Time: 1620  Total time in clinic (min): 50 minutes    Subjective: Patient states his is experiencing bilateral ulnar sided wrist pain, L > R. He reports pain with wrist deviation and pushing up to get off the floor if stretching. He reports less elbow pain but still had symptoms over the weekend after holding a pint glass.     Objective: See treatment diary below    (+) fovea sign bilaterally (mild), push up test bilaterally, piano key test on left  (-) ECU synergy test, ECU stretch/resisted tests    Assessment: Treatment session today focused on Upper quarter. Worked on wrist proprioceptive awareness and motor retraining today which was challenging for patient to control wrist motion without compensatory forearm and elbow motion. We had a lengthy discussion on healing timelines and how nutrition and sleep can affect healing throughout the body. I did recommend a lumbar support for sitting at his desk chair. He should minimize weightbearing through the wrists as able when pushing up.     Plan: Continue PT POC. Continue with Upper quarter and lower quarter at separate visits.   POC end date: 3/1/24     Precautions: n/a    HEP: thoracic extension stretch, scap retractions, extensor compartment stretch  Manuals      L RH volar joint mobs nv Gr. III-IV Gr. III-IV Gr. III_IV np        Upper/mid t/s joint mobs nv Gr. III-IV Gr. III_IV np         Posterior capsule  "stretch (B) nv CK CK np         TFM EDC/ ECRB/L (L), APL/EPB (L) nv CK CK CK IASTM B CK IASTM B CK  IASTM B CK     KT 1st dorsal compartment   CK CK CK                     LAD        R Gr.V                                             Neuro Re-Ed             Scap-4 nv No band x10 ea YTB x20 ea RTB x20 ea RTB x20 ea HEP       Prone raises nv X10 ea 5\" x10 ea 5\" x10 ea X20 ea 3\" x20 ea       Flex bar cross for wrist stability       ylw x2' ea  Ylw x2' ea     Body blade flex/ext in pro      30\"x3 ea       [place] CW/CCW      X2' ea  X2' ea     Weighted DTM        Red med ball x2'     Digiweb ball toss        x2'                  Ther Ex             T/s extension stretch with foam roller nv 5\" X10-15 5\" x10-15 5\" x10 5\"x10 5\"x10       Supine SA punches  3\"x20 3\"x30 np         Sidelying ER (B) nv x20 X30 ea 3x15 ea 3x15 ea HEP       Foam roller stretch at wall nv 10\" x10 10\" x10 10\" x10 10\" x10 HEP       SA walks at wall    YTB x5 rounds YTB x5 rounds YTB x5 rounds       Flex bar eccentrics     Ylw x10 ea Ylw x10 ea                    Bridge       x30      Clam shell       x20      Sidelying hip abd       x20      Single leg bridge             Bridge with pball curl             Step up to balance             Lateral step             Forward step down in decline             Runners lunge             Forward lunges on toes             SL RDL             SL ABC's             SL rebounder on dynamic surface                                       Ther Activity                                       Gait Training                                       Modalities                                              "

## 2024-02-01 ENCOUNTER — HOSPITAL ENCOUNTER (OUTPATIENT)
Dept: ULTRASOUND IMAGING | Facility: MEDICAL CENTER | Age: 34
Discharge: HOME | End: 2024-02-01
Payer: COMMERCIAL

## 2024-02-01 DIAGNOSIS — N45.1 EPIDIDYMITIS: ICD-10-CM

## 2024-02-01 PROCEDURE — 76870 US EXAM SCROTUM: CPT

## 2024-02-02 ENCOUNTER — OFFICE VISIT (OUTPATIENT)
Dept: PHYSICAL THERAPY | Facility: MEDICAL CENTER | Age: 34
End: 2024-02-02
Payer: COMMERCIAL

## 2024-02-02 DIAGNOSIS — M25.561 CHRONIC PAIN OF BOTH KNEES: Primary | ICD-10-CM

## 2024-02-02 DIAGNOSIS — G89.29 CHRONIC PAIN OF BOTH KNEES: Primary | ICD-10-CM

## 2024-02-02 DIAGNOSIS — M54.50 LOW BACK PAIN, UNSPECIFIED BACK PAIN LATERALITY, UNSPECIFIED CHRONICITY, UNSPECIFIED WHETHER SCIATICA PRESENT: ICD-10-CM

## 2024-02-02 DIAGNOSIS — M25.562 CHRONIC PAIN OF BOTH KNEES: Primary | ICD-10-CM

## 2024-02-02 PROCEDURE — 97110 THERAPEUTIC EXERCISES: CPT | Performed by: PHYSICAL THERAPIST

## 2024-02-02 NOTE — PROGRESS NOTES
"Daily Note    Today's date: 2024  Patient name: Donald Collazo  : 1990  MRN: [id number]  Referring provider: Roberth Nowak P*  Dx:   Encounter Diagnosis     ICD-10-CM    1. Chronic pain of both knees  M25.561     M25.562     G89.29       2. Low back pain, unspecified back pain laterality, unspecified chronicity, unspecified whether sciatica present  M54.50                      Subjective: Patient reports some discomfort at the posterior right knee around the distal hamstring.     Objective: See treatment diary below        Assessment: Treatment session today focused on lower quarter. He required cueing for lumbopelvic positioning and maintaining a neutral spine with strengthening. He was challenged with CKC stabilization and strengthening. Cueing was provided for dynamic valgus control which did improve. Patient reported muscular fatigue post exercise performance. Patient was educated in expected post-exercise soreness and recommended active movement to help with muscle recovery.     Plan: Continue PT POC. Continue with Upper quarter and lower quarter at separate visits.   POC end date: 3/1/24     Precautions: n/a    HEP: thoracic extension stretch, scap retractions, extensor compartment stretch  Manuals  2/2    L RH volar joint mobs nv Gr. III-IV Gr. III-IV Gr. III_IV np        Upper/mid t/s joint mobs nv Gr. III-IV Gr. III_IV np         Posterior capsule stretch (B) nv CK CK np         TFM EDC/ ECRB/L (L), APL/EPB (L) nv CK CK CK IASTM B CK IASTM B CK  IASTM B CK     KT 1st dorsal compartment   CK CK CK                     LAD        R Gr.V                                             Neuro Re-Ed             Scap-4 nv No band x10 ea YTB x20 ea RTB x20 ea RTB x20 ea HEP       Prone raises nv X10 ea 5\" x10 ea 5\" x10 ea X20 ea 3\" x20 ea       Flex bar cross for wrist stability       ylw x2' ea  Ylw x2' ea     Body blade flex/ext in pro      30\"x3 ea   " "    [place] CW/CCW      X2' ea  X2' ea     Weighted DTM        Red med ball x2'     Digiweb ball toss        x2'                  Ther Ex             T/s extension stretch with foam roller nv 5\" X10-15 5\" x10-15 5\" x10 5\"x10 5\"x10       Supine SA punches  3\"x20 3\"x30 np         Sidelying ER (B) nv x20 X30 ea 3x15 ea 3x15 ea HEP       Foam roller stretch at wall nv 10\" x10 10\" x10 10\" x10 10\" x10 HEP       SA walks at wall    YTB x5 rounds YTB x5 rounds YTB x5 rounds       Flex bar eccentrics     Ylw x10 ea Ylw x10 ea                    bike         x5'    Hamstring stretch long sit         20\"x4    Bridge       x30  2\"x30    Clam shell       x20  X20 ea    Sidelying hip abd       x20  X20 ea    Single leg bridge             Bridge with pball curl         3x5    Runners lunge         L2 2x10    Lateral step down         L1 2x10    Forward step down in decline         2x10    Step up to balance             Forward lunges on toes             SL RDL             SL ABC's             SL rebounder on dynamic surface                                       Ther Activity                                       Gait Training                                       Modalities                                              "

## 2024-02-06 ENCOUNTER — OFFICE VISIT (OUTPATIENT)
Dept: PHYSICAL THERAPY | Facility: MEDICAL CENTER | Age: 34
End: 2024-02-06
Payer: COMMERCIAL

## 2024-02-06 DIAGNOSIS — M77.11 LATERAL EPICONDYLITIS OF BOTH ELBOWS: Primary | ICD-10-CM

## 2024-02-06 DIAGNOSIS — M77.12 LATERAL EPICONDYLITIS OF BOTH ELBOWS: Primary | ICD-10-CM

## 2024-02-06 DIAGNOSIS — M77.8 TENDINITIS OF BOTH WRISTS: ICD-10-CM

## 2024-02-06 PROCEDURE — 97140 MANUAL THERAPY 1/> REGIONS: CPT | Performed by: PHYSICAL THERAPIST

## 2024-02-06 PROCEDURE — 97112 NEUROMUSCULAR REEDUCATION: CPT | Performed by: PHYSICAL THERAPIST

## 2024-02-06 NOTE — PROGRESS NOTES
Daily Note    Today's date: 2024  Patient name: Donald Collazo  : 1990  MRN: [id number]  Referring provider: Roberth Nowak P*  Dx:   Encounter Diagnosis     ICD-10-CM    1. Lateral epicondylitis of both elbows  M77.11     M77.12       2. Tendinitis of both wrists  M77.8                        Subjective: Patient reports elbow pain is improved bilaterally. He reports decreased left thumb pain but does notice it still with wide . He states most of his symptoms are isolated to the ulnar side of bilateral wrists that he notices with wrist deviations with  lifting.     Objective: See treatment diary below      Assessment: Treatment session today focused on upper quarter. Patient demonstrates improved motor control of bilateral wrists with decreased cueing provided during stabilization and proprioceptive training. He demonstrates good wrist positioning with progressing of bands for postural strengthening. He did have some elbow discomfort with robbery position so he was instructed to hold as to not aggravate the symptoms. Spent time educating patient on healing prognosis of ulnar sided wrist structures and how it can take more time due to poor blood supply to this area. He was instructed to continue with activity modifications even as he starts feeling better and noticing improvements.     Plan: Continue PT POC. Continue with Upper quarter and lower quarter at separate visits.   POC end date: 3/1/24     Precautions: n/a    HEP: thoracic extension stretch, scap retractions, extensor compartment stretch  Manuals  2/2 2/6     L RH volar joint mobs Gr. III-IV Gr. III_IV np          Upper/mid t/s joint mobs Gr. III_IV np           Posterior capsule stretch (B) CK np           TFM EDC/ ECRB/L (L), APL/EPB (L) CK CK IASTM B CK IASTM B CK  IASTM B CK  IASTM B CK     KT 1st dorsal compartment CK CK CK                       LAD      R Gr.V                                 "               Neuro Re-Ed             Scap-4 YTB x20 ea RTB x20 ea RTB x20 ea HEP    GTB x20 ea     Prone raises 5\" x10 ea 5\" x10 ea X20 ea 3\" x20 ea         Flex bar cross for wrist stability     ylw x2' ea  Ylw x2' ea  Ylw x3' ea     Body blade flex/ext in pro    30\"x3 ea    30\"x3 ea     [place] CW/CCW    X2' ea  X2' ea  X2' ea     Weighted DTM      Red med ball x2'  Red med bal x3'     Digiweb ball toss      x2'  x2'                  Ther Ex             T/s extension stretch with foam roller 5\" x10-15 5\" x10 5\"x10 5\"x10         Supine SA punches 3\"x30 np           Sidelying ER (B) X30 ea 3x15 ea 3x15 ea HEP         Foam roller stretch at wall 10\" x10 10\" x10 10\" x10 HEP         SA walks at wall  YTB x5 rounds YTB x5 rounds YTB x5 rounds         Flex bar eccentrics   Ylw x10 ea Ylw x10 ea                      bike       x5'      Hamstring stretch long sit       20\"x4      Bridge     x30  2\"x30      Clam shell     x20  X20 ea      Sidelying hip abd     x20  X20 ea      Single leg bridge             Bridge with pball curl       3x5      Runners lunge       L2 2x10      Lateral step down       L1 2x10      Forward step down in decline       2x10      Step up to balance             Forward lunges on toes             SL RDL             SL ABC's             SL rebounder on dynamic surface                                       Ther Activity                                       Gait Training                                       Modalities                                              "

## 2024-02-08 ENCOUNTER — OFFICE VISIT (OUTPATIENT)
Dept: PHYSICAL THERAPY | Facility: MEDICAL CENTER | Age: 34
End: 2024-02-08
Payer: COMMERCIAL

## 2024-02-08 DIAGNOSIS — M25.561 CHRONIC PAIN OF BOTH KNEES: Primary | ICD-10-CM

## 2024-02-08 DIAGNOSIS — M54.50 LOW BACK PAIN, UNSPECIFIED BACK PAIN LATERALITY, UNSPECIFIED CHRONICITY, UNSPECIFIED WHETHER SCIATICA PRESENT: ICD-10-CM

## 2024-02-08 DIAGNOSIS — G89.29 CHRONIC PAIN OF BOTH KNEES: Primary | ICD-10-CM

## 2024-02-08 DIAGNOSIS — M25.562 CHRONIC PAIN OF BOTH KNEES: Primary | ICD-10-CM

## 2024-02-08 PROCEDURE — 97112 NEUROMUSCULAR REEDUCATION: CPT | Performed by: PHYSICAL THERAPIST

## 2024-02-08 PROCEDURE — 97110 THERAPEUTIC EXERCISES: CPT | Performed by: PHYSICAL THERAPIST

## 2024-02-08 NOTE — PROGRESS NOTES
Daily Note    Today's date: 2024  Patient name: Donald Collazo  : 1990  MRN: [id number]  Referring provider: Roberth Nowak P*  Dx:   Encounter Diagnosis     ICD-10-CM    1. Chronic pain of both knees  M25.561     M25.562     G89.29       2. Low back pain, unspecified back pain laterality, unspecified chronicity, unspecified whether sciatica present  M54.50                        Subjective: Patient states reports generalized low back pain, hip,  and knee pain. He states his wrists are sore but denies any increased pain from previous treatment session.     Objective: See treatment diary below      Assessment: Patient presents with improved lumbopelvic motor control with sidelying hip strengthening.  He also presents with improved posterior chain activation with bridging activities.   Challenged with standing stabilization but demonstrated improved dynamic valgus control.  Progressed lumbopelvic stabilization into quadruped with emphasis  on neutral spine. This was more challenging for him in regards to weightbearing through his wrists but we did work on wrist proprioceptive training at the same time. He was challenged with maintaining a neutral spine so we did use mirror feedback to assist with positioning.  Patient fatigued post treatment session. Patient will benefit from continued PT to address lumbopelvic motor control and strength impairments.     Plan: Continue PT POC. Continue with Upper quarter and lower quarter at separate visits.   POC end date: 3/1/24     Precautions: n/a    HEP: thoracic extension stretch, scap retractions, extensor compartment stretch  Manuals  2/2 2/6 2/8    L RH volar joint mobs Gr. III-IV Gr. III_IV np          Upper/mid t/s joint mobs Gr. III_IV np           Posterior capsule stretch (B) CK np           TFM EDC/ ECRB/L (L), APL/EPB (L) CK CK IASTM B CK IASTM B CK  IASTM B CK  IASTM B CK     KT 1st dorsal compartment CK CK CK     "                   LAD      R Gr.V    R Gr. V                                           Neuro Re-Ed             Scap-4 YTB x20 ea RTB x20 ea RTB x20 ea HEP    GTB x20 ea     Prone raises 5\" x10 ea 5\" x10 ea X20 ea 3\" x20 ea         Flex bar cross for wrist stability     ylw x2' ea  Ylw x2' ea  Ylw x3' ea     Body blade flex/ext in pro    30\"x3 ea    30\"x3 ea     [place] CW/CCW    X2' ea  X2' ea  X2' ea     Weighted DTM      Red med ball x2'  Red med bal x3'     Digiweb ball toss      x2'  x2'                                            Quadruped bird dog             Quadruped fire hydrant         2x10                              Ther Ex             T/s extension stretch with foam roller 5\" x10-15 5\" x10 5\"x10 5\"x10         Supine SA punches 3\"x30 np           Sidelying ER (B) X30 ea 3x15 ea 3x15 ea HEP         Foam roller stretch at wall 10\" x10 10\" x10 10\" x10 HEP         SA walks at wall  YTB x5 rounds YTB x5 rounds YTB x5 rounds         Flex bar eccentrics   Ylw x10 ea Ylw x10 ea                      bike       x5'  x5'    Hamstring stretch long sit       20\"x4      Bridge     x30  2\"x30  3\"x30    Clam shell     x20  X20 ea  X30 ea    Sidelying hip abd     x20  X20 ea  X30 ea    Single leg bridge         3x5 ea    Bridge with pball curl       3x5  3x5    Runners lunge       L2 2x10  L2 2x10    Lateral step down       L1 2x10  L1 2x10    Forward step down in decline       2x10  2x10    Step up to balance             Forward lunges on toes             SL RDL             SL ABC's             SL rebounder on dynamic surface                                       Ther Activity                                       Gait Training                                       Modalities                                              "

## 2024-02-14 ENCOUNTER — OFFICE VISIT (OUTPATIENT)
Dept: PHYSICAL THERAPY | Facility: MEDICAL CENTER | Age: 34
End: 2024-02-14
Payer: COMMERCIAL

## 2024-02-14 DIAGNOSIS — M77.11 LATERAL EPICONDYLITIS OF BOTH ELBOWS: Primary | ICD-10-CM

## 2024-02-14 DIAGNOSIS — M77.8 TENDINITIS OF BOTH WRISTS: ICD-10-CM

## 2024-02-14 DIAGNOSIS — M77.12 LATERAL EPICONDYLITIS OF BOTH ELBOWS: Primary | ICD-10-CM

## 2024-02-14 PROCEDURE — 97140 MANUAL THERAPY 1/> REGIONS: CPT | Performed by: PHYSICAL THERAPIST

## 2024-02-14 PROCEDURE — 97112 NEUROMUSCULAR REEDUCATION: CPT | Performed by: PHYSICAL THERAPIST

## 2024-02-14 PROCEDURE — 97110 THERAPEUTIC EXERCISES: CPT | Performed by: PHYSICAL THERAPIST

## 2024-02-14 NOTE — PROGRESS NOTES
"Daily Note    Today's date: 2024  Patient name: Dnoald Collazo  : 1990  MRN: [id number]  Referring provider: Roberth Nowak P*  Dx:   Encounter Diagnosis     ICD-10-CM    1. Lateral epicondylitis of both elbows  M77.11     M77.12       2. Tendinitis of both wrists  M77.8                        Subjective: Patient reports ongoing bilateral ulnar sided wrist pain. Has a difficult time identifying aggravating factors and states the symptoms do jump around between the wrists and the elbows. He does notice the symptoms after a extended periods of time at his desk but not necessarily always during desk work.     Objective: See treatment diary below      Assessment: Ulnar sided wrist pain reproduced with ulnar nerve glides. He has pain on the right with cervical rotation and lateral flexion. Hypomobility and pain with right cervical UPA. With assessment of postural during desk set up patient started to slump after < 1 minute of simulated work. Added in cervical retractions with extension prescribed at 10 reps 6x/day to see if this changes his symptoms in addition  to PNGs. Continued to work on endurance of postural stabilizers to improve tolerance to desk work.     Plan: Monitor response nv.   POC end date: 3/1/24     Precautions: n/a    HEP: cervical retraction and extension, Ulnar nerve glides  Manuals  2/2 2/6 2/8 14   L RH volar joint mobs Gr. III-IV Gr. III_IV np          Upper/mid t/s joint mobs Gr. III_IV np        C/s L UPA Gr. III   Posterior capsule stretch (B) CK np           TFM EDC/ ECRB/L (L), APL/EPB (L) CK CK IASTM B CK IASTM B CK  IASTM B CK  IASTM B CK     KT 1st dorsal compartment CK CK CK                       LAD      R Gr.V    R Gr. V                                           Neuro Re-Ed             Scap-4 YTB x20 ea RTB x20 ea RTB x20 ea HEP    GTB x20 ea  GTB x30 ea   Prone raises 5\" x10 ea 5\" x10 ea X20 ea 3\" x20 ea         Flex bar cross " "for wrist stability     ylw x2' ea  Ylw x2' ea  Ylw x3' ea     Body blade flex/ext in pro    30\"x3 ea    30\"x3 ea  30\"x3 ea   [place] CW/CCW    X2' ea  X2' ea  X2' ea     Weighted DTM      Red med ball x2'  Red med bal x3'     Digiweb ball toss      x2'  x2'                                            Quadruped bird dog             Quadruped fire hydrant         2x10                              Ther Ex             T/s extension stretch with foam roller 5\" x10-15 5\" x10 5\"x10 5\"x10      5\"x10   Supine SA punches 3\"x30 np           Sidelying ER (B) X30 ea 3x15 ea 3x15 ea HEP         Foam roller stretch at wall 10\" x10 10\" x10 10\" x10 HEP         SA walks at wall  YTB x5 rounds YTB x5 rounds YTB x5 rounds         Flex bar eccentrics   Ylw x10 ea Ylw x10 ea                      bike       x5'  x5'    Hamstring stretch long sit       20\"x4      Bridge     x30  2\"x30  3\"x30    Clam shell     x20  X20 ea  X30 ea    Sidelying hip abd     x20  X20 ea  X30 ea    Single leg bridge         3x5 ea    Bridge with pball curl       3x5  3x5    Runners lunge       L2 2x10  L2 2x10    Lateral step down       L1 2x10  L1 2x10    Forward step down in decline       2x10  2x10    Step up to balance             Forward lunges on toes             SL RDL             SL ABC's             SL rebounder on dynamic surface                                       Ther Activity                                       Gait Training                                       Modalities                                              "

## 2024-02-16 ENCOUNTER — NURSE TRIAGE (OUTPATIENT)
Age: 34
End: 2024-02-16

## 2024-02-16 ENCOUNTER — APPOINTMENT (OUTPATIENT)
Dept: PHYSICAL THERAPY | Facility: MEDICAL CENTER | Age: 34
End: 2024-02-16
Payer: COMMERCIAL

## 2024-02-16 ENCOUNTER — HOSPITAL ENCOUNTER (EMERGENCY)
Facility: HOSPITAL | Age: 34
Discharge: HOME | End: 2024-02-16
Attending: EMERGENCY MEDICINE
Payer: COMMERCIAL

## 2024-02-16 VITALS
RESPIRATION RATE: 19 BRPM | WEIGHT: 217.81 LBS | TEMPERATURE: 98 F | DIASTOLIC BLOOD PRESSURE: 92 MMHG | OXYGEN SATURATION: 97 % | HEART RATE: 104 BPM | BODY MASS INDEX: 33.12 KG/M2 | SYSTOLIC BLOOD PRESSURE: 170 MMHG

## 2024-02-16 DIAGNOSIS — N50.812 LEFT TESTICULAR PAIN: Primary | ICD-10-CM

## 2024-02-16 DIAGNOSIS — M62.89 PELVIC FLOOR DYSFUNCTION: Primary | ICD-10-CM

## 2024-02-16 LAB
BILIRUB UR QL STRIP: NEGATIVE
CLARITY UR: CLEAR
COLOR UR: COLORLESS
GLUCOSE UR STRIP-MCNC: NEGATIVE MG/DL
HGB UR QL STRIP.AUTO: NEGATIVE
KETONES UR STRIP-MCNC: NEGATIVE MG/DL
LEUKOCYTE ESTERASE UR QL STRIP: NEGATIVE
NITRITE UR QL STRIP: NEGATIVE
PH UR STRIP.AUTO: 5 [PH]
PROT UR STRIP-MCNC: NEGATIVE MG/DL
SP GR UR STRIP.AUTO: 1.01 (ref 1–1.03)
UROBILINOGEN UR STRIP-ACNC: <2 MG/DL

## 2024-02-16 PROCEDURE — 87591 N.GONORRHOEAE DNA AMP PROB: CPT

## 2024-02-16 PROCEDURE — 99284 EMERGENCY DEPT VISIT MOD MDM: CPT

## 2024-02-16 PROCEDURE — 87491 CHLMYD TRACH DNA AMP PROBE: CPT

## 2024-02-16 PROCEDURE — 81003 URINALYSIS AUTO W/O SCOPE: CPT

## 2024-02-16 RX ORDER — NAPROXEN 250 MG/1
500 TABLET ORAL ONCE
Status: COMPLETED | OUTPATIENT
Start: 2024-02-16 | End: 2024-02-16

## 2024-02-16 RX ORDER — DOXYCYCLINE HYCLATE 100 MG/1
100 CAPSULE ORAL ONCE
Status: COMPLETED | OUTPATIENT
Start: 2024-02-16 | End: 2024-02-16

## 2024-02-16 RX ORDER — NAPROXEN 500 MG/1
500 TABLET ORAL 2 TIMES DAILY WITH MEALS
Qty: 20 TABLET | Refills: 0 | Status: SHIPPED | OUTPATIENT
Start: 2024-02-16 | End: 2024-02-26

## 2024-02-16 RX ORDER — DOXYCYCLINE HYCLATE 100 MG/1
100 CAPSULE ORAL 2 TIMES DAILY
Qty: 20 CAPSULE | Refills: 0 | Status: SHIPPED | OUTPATIENT
Start: 2024-02-16 | End: 2024-02-26

## 2024-02-16 RX ADMIN — NAPROXEN 500 MG: 250 TABLET ORAL at 03:18

## 2024-02-16 RX ADMIN — DOXYCYCLINE 100 MG: 100 CAPSULE ORAL at 03:18

## 2024-02-16 NOTE — DISCHARGE INSTRUCTIONS
Today I provided prescription for doxycycline, naproxen.  Take as directed for left testicular pain.  Follow-up with urology for continued monitoring of left testicular pain.  Please do return to ED for new or worsening symptoms.

## 2024-02-16 NOTE — ED PROVIDER NOTES
History  Chief Complaint   Patient presents with    Groin Swelling     Patient states left testicle swelling, frequent urination, patient states hx of epididymis, sees a urologist and wanted patient to come to ed       33-year-old male with history of epididymitis presents to ED for evaluation of left testicle pain, increased urinary frequency.  Patient states that the symptoms started this past evening around 10 PM.  Patient previously took doxycycline for epididymitis with success.  His urologist advised him to come to ED to have urinary testing performed prior to starting any doxycycline.  Patient denies any fevers, chills, nausea, vomiting, abdominal pain, numbness and tingling of extremities, seizure, syncope.  Patient noticed that his urine was darker than usual this past evening.  Denies any new partners.  Denies any penile drainage or discharge.         Prior to Admission Medications   Prescriptions Last Dose Informant Patient Reported? Taking?   Flowflex COVID-19 Ag Home Test KIT   Yes No   Patient not taking: Reported on 12/1/2023   Triamcinolone Acetonide 0.025 % LOTN   No No   Sig: Apply topically 2 (two) times a day Apply sparingly twice a day. Do not use more then 10 times a month   doxycycline hyclate (VIBRAMYCIN) 100 mg capsule   Yes No   Sig: TAKE 1 CAPSULE BY MOUTH TWICE A DAY FOR 10 DAYS   Patient not taking: Reported on 1/16/2024   gabapentin (Neurontin) 300 mg capsule   No Yes   Sig: Take 1 capsule (300 mg total) by mouth 3 (three) times a day   meloxicam (MOBIC) 7.5 mg tablet   No No   Sig: TAKE 1 TABLET (7.5 MG TOTAL) BY MOUTH DAILY AS NEEDED FOR MODERATE PAIN   Patient not taking: Reported on 1/11/2024   meloxicam (Mobic) 15 mg tablet   No No   Sig: Take 1 tablet (15 mg total) by mouth daily      Facility-Administered Medications: None       Past Medical History:   Diagnosis Date    Migraine        Past Surgical History:   Procedure Laterality Date    KNEE ARTHROSCOPY W/ ACL RECONSTRUCTION       has had it three times between two knees       Family History   Problem Relation Age of Onset    No Known Problems Mother     Heart disease Father     Hypertension Father     Myasthenia gravis Father     Asthma Sister     Asthma Brother     Hypertension Maternal Grandmother     Seizures Maternal Grandfather     Glaucoma Maternal Grandfather     Hypertension Maternal Grandfather     Leukemia Maternal Grandfather     Migraines Paternal Grandmother     Breast cancer Paternal Grandmother     Hypertension Paternal Grandfather      I have reviewed and agree with the history as documented.    E-Cigarette/Vaping    E-Cigarette Use Never User      E-Cigarette/Vaping Substances    Nicotine No     THC No     CBD No     Flavoring No     Other No     Unknown No      Social History     Tobacco Use    Smoking status: Never    Smokeless tobacco: Never   Vaping Use    Vaping status: Never Used   Substance Use Topics    Alcohol use: Yes     Comment: socially     Drug use: Never       Review of Systems   Constitutional:  Negative for chills and fever.   HENT:  Negative for ear pain and sore throat.    Eyes:  Negative for pain and visual disturbance.   Respiratory:  Negative for cough and shortness of breath.    Cardiovascular:  Negative for chest pain and palpitations.   Gastrointestinal:  Negative for abdominal pain and vomiting.   Genitourinary:  Positive for frequency and testicular pain. Negative for dysuria, flank pain, genital sores, hematuria, penile discharge, penile pain, penile swelling and scrotal swelling.   Musculoskeletal:  Negative for arthralgias and back pain.   Skin:  Negative for color change and rash.   Neurological:  Negative for seizures and syncope.   All other systems reviewed and are negative.      Physical Exam  Physical Exam  Vitals and nursing note reviewed. Exam conducted with a chaperone present.   Constitutional:       General: He is not in acute distress.     Appearance: Normal appearance. He is  well-developed. He is not ill-appearing, toxic-appearing or diaphoretic.   HENT:      Head: Normocephalic and atraumatic.   Eyes:      Conjunctiva/sclera: Conjunctivae normal.   Cardiovascular:      Rate and Rhythm: Normal rate and regular rhythm.      Pulses: Normal pulses.      Heart sounds: Normal heart sounds. No murmur heard.     No friction rub. No gallop.   Pulmonary:      Effort: Pulmonary effort is normal. No respiratory distress.      Breath sounds: Normal breath sounds. No stridor. No wheezing, rhonchi or rales.   Abdominal:      Palpations: Abdomen is soft.      Tenderness: There is no abdominal tenderness.      Hernia: There is no hernia in the left inguinal area or right inguinal area.   Genitourinary:     Pubic Area: No rash.       Penis: No phimosis, paraphimosis, hypospadias, erythema, tenderness, discharge, swelling or lesions.       Testes: Cremasteric reflex is present.         Right: Mass, swelling, testicular hydrocele or varicocele not present. Right testis is descended. Cremasteric reflex is present.          Left: Mass, swelling, testicular hydrocele or varicocele not present. Left testis is descended. Cremasteric reflex is present.       Epididymis:      Right: Normal.      Left: Not enlarged. Tenderness present.       Musculoskeletal:      Cervical back: Neck supple.   Lymphadenopathy:      Lower Body: No right inguinal adenopathy. No left inguinal adenopathy.   Skin:     General: Skin is warm and dry.      Capillary Refill: Capillary refill takes less than 2 seconds.   Neurological:      Mental Status: He is alert.   Psychiatric:         Mood and Affect: Mood normal.         Vital Signs  ED Triage Vitals [02/16/24 0116]   Temperature Pulse Respirations Blood Pressure SpO2   98 °F (36.7 °C) 104 19 170/92 97 %      Temp Source Heart Rate Source Patient Position - Orthostatic VS BP Location FiO2 (%)   Oral Monitor Sitting Right arm --      Pain Score       4           Vitals:    02/16/24  0116   BP: 170/92   Pulse: 104   Patient Position - Orthostatic VS: Sitting         Visual Acuity      ED Medications  Medications   doxycycline hyclate (VIBRAMYCIN) capsule 100 mg (100 mg Oral Given 2/16/24 0318)   naproxen (NAPROSYN) tablet 500 mg (500 mg Oral Given 2/16/24 0318)       Diagnostic Studies  Results Reviewed       Procedure Component Value Units Date/Time    Chlamydia/GC amplified DNA by PCR [[id number]]  (Normal) Collected: 02/16/24 0216    Lab Status: Final result Specimen: Urine, Other Updated: 02/17/24 1054     N gonorrhoeae, DNA Probe Negative     Chlamydia trachomatis, DNA Probe Negative    Narrative:      This test was performed using the FDA-approved Raysa 6800 CT/NG assay ([employer]). This test uses real-time PCR to detect Chlamydia trachomatis (CT) and Neisseria gonorrhoeae (NG). This instrument and assay have been validated by the  and performing laboratory and verified by the performing laboratory.  This test is intended as an aid in the diagnosis of chlamydial and gonococcal disease. This test has not been evaluated in patients younger than 14 years of age and is not recommended for evaluation of suspected sexual abuse. This assay is not intended to replace other exams or tests for diagnosis of urogenital infection by causative factors other than Chalmydia trachomatis (CT) and Neisseria gonorrhoeae (NG). Additional testing is recommended when the results do not correlate with clinical signs and symptoms.   Procedural Limitations  This assay has only been validated for use with male and female urine, clinician-instructed self-collected vaginal swab specimens, clinician-collected vaginal swab specimens, endocervical swab specimens collected in raysa® PCR Media and cervical specimens collected in PreservCyt® Solution. Assay performance has not been validated for use with other collection media and/or specimen types.   Detection of C. trachomatis and N. gonorrhoeaea is  dependent on the number of organisms present in the specimen. Detection may be affected by specimen collection methods, patient factors, stage of infection, infecting strains, and presence of polymerase/PCR inhibitors.   When CT is present at very high concentrations, the detection of NG present at concentrations near the limit of detection may be impacted.  The presence of mucus in endocervical specimens may lead to false negative results.  The presence of whole blood in urine and cervical specimens collected in PreservCyt Solution may lead to false negative and/or invalid test results.   Urine testing is recommended to be performed on first catch urine samples. The effects of other collection variables have not been evaluated at this time. The effects of vaginal discharge, tampon use, douching, and other collection variables have not been evaluated at this time.   This assay has not been evaluated with patients currently being treated with antimicrobial agents active against CT or NG, or patients with a history of hysterectomy.     UA w Reflex to Microscopic w Reflex to Culture [[id number]] Collected: 02/16/24 0216    Lab Status: Final result Specimen: Urine, Clean Catch Updated: 02/16/24 0226     Color, UA Colorless     Clarity, UA Clear     Specific Gravity, UA 1.009     pH, UA 5.0     Leukocytes, UA Negative     Nitrite, UA Negative     Protein, UA Negative mg/dl      Glucose, UA Negative mg/dl      Ketones, UA Negative mg/dl      Urobilinogen, UA <2.0 mg/dl      Bilirubin, UA Negative     Occult Blood, UA Negative                   No orders to display              Procedures  Procedures         ED Course                               SBIRT 20yo+      Flowsheet Row Most Recent Value   Initial Alcohol Screen: US AUDIT-C     1. How often do you have a drink containing alcohol? 0 Filed at: 02/16/2024 0207   2. How many drinks containing alcohol do you have on a typical day you are drinking?  0 Filed at: 02/16/2024  0207   3a. Male UNDER 65: How often do you have five or more drinks on one occasion? 0 Filed at: 02/16/2024 0207   Audit-C Score 0 Filed at: 02/16/2024 0207   LUCIANA: How many times in the past year have you...    Used an illegal drug or used a prescription medication for non-medical reasons? Never Filed at: 02/16/2024 0207                      Medical Decision Making  33-year-old male presents to ED for evaluation of left testicular pain as above.  Previously with epididymitis.  Previously treated with doxycycline for epididymitis.  Patient began developing testicle pain, dysuria this evening.  Was advised by urologist to come to ED for testing.  On physical examination patient is alert and responsive to questions.  He is right-handed.  Normotensive.  Heart rate of 94 bpm.  Normal breath sounds.  No murmur.  Performed examination of patient's testicles.  Mildly tender to palpation in the left epididymal region.  No swelling noted bilaterally.  No penile drainage.  No penile lesions.  Exam inconsistent with testicular torsion.  Obtained UA which returned without evidence of UTI.  Will obtain chlamydia/gonorrhea test which will take time to result.  Discussed options with patient.  Patient would like to receive prescription for doxycycline, naproxen which was effective last time he had epididymitis.  Patient has good follow-up with urology.  Feel this is reasonable.  First dose of doxycycline and naproxen given in ED.  Advised to follow-up with urology.  Sent patient prescription for naproxen and doxycycline.  Advised return to ED for new or worsening symptoms.    Prior to discharge, discharge instructions were discussed with patient at bedside. Patient was provided both verbal and written instructions. Patient is understanding of the discharge instructions and is agreeable to plan of care. Return precautions were discussed with patient bedside, patient verbalized understanding of signs and symptoms that would  necessitate return to the ED. All questions were answered. Patient was comfortable with the plan of care and discharged to home.     Amount and/or Complexity of Data Reviewed  Labs: ordered.    Risk  Prescription drug management.             Disposition  Final diagnoses:   Left testicular pain     Time reflects when diagnosis was documented in both MDM as applicable and the Disposition within this note       Time User Action Codes Description Comment    2/16/2024  3:14 AM James Awan Add [N50.812] Left testicular pain           ED Disposition       ED Disposition   Discharge    Condition   Stable    Date/Time   Fri Feb 16, 2024 0314    Comment   Donald Collazo discharge to home/self care.                   Follow-up Information       Follow up With Specialties Details Why Contact Info    Martín Sherman DO Family Medicine   [address]  [address]  [place] [address]  [phone]              Discharge Medication List as of 2/16/2024  3:16 AM        START taking these medications    Details   !! doxycycline hyclate (VIBRAMYCIN) 100 mg capsule Take 1 capsule (100 mg total) by mouth 2 (two) times a day for 10 days, Starting Fri 2/16/2024, Until Mon 2/26/2024, Normal      naproxen (Naprosyn) 500 mg tablet Take 1 tablet (500 mg total) by mouth 2 (two) times a day with meals for 10 days, Starting Fri 2/16/2024, Until Mon 2/26/2024, Normal       !! - Potential duplicate medications found. Please discuss with provider.        CONTINUE these medications which have NOT CHANGED    Details   gabapentin (Neurontin) 300 mg capsule Take 1 capsule (300 mg total) by mouth 3 (three) times a day, Starting Tue 1/16/2024, Normal      !! doxycycline hyclate (VIBRAMYCIN) 100 mg capsule TAKE 1 CAPSULE BY MOUTH TWICE A DAY FOR 10 DAYS, Historical Med      Flowflex COVID-19 Ag Home Test KIT Starting Tue 1/3/2023, Historical Med      !! meloxicam (Mobic) 15 mg tablet Take 1 tablet (15 mg total) by mouth daily, Starting  Wed 1/17/2024, Normal      !! meloxicam (MOBIC) 7.5 mg tablet TAKE 1 TABLET (7.5 MG TOTAL) BY MOUTH DAILY AS NEEDED FOR MODERATE PAIN, Starting Tue 12/26/2023, Normal      Triamcinolone Acetonide 0.025 % LOTN Apply topically 2 (two) times a day Apply sparingly twice a day. Do not use more then 10 times a month, Starting u 1/11/2024, Normal       !! - Potential duplicate medications found. Please discuss with provider.          No discharge procedures on file.    PDMP Review       None            ED Provider  Electronically Signed by             James Awan PA-C  02/18/24 0000

## 2024-02-16 NOTE — TELEPHONE ENCOUNTER
Pt of Dr. Mcarthur in [place]. Pt last seen on 01/16/24 for epididymitis    Pt called reports went to ER late last night due to pain and discomfort. Reports had urine testing done and given antibiotic and Naproxen. Pt reports still having a lot of pain and discomfort and unsure what else to do at this point. Patient did start medication but no relief. Please advise   Reason for Disposition  • Nursing judgment    Protocols used: Information Only Call - No Triage-ADULT-[place]

## 2024-02-17 LAB
C TRACH DNA SPEC QL NAA+PROBE: NEGATIVE
N GONORRHOEA DNA SPEC QL NAA+PROBE: NEGATIVE

## 2024-02-21 ENCOUNTER — OFFICE VISIT (OUTPATIENT)
Dept: PHYSICAL THERAPY | Facility: MEDICAL CENTER | Age: 34
End: 2024-02-21
Payer: COMMERCIAL

## 2024-02-21 DIAGNOSIS — M77.12 LATERAL EPICONDYLITIS OF BOTH ELBOWS: Primary | ICD-10-CM

## 2024-02-21 DIAGNOSIS — M25.562 CHRONIC PAIN OF BOTH KNEES: ICD-10-CM

## 2024-02-21 DIAGNOSIS — M77.11 LATERAL EPICONDYLITIS OF BOTH ELBOWS: Primary | ICD-10-CM

## 2024-02-21 DIAGNOSIS — M25.561 CHRONIC PAIN OF BOTH KNEES: ICD-10-CM

## 2024-02-21 DIAGNOSIS — M77.8 TENDINITIS OF BOTH WRISTS: ICD-10-CM

## 2024-02-21 DIAGNOSIS — G89.29 CHRONIC PAIN OF BOTH KNEES: ICD-10-CM

## 2024-02-21 DIAGNOSIS — M54.50 LOW BACK PAIN, UNSPECIFIED BACK PAIN LATERALITY, UNSPECIFIED CHRONICITY, UNSPECIFIED WHETHER SCIATICA PRESENT: ICD-10-CM

## 2024-02-21 PROCEDURE — 97140 MANUAL THERAPY 1/> REGIONS: CPT | Performed by: PHYSICAL THERAPIST

## 2024-02-21 PROCEDURE — 97110 THERAPEUTIC EXERCISES: CPT | Performed by: PHYSICAL THERAPIST

## 2024-02-21 PROCEDURE — 97112 NEUROMUSCULAR REEDUCATION: CPT | Performed by: PHYSICAL THERAPIST

## 2024-02-21 NOTE — PROGRESS NOTES
Daily Note    Today's date: 2024  Patient name: Donald Collazo  : 1990  MRN: [id number]  Referring provider: Roberth Nowak P*  Dx:   Encounter Diagnosis     ICD-10-CM    1. Lateral epicondylitis of both elbows  M77.11     M77.12       2. Tendinitis of both wrists  M77.8       3. Chronic pain of both knees  M25.561     M25.562     G89.29       4. Low back pain, unspecified back pain laterality, unspecified chronicity, unspecified whether sciatica present  M54.50                        Subjective: Patient reports ongoing ulnar sided wrist pain R> L. Has a difficult time identifying aggravating factors but based on positions he demonstrates it seems his symptoms are provoked with maximal pronation and UD motions. He states symptoms are improving in the left thumb and elbow but states he will continue to experience symptoms periodically. He reports decreased neck pain and increased mobility after last visit but no effect on distal symptoms. He does follow up with ortho next Wednesday.     Objective: See treatment diary below      Assessment: Right ulnar sided wrist pain reproduced with ulnar nerve glides, maximal pronation, and maximal wrist ulnar deviation. I am suspecting possible ulnar impingement of the right wrist based on x-rays and comparing to the contralateral side and minimal benefit from PT in improving right ulnar wrist pain. We did work on proprioceptive awareness and training of bilateral wrists in NWB and WB positions. WB was more provocative but able to be controlled. He should continue with PNG's and endurance of postural stabilizers to improve tolerance to desk work. Patient is currently being seen for multiple body regions and we are alternating sessions based on patient's preference for that day.     Plan: Monitor response nv.   POC end date: 3/1/24     Precautions: n/a    HEP: cervical retraction and extension, Ulnar nerve glides  Manuals   "2/14 2/21   L RH volar joint mobs np           Upper/mid t/s joint mobs        C/s L UPA Gr. III C/S L UPA Gr. III   Posterior capsule stretch (B)            TFM EDC/ ECRB/L (L), APL/EPB (L) IASTM B CK IASTM B CK  IASTM B CK  IASTM B CK      KT 1st dorsal compartment CK                       LAD    R Gr.V    R Gr. V                                         Neuro Re-Ed            Scap-4 RTB x20 ea HEP    GTB x20 ea  GTB x30 ea GTB x30 ea   Prone raises X20 ea 3\" x20 ea          Flex bar cross for wrist stability   ylw x2' ea  Ylw x2' ea  Ylw x3' ea      Body blade flex/ext in pro  30\"x3 ea    30\"x3 ea  30\"x3 ea Plate rhythmic stab 30\"x3 ea   Wrist proprioception RD to neutral         X30 on R   [place]bee CW/CCW  X2' ea  X2' ea  X2' ea   X2' ea ball on wall   Weighted DTM    Red med ball x2'  Red med bal x3'      Digiweb ball toss    x2'  x2'                                          Quadruped bird dog            Quadruped fire hydrant       2x10                             Ther Ex            T/s extension stretch with foam roller 5\"x10 5\"x10      5\"x10 5\" x10 ea   Supine SA punches            Sidelying ER (B) 3x15 ea HEP          Foam roller stretch at wall 10\" x10 HEP          SA walks at wall YTB x5 rounds YTB x5 rounds          Flex bar eccentrics Ylw x10 ea Ylw x10 ea                      bike     x5'  x5'     Hamstring stretch long sit     20\"x4       Bridge   x30  2\"x30  3\"x30     Clam shell   x20  X20 ea  X30 ea     Sidelying hip abd   x20  X20 ea  X30 ea     Single leg bridge       3x5 ea     Bridge with pball curl     3x5  3x5     Runners lunge     L2 2x10  L2 2x10     Lateral step down     L1 2x10  L1 2x10     Forward step down in decline     2x10  2x10     Step up to balance            Forward lunges on toes            SL RDL            SL ABC's            SL rebounder on dynamic surface                                    Ther Activity                                    Gait Training                            "         Modalities

## 2024-02-23 ENCOUNTER — OFFICE VISIT (OUTPATIENT)
Dept: PHYSICAL THERAPY | Facility: MEDICAL CENTER | Age: 34
End: 2024-02-23
Payer: COMMERCIAL

## 2024-02-23 DIAGNOSIS — M25.562 CHRONIC PAIN OF BOTH KNEES: ICD-10-CM

## 2024-02-23 DIAGNOSIS — G89.29 CHRONIC PAIN OF BOTH KNEES: ICD-10-CM

## 2024-02-23 DIAGNOSIS — M25.561 CHRONIC PAIN OF BOTH KNEES: ICD-10-CM

## 2024-02-23 DIAGNOSIS — M54.50 LOW BACK PAIN, UNSPECIFIED BACK PAIN LATERALITY, UNSPECIFIED CHRONICITY, UNSPECIFIED WHETHER SCIATICA PRESENT: Primary | ICD-10-CM

## 2024-02-23 PROCEDURE — 97112 NEUROMUSCULAR REEDUCATION: CPT | Performed by: PHYSICAL THERAPIST

## 2024-02-23 PROCEDURE — 97110 THERAPEUTIC EXERCISES: CPT | Performed by: PHYSICAL THERAPIST

## 2024-02-23 NOTE — PROGRESS NOTES
"Daily Note    Today's date: 2024  Patient name: Donald Collazo  : 1990  MRN: [id number]  Referring provider: Roberth Nowak P*  Dx:   Encounter Diagnosis     ICD-10-CM    1. Low back pain, unspecified back pain laterality, unspecified chronicity, unspecified whether sciatica present  M54.50       2. Chronic pain of both knees  M25.561     M25.562     G89.29                        Subjective: Patient states he still wakes up with some stiffness in this hips and knees first thing in the morning. Overall, is starting to notice some positive changes with physical therapy.     Objective: See treatment diary below      Assessment: Patient is improving in dynamic stability. He does continue to require cueing for hip and knee positioning at times but is able to maintain corrected form after cueing. I did provide him with a home program to work on isolated hip/glute/core strength. He would benefit from continued PT for graded exercise and stabilization and to maximize independence with HEP.     Plan: Monitor response nv. Progress treatment as tolerated.   POC end date: 3/1/24     Precautions: n/a    HEP: cervical retraction and extension, Ulnar nerve glides  Manuals  2/ 2/   L RH volar joint mobs            Upper/mid t/s joint mobs       C/s L UPA Gr. III C/S L UPA Gr. III    Posterior capsule stretch (B)            TFM EDC/ ECRB/L (L), APL/EPB (L) IASTM B CK  IASTM B CK  IASTM B CK       KT 1st dorsal compartment                        LAD   R Gr.V    R Gr. V                                          Neuro Re-Ed            Scap-4 HEP    GTB x20 ea  GTB x30 ea GTB x30 ea    Prone raises 3\" x20 ea           Flex bar cross for wrist stability  ylw x2' ea  Ylw x2' ea  Ylw x3' ea       Body blade flex/ext in pro 30\"x3 ea    30\"x3 ea  30\"x3 ea Plate rhythmic stab 30\"x3 ea    Wrist proprioception RD to neutral        X30 on R    [place]bee CW/CCW X2' ea  X2' ea  X2' ea   " "X2' ea ball on wall    Weighted DTM   Red med ball x2'  Red med bal x3'       Digiweb ball toss   x2'  x2'                                           Quadruped bird dog            Quadruped fire hydrant      2x10                              Ther Ex            T/s extension stretch with foam roller 5\"x10      5\"x10 5\" x10 ea    Supine SA punches            Sidelying ER (B) HEP           Foam roller stretch at wall HEP           SA walks at wall YTB x5 rounds           Flex bar eccentrics Ylw x10 ea                       bike    x5'  x5'   x5'   Lateral band walks/monster walks         Red x3 laps ea   Hamstring stretch long sit    20\"x4        Bridge  x30  2\"x30  3\"x30   HEP   Clam shell  x20  X20 ea  X30 ea   HEP   Sidelying hip abd  x20  X20 ea  X30 ea   HEP   Single leg bridge      3x5 ea   3x10 ea   Bridge with pball curl    3x5  3x5   3x8   Runners lunge    L2 2x10  L2 2x10      Lateral step down    L1 2x10  L1 2x10   L2 2x10   Forward step down in decline    2x10  2x10   3x10   Step up to balance         8\" 2x10   Lunge pulses on toes         2x8   SL RDL         2x10   SL rebounder on dynamic surface                                    Ther Activity                                    Gait Training                                    Modalities                                           "

## 2024-02-28 ENCOUNTER — OFFICE VISIT (OUTPATIENT)
Dept: PHYSICAL THERAPY | Facility: MEDICAL CENTER | Age: 34
End: 2024-02-28
Payer: COMMERCIAL

## 2024-02-28 ENCOUNTER — OFFICE VISIT (OUTPATIENT)
Dept: OBGYN CLINIC | Facility: CLINIC | Age: 34
End: 2024-02-28
Payer: COMMERCIAL

## 2024-02-28 VITALS
BODY MASS INDEX: 32.89 KG/M2 | WEIGHT: 217 LBS | SYSTOLIC BLOOD PRESSURE: 140 MMHG | DIASTOLIC BLOOD PRESSURE: 70 MMHG | HEIGHT: 68 IN

## 2024-02-28 DIAGNOSIS — M77.8 TENDONITIS OF BOTH WRISTS: ICD-10-CM

## 2024-02-28 DIAGNOSIS — M77.8 TENDINITIS OF BOTH WRISTS: ICD-10-CM

## 2024-02-28 DIAGNOSIS — M77.12 LATERAL EPICONDYLITIS OF BOTH ELBOWS: Primary | ICD-10-CM

## 2024-02-28 DIAGNOSIS — M23.91 INTERNAL DERANGEMENT OF RIGHT KNEE: ICD-10-CM

## 2024-02-28 DIAGNOSIS — Z98.890 S/P ACL RECONSTRUCTION: ICD-10-CM

## 2024-02-28 DIAGNOSIS — M77.11 LATERAL EPICONDYLITIS OF BOTH ELBOWS: Primary | ICD-10-CM

## 2024-02-28 PROCEDURE — 99213 OFFICE O/P EST LOW 20 MIN: CPT | Performed by: PHYSICIAN ASSISTANT

## 2024-02-28 PROCEDURE — 97112 NEUROMUSCULAR REEDUCATION: CPT | Performed by: PHYSICAL THERAPIST

## 2024-02-28 PROCEDURE — 97140 MANUAL THERAPY 1/> REGIONS: CPT | Performed by: PHYSICAL THERAPIST

## 2024-02-28 RX ORDER — MELOXICAM 15 MG/1
15 TABLET ORAL DAILY
Qty: 30 TABLET | Refills: 0 | Status: SHIPPED | OUTPATIENT
Start: 2024-02-28

## 2024-02-28 NOTE — PROGRESS NOTES
Patient Name:  Donald Collazo  MRN:  [id number]    Assessment & Plan     Improving bilateral elbow lateral epicondylitis.  Bilateral wrist pain, likely ECU tendinitis.  Right knee pain with history of ACL reconstruction greater than 10 years ago.  Possible posttraumatic arthritis versus medial meniscus tear.  Patient does exhibit tenderness over his ECU tendons of the bilateral wrists.  There is no evidence of ECU tendon subluxation at this time.  Recommend continued conservative management with physical therapy.  New referral placed for physical therapy to evaluate and treat the bilateral wrists as well as the bilateral elbows.  Universal wrist braces provided today for comfort as well.  Refill provided of meloxicam today at patient's request.  Patient also reports persistent right knee pain despite conservative management including 6 weeks of physical therapy.  He does have a history of ACL reconstruction greater than 10 years ago.  He does exhibit some signs of possible medial meniscus tear versus posttraumatic arthritis.  Due to persistent pain despite conservative management MRI of the right knee will be obtained for further evaluation.  Follow-up in 6 weeks for repeat evaluation.  Discussed possible ECU tendon sheath corticosteroid injections at that time as indicated.  Also discussed referral to one of our hand specialist.  Will discuss MRI of the knee at that time as well.    Chief Complaint     Follow-up bilateral upper extremities, right knee pain. Right Knee Pain    History of the Present Illness     Donald Collazo is a 33 y.o. male who returns to the office today for follow-up regarding her bilateral upper extremities.  He was last seen on 1/17/2024.  At that time he received bilateral elbow common extensor origin corticosteroid injections.  He notes significant improvement after undergoing the injections.  He continues to participate in physical therapy as well.  Overall he notes  "approximately 50% improvement in his symptoms of his elbows.  He now notes an onset of pain in his bilateral wrist that began approximate 1 month ago.  He states the pain is localized to the ulnar aspects of the wrists and worse with repetitive use and certain movements.  He denies any significant swelling stiffness or weakness in the wrists.  He denies any significant numbness and tingling.  He currently takes nothing for pain.  He was previously taking meloxicam for his pain.    He also notes persistent right knee pain.  He does have a history of ACL reconstruction performed greater than 10 years ago.  He has been participating in formal therapy for the right knee with limited improvement.  He notes persistent pain localized to the medial aspect of the knee.  Pain is worse with increased activity.  He notes intermittent swelling.  He notes mild stiffness as well.  He denies any significant weakness or instability.  No numbness or tingling in the right lower extremity.  No fevers or chills.    Physical Exam     /70   Ht 5' 8\" (1.727 m)   Wt 98.4 kg (217 lb)   BMI 32.99 kg/m²     Bilateral upper extremities: Numbers deformity.  Skin intact.  No erythema ecchymosis or swelling.  No significant tenderness lateral epicondyles bilaterally.  Full elbow range of motion bilaterally without pain.  Elbows are stable to varus and valgus stress without pain.  No significant pain in the lateral elbows with resisted wrist extension.  No pain in the elbows with resisted wrist flexion.  There is tenderness along the ECU tendons bilaterally.  No tenderness distal radius and distal ulna bilaterally.  Full wrist flexion and extension with slight discomfort along the ulnar aspect of the wrist with wrist extension.  No evidence of ECU tendon subluxation with wrist range of motion.  Full composite fist formation without pain.  Sensation intact median ulnar and radial nerves.  2+ radial pulse.    Knee: No gross deformity.  Skin " intact.  No erythema ecchymosis or swelling.  No knee effusion.  There is some tenderness along the medial joint line.  No lateral joint line tenderness.  Full range of motion without pain.  Stable Lachman test with firm endpoint.  Negative posterior drawer test.  Stable to varus and valgus stress without pain.  Negative Camarillo's test.  Extensor mechanism is intact.  Sensation is intact distally.    Eyes: Anicteric sclerae.  ENT: Trachea midline.  Lungs: Normal respiratory effort.  CV: Capillary refill is less than 2 seconds.  Skin: Intact without erythema.  Lymph: No palpable lymphadenopathy.  Neuro: Sensation is grossly intact to light touch.  Psych: Mood and affect are appropriate.      Past Medical History:   Diagnosis Date    Migraine        Past Surgical History:   Procedure Laterality Date    KNEE ARTHROSCOPY W/ ACL RECONSTRUCTION      has had it three times between two knees       Allergies   Allergen Reactions    Sulfa Antibiotics        Current Outpatient Medications on File Prior to Visit   Medication Sig Dispense Refill    doxycycline hyclate (VIBRAMYCIN) 100 mg capsule TAKE 1 CAPSULE BY MOUTH TWICE A DAY FOR 10 DAYS (Patient not taking: Reported on 1/16/2024)      Flowflex COVID-19 Ag Home Test KIT  (Patient not taking: Reported on 12/1/2023)      gabapentin (Neurontin) 300 mg capsule Take 1 capsule (300 mg total) by mouth 3 (three) times a day 90 capsule 0    meloxicam (Mobic) 15 mg tablet Take 1 tablet (15 mg total) by mouth daily 30 tablet 1    meloxicam (MOBIC) 7.5 mg tablet TAKE 1 TABLET (7.5 MG TOTAL) BY MOUTH DAILY AS NEEDED FOR MODERATE PAIN (Patient not taking: Reported on 1/11/2024) 90 tablet 1    naproxen (Naprosyn) 500 mg tablet Take 1 tablet (500 mg total) by mouth 2 (two) times a day with meals for 10 days 20 tablet 0    Triamcinolone Acetonide 0.025 % LOTN Apply topically 2 (two) times a day Apply sparingly twice a day. Do not use more then 10 times a month 60 mL 3     No current  facility-administered medications on file prior to visit.       Social History     Tobacco Use    Smoking status: Never    Smokeless tobacco: Never   Vaping Use    Vaping status: Never Used   Substance Use Topics    Alcohol use: Yes     Comment: socially     Drug use: Never       Family History   Problem Relation Age of Onset    No Known Problems Mother     Heart disease Father     Hypertension Father     Myasthenia gravis Father     Asthma Sister     Asthma Brother     Hypertension Maternal Grandmother     Seizures Maternal Grandfather     Glaucoma Maternal Grandfather     Hypertension Maternal Grandfather     Leukemia Maternal Grandfather     Migraines Paternal Grandmother     Breast cancer Paternal Grandmother     Hypertension Paternal Grandfather        Review of Systems     As stated in the HPI. All other systems reviewed and are negative.

## 2024-02-28 NOTE — PROGRESS NOTES
Daily Note    Today's date: 2024  Patient name: Donald Collazo  : 1990  MRN: [id number]  Referring provider: Roberth Nowak P*  Dx:   Encounter Diagnosis     ICD-10-CM    1. Lateral epicondylitis of both elbows  M77.11     M77.12       2. Tendinitis of both wrists  M77.8                          Subjective: Patient reports he is starting see some more improvements in bilateral elbow and wrist pain. He is more aware of his positioning with repetitive activity at work and daily activities. He states symptoms are isolated to ulnar sides of the wrists and provoked with deviation. He did receive bilateral wrist cock up splints at his follow up yesterday to wear as needed.     Objective: See treatment diary below      Assessment: Patient  has tenderness along bilateral ECU tendon sheaths and L APL/EPB tendon sheaths. Addressed with TFM across the tendon sheaths with positive response. Continued with dynamic wrist stabilization with occasional cueing required for technique. He is improving in proprioceptive awareness in bilateral wrists. Held on robbery motion with scap-4 due to elbow compression.  He would benefit from continued PT for graded exercise and stabilization and to maximize independence with HEP.     Plan: Continue PT  6 more weeks per updated orders from physician assistant.      Precautions: n/a    HEP: cervical retraction and extension, Ulnar nerve glides  Manuals  2/   L RH volar joint mobs            Upper/mid t/s joint mobs      C/s L UPA Gr. III C/S L UPA Gr. III     Posterior capsule stretch (B)            TFM EDC/ ECRB/L (L), APL/EPB (L)  IASTM B CK  IASTM B CK     TFM ECU B, L APL/EPB x15'   KT 1st dorsal compartment                        LAD  R Gr.V    R Gr. V                                           Neuro Re-Ed            Scap-4     GTB x20 ea  GTB x30 ea GTB x30 ea  GTB x30 ea no robbery   Prone raises            Flex bar cross  "for wrist stability   Ylw x2' ea  Ylw x3' ea        Body blade flex/ext in pro    30\"x3 ea  30\"x3 ea Plate rhythmic stab 30\"x3 ea  Plate rhythmic stab 30\"x3   Wrist proprioception RD to neutral       X30 on R  RTB x30 ea   [place] CW/CCW  X2' ea  X2' ea   X2' ea ball on wall  ABC's x2 ea on wall   Weighted DTM  Red med ball x2'  Red med bal x3'        Digiweb ball toss  x2'  x2'     x2'                                       Quadruped bird dog            Quadruped fire hydrant     2x10                               Ther Ex            T/s extension stretch with foam roller      5\"x10 5\" x10 ea     Supine SA punches            Sidelying ER (B)            Foam roller stretch at wall            SA walks at wall            Flex bar eccentrics                        bike   x5'  x5'   x5'    Lateral band walks/monster walks        Red x3 laps ea    Hamstring stretch long sit   20\"x4         Bridge x30  2\"x30  3\"x30   HEP    Clam shell x20  X20 ea  X30 ea   HEP    Sidelying hip abd x20  X20 ea  X30 ea   HEP    Single leg bridge     3x5 ea   3x10 ea    Bridge with pball curl   3x5  3x5   3x8    Runners lunge   L2 2x10  L2 2x10       Lateral step down   L1 2x10  L1 2x10   L2 2x10    Forward step down in decline   2x10  2x10   3x10    Step up to balance        8\" 2x10    Lunge pulses on toes        2x8    SL RDL        2x10    SL rebounder on dynamic surface                                    Ther Activity                                    Gait Training                                    Modalities                                           "

## 2024-03-01 ENCOUNTER — APPOINTMENT (OUTPATIENT)
Dept: PHYSICAL THERAPY | Facility: MEDICAL CENTER | Age: 34
End: 2024-03-01
Payer: COMMERCIAL

## 2024-03-07 ENCOUNTER — TELEPHONE (OUTPATIENT)
Age: 34
End: 2024-03-07

## 2024-03-07 ENCOUNTER — HOSPITAL ENCOUNTER (OUTPATIENT)
Facility: MEDICAL CENTER | Age: 34
Discharge: HOME | End: 2024-03-07
Payer: COMMERCIAL

## 2024-03-07 DIAGNOSIS — Z98.890 S/P ACL RECONSTRUCTION: ICD-10-CM

## 2024-03-07 DIAGNOSIS — M23.91 INTERNAL DERANGEMENT OF RIGHT KNEE: ICD-10-CM

## 2024-03-07 DIAGNOSIS — M23.92 INTERNAL DERANGEMENT OF LEFT KNEE: Primary | ICD-10-CM

## 2024-03-07 PROCEDURE — 73721 MRI JNT OF LWR EXTRE W/O DYE: CPT

## 2024-03-07 NOTE — TELEPHONE ENCOUNTER
Caller: patient    Doctor: lupe     Reason for call: would like to know if he can get mri done on both knees today, order is for R knee only would like L knee referral for today at 1:15 MRI appointment     Call back#: [phone]

## 2024-03-07 NOTE — TELEPHONE ENCOUNTER
Called and spoke w/pt and relayed RICHIE Nowak's msg and gave contact number for CS to schedule left knee MRI. He thanked us for the quick response.

## 2024-03-07 NOTE — TELEPHONE ENCOUNTER
Unfortunately they are unable to MRI both knees at once. These would have to be done separately. I did order the MRI of the left knee which can be scheduled an be performed at a later date.

## 2024-03-13 ENCOUNTER — OFFICE VISIT (OUTPATIENT)
Dept: PHYSICAL THERAPY | Facility: MEDICAL CENTER | Age: 34
End: 2024-03-13
Payer: COMMERCIAL

## 2024-03-13 DIAGNOSIS — M77.11 LATERAL EPICONDYLITIS OF BOTH ELBOWS: Primary | ICD-10-CM

## 2024-03-13 DIAGNOSIS — M77.12 LATERAL EPICONDYLITIS OF BOTH ELBOWS: Primary | ICD-10-CM

## 2024-03-13 DIAGNOSIS — M77.8 TENDINITIS OF BOTH WRISTS: ICD-10-CM

## 2024-03-13 PROCEDURE — 97140 MANUAL THERAPY 1/> REGIONS: CPT | Performed by: PHYSICAL THERAPIST

## 2024-03-13 PROCEDURE — 97112 NEUROMUSCULAR REEDUCATION: CPT | Performed by: PHYSICAL THERAPIST

## 2024-03-13 NOTE — PROGRESS NOTES
"Daily Note    Today's date: 3/13/2024  Patient name: Donald Collazo  : 1990  MRN: [id number]  Referring provider: Roberth Nowak P*  Dx:   Encounter Diagnosis     ICD-10-CM    1. Lateral epicondylitis of both elbows  M77.11     M77.12       2. 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 He states symptoms have improved in the past week. He states he notices his symptoms are more noticeable at rest and are not bothering him as much with activity. He denies night pain.     Objective: See treatment diary below       strength:  R = 105#  L = 70# (pain)      Assessment: Patient  has tenderness along bilateral ECU tendon sheaths and L APL/EPB tendon sheaths. Decreased strength secondary to pain on left. Responds positively to TFM.  Continued with dynamic wrist stabilization with minimal cueing required for technique. He is improving in proprioceptive awareness in bilateral wrists. Reports of \"sensitivity\" with exercises but no significant pain noted. He would benefit from continued PT for graded exercise and stabilization and to maximize independence with HEP for symptom management.     Plan: Continue PT  POC.      Precautions: n/a    HEP: cervical retraction and extension, Ulnar nerve glides  Manuals  2/2 2/6 2/8 2/14 2/21 2/23 2/28 3/13   L RH volar joint mobs            Upper/mid t/s joint mobs     C/s L UPA Gr. III C/S L UPA Gr. III      Posterior capsule stretch (B)            TFM EDC/ ECRB/L (L), APL/EPB (L) IASTM B CK  IASTM B CK     TFM ECU B, L APL/EPB x15' TFM ECU B, L APL/EPB x15'   KT 1st dorsal compartment                        LAD     R Gr. V                                            Neuro Re-Ed            Scap-4    GTB x20 ea  GTB x30 ea GTB x30 ea  GTB x30 ea no robbery GTB x30 ea no robber   Prone raises          " "  Flex bar cross for wrist stability  Ylw x2' ea  Ylw x3' ea         Body blade flex/ext in pro   30\"x3 ea  30\"x3 ea Plate rhythmic stab 30\"x3 ea  Plate rhythmic stab 30\"x3 Plate rhytmic stab 30\"x3   Wrist proprioception RD to neutral      X30 on R  RTB x30 ea RTB x30 R   [place] CW/CCW X2' ea  X2' ea   X2' ea ball on wall  ABC's x2 ea on wall ABC's x3 ea on wall   Weighted DTM Red med ball x2'  Red med bal x3'         Digiweb ball toss x2'  x2'     x2' x2'                                                                                       Ther Ex            T/s extension stretch with foam roller     5\"x10 5\" x10 ea      Supine SA punches            Sidelying ER (B)            Foam roller stretch at wall            SA walks at wall            Flex bar eccentrics         Ylw x20 ea               bike  x5'  x5'   x5'     Lateral band walks/monster walks       Red x3 laps ea     Hamstring stretch long sit  20\"x4          Bridge  2\"x30  3\"x30   HEP     Clam shell  X20 ea  X30 ea   HEP     Sidelying hip abd  X20 ea  X30 ea   HEP     Single leg bridge    3x5 ea   3x10 ea     Bridge with pball curl  3x5  3x5   3x8     Runners lunge  L2 2x10  L2 2x10        Lateral step down  L1 2x10  L1 2x10   L2 2x10     Forward step down in decline  2x10  2x10   3x10     Step up to balance       8\" 2x10     Lunge pulses on toes       2x8     SL RDL       2x10     SL rebounder on dynamic surface                                    Ther Activity                                    Gait Training                                    Modalities                                           "

## 2024-03-14 ENCOUNTER — OFFICE VISIT (OUTPATIENT)
Dept: PHYSICAL THERAPY | Facility: MEDICAL CENTER | Age: 34
End: 2024-03-14
Payer: COMMERCIAL

## 2024-03-14 DIAGNOSIS — G89.29 CHRONIC PAIN OF BOTH KNEES: ICD-10-CM

## 2024-03-14 DIAGNOSIS — M25.562 CHRONIC PAIN OF BOTH KNEES: ICD-10-CM

## 2024-03-14 DIAGNOSIS — M25.561 CHRONIC PAIN OF BOTH KNEES: ICD-10-CM

## 2024-03-14 DIAGNOSIS — M54.50 LOW BACK PAIN, UNSPECIFIED BACK PAIN LATERALITY, UNSPECIFIED CHRONICITY, UNSPECIFIED WHETHER SCIATICA PRESENT: Primary | ICD-10-CM

## 2024-03-14 PROCEDURE — 97140 MANUAL THERAPY 1/> REGIONS: CPT | Performed by: PHYSICAL THERAPIST

## 2024-03-14 NOTE — PROGRESS NOTES
Daily Note    Today's date: 3/14/2024  Patient name: Donald Collazo  : 1990  MRN: [id number]  Referring provider: Roberth Nowak P*  Dx:   Encounter Diagnosis     ICD-10-CM    1. Low back pain, unspecified back pain laterality, unspecified chronicity, unspecified whether sciatica present  M54.50       2. Chronic pain of both knees  M25.561     M25.562     G89.29                            Subjective: Patient reports increased knee and hip pain after driving 10 hours a week ago but states it has improved. He states he did not do much in the way of his home exercises because he was feeling great.    Objective: See treatment diary below      Assessment: Patient presents with improve motor control and strength of bilateral LE. He demonstrates improved bridging activity with maintaining a level pelvis. He also demonstrates improved dynamic valgus control with step down activity. Worked on balance and proprioceptive training today with airdisk and BOSU to challenge the knee and ankle. Patient fatigued post treatment session but denied any symptom provocation.spent time educating patient on long term managment of symptoms given his extensive history of knee injuries and surgeries.     Plan: Continue PT  POC.      Precautions: n/a    HEP: cervical retraction and extension, Ulnar nerve glides  Manuals 2/2 2/6 2/8 2/14 2/21 2/23 2/28 3/13 3/14   L RH volar joint mobs            Upper/mid t/s joint mobs    C/s L UPA Gr. III C/S L UPA Gr. III       Posterior capsule stretch (B)            TFM EDC/ ECRB/L (L), APL/EPB (L)  IASTM B CK     TFM ECU B, L APL/EPB x15' TFM ECU B, L APL/EPB x15'    KT 1st dorsal compartment                        LAD    R Gr. V                                             Neuro Re-Ed            Scap-4   GTB x20 ea  GTB x30 ea GTB x30 ea  GTB x30 ea no robbery GTB x30 ea no robber    Prone raises            Flex bar cross for wrist stability   Ylw x3' ea          Body blade  "flex/ext in pro  30\"x3 ea  30\"x3 ea Plate rhythmic stab 30\"x3 ea  Plate rhythmic stab 30\"x3 Plate rhytmic stab 30\"x3    Wrist proprioception RD to neutral     X30 on R  RTB x30 ea RTB x30 R    [place] CW/CCW  X2' ea   X2' ea ball on wall  ABC's x2 ea on wall ABC's x3 ea on wall    Weighted DTM  Red med bal x3'          Digiweb ball toss  x2'     x2' x2'                                                                                        Ther Ex            T/s extension stretch with foam roller    5\"x10 5\" x10 ea       Supine SA punches            Sidelying ER (B)            Foam roller stretch at wall            SA walks at wall            Flex bar eccentrics        Ylw x20 ea                bike x5'  x5'   x5'   x10'   Lateral band walks/monster walks      Red x3 laps ea   Red x3 laps ea   Hamstring stretch long sit 20\"x4           Bridge 2\"x30  3\"x30   HEP      Clam shell X20 ea  X30 ea   HEP      Sidelying hip abd X20 ea  X30 ea   HEP      Single leg bridge   3x5 ea   3x10 ea   3x10 ea   Bridge with pball curl 3x5  3x5   3x8   Ecc 3x8   Runners lunge L2 2x10  L2 2x10         Lateral step down L1 2x10  L1 2x10   L2 2x10   L2 3x10   Forward step down in decline 2x10  2x10   3x10   3x10   Step up to balance      8\" 2x10      Lunge pulses on toes      2x8   Bosu lunge 3x10   SL RDL      2x10   3x10   SL rebounder on dynamic surface         Disc x20 ea                           Ther Activity                                    Gait Training                                    Modalities                                           "

## 2024-03-19 ENCOUNTER — OFFICE VISIT (OUTPATIENT)
Dept: PHYSICAL THERAPY | Facility: MEDICAL CENTER | Age: 34
End: 2024-03-19
Payer: COMMERCIAL

## 2024-03-19 ENCOUNTER — HOSPITAL ENCOUNTER (OUTPATIENT)
Facility: MEDICAL CENTER | Age: 34
Discharge: HOME | End: 2024-03-19
Payer: COMMERCIAL

## 2024-03-19 DIAGNOSIS — M77.12 LATERAL EPICONDYLITIS OF BOTH ELBOWS: Primary | ICD-10-CM

## 2024-03-19 DIAGNOSIS — M77.11 LATERAL EPICONDYLITIS OF BOTH ELBOWS: Primary | ICD-10-CM

## 2024-03-19 DIAGNOSIS — M23.92 INTERNAL DERANGEMENT OF LEFT KNEE: ICD-10-CM

## 2024-03-19 DIAGNOSIS — M77.8 TENDINITIS OF BOTH WRISTS: ICD-10-CM

## 2024-03-19 PROCEDURE — 97140 MANUAL THERAPY 1/> REGIONS: CPT | Performed by: PHYSICAL THERAPIST

## 2024-03-19 PROCEDURE — 73721 MRI JNT OF LWR EXTRE W/O DYE: CPT

## 2024-03-19 PROCEDURE — 97110 THERAPEUTIC EXERCISES: CPT | Performed by: PHYSICAL THERAPIST

## 2024-03-19 NOTE — PROGRESS NOTES
"Daily Note    Today's date: 3/19/2024  Patient name: Donald Collazo  : 1990  MRN: [id number]  Referring provider: Roberth Nowak P*  Dx:   Encounter Diagnosis     ICD-10-CM    1. Lateral epicondylitis of both elbows  M77.11     M77.12       2. Tendinitis of both wrists  M77.8                            Subjective: Patient states his bilateral elbow and wrist pain is becoming less noticeable. He is optimistic he will be able to continue to progress with strengthening.    Objective: See treatment diary below      Assessment: Patient has minimal soft tissue restrictions. He has mild tension in proximal wrist extensors. He demonstrates improving motor control and strength and overall improved tolerance to activity. He is competent with all exercises.     Plan: Continue PT  POC.      Precautions: n/a    HEP: cervical retraction and extension, Ulnar nerve glides  Manuals 2/6 2/8 2/14 2/21 2/23 2/28 3/13 3/14 3/19   L RH volar joint mobs            Upper/mid t/s joint mobs   C/s L UPA Gr. III C/S L UPA Gr. III        Posterior capsule stretch (B)            TFM EDC/ ECRB/L (L), APL/EPB (L) IASTM B CK     TFM ECU B, L APL/EPB x15' TFM ECU B, L APL/EPB x15'  TFM ECU B, L APL/EPB x15'   KT 1st dorsal compartment                        LAD   R Gr. V                                              Neuro Re-Ed            Scap-4  GTB x20 ea  GTB x30 ea GTB x30 ea  GTB x30 ea no robbery GTB x30 ea no robber  BTB x20 no robbery   Prone raises            Flex bar cross for wrist stability  Ylw x3' ea           Body blade flex/ext in pro 30\"x3 ea  30\"x3 ea Plate rhythmic stab 30\"x3 ea  Plate rhythmic stab 30\"x3 Plate rhytmic stab 30\"x3  Plate rhythmic stab 1' x2   Wrist proprioception RD to neutral    X30 on R  RTB x30 ea RTB x30 R  GTB x30 R   [place] CW/CCW X2' ea   X2' ea ball on wall  ABC's x2 ea on wall ABC's x3 ea on wall  ABC\"s x3 on wall   Weighted DTM Red med bal x3'           Digiweb ball toss x2'     " "x2' x2'  x2'                                                                                       Ther Ex            T/s extension stretch with foam roller   5\"x10 5\" x10 ea        Supine SA punches            Sidelying ER (B)            Foam roller stretch at wall            SA walks at wall            Flex bar eccentrics       Ylw x20 ea  Red x20 ea               bike  x5'   x5'   x10'    Lateral band walks/monster walks     Red x3 laps ea   Red x3 laps ea    Hamstring stretch long sit            Bridge  3\"x30   HEP       Clam shell  X30 ea   HEP       Sidelying hip abd  X30 ea   HEP       Single leg bridge  3x5 ea   3x10 ea   3x10 ea    Bridge with pball curl  3x5   3x8   Ecc 3x8    Runners lunge  L2 2x10          Lateral step down  L1 2x10   L2 2x10   L2 3x10    Forward step down in decline  2x10   3x10   3x10    Step up to balance     8\" 2x10       Lunge pulses on toes     2x8   Bosu lunge 3x10    SL RDL     2x10   3x10    SL rebounder on dynamic surface        Disc x20 ea                            Ther Activity                                    Gait Training                                    Modalities                                           "

## 2024-03-21 ENCOUNTER — OFFICE VISIT (OUTPATIENT)
Dept: PHYSICAL THERAPY | Facility: MEDICAL CENTER | Age: 34
End: 2024-03-21
Payer: COMMERCIAL

## 2024-03-21 DIAGNOSIS — M25.562 CHRONIC PAIN OF BOTH KNEES: ICD-10-CM

## 2024-03-21 DIAGNOSIS — G89.29 CHRONIC PAIN OF BOTH KNEES: ICD-10-CM

## 2024-03-21 DIAGNOSIS — M54.50 LOW BACK PAIN, UNSPECIFIED BACK PAIN LATERALITY, UNSPECIFIED CHRONICITY, UNSPECIFIED WHETHER SCIATICA PRESENT: Primary | ICD-10-CM

## 2024-03-21 DIAGNOSIS — M25.561 CHRONIC PAIN OF BOTH KNEES: ICD-10-CM

## 2024-03-21 PROCEDURE — 97112 NEUROMUSCULAR REEDUCATION: CPT | Performed by: PHYSICAL THERAPIST

## 2024-03-21 PROCEDURE — 97110 THERAPEUTIC EXERCISES: CPT | Performed by: PHYSICAL THERAPIST

## 2024-03-21 NOTE — PROGRESS NOTES
"Daily Note    Today's date: 3/21/2024  Patient name: Donald Collazo  : 1990  MRN: [id number]  Referring provider: Roberth Nowak P*  Dx:   Encounter Diagnosis     ICD-10-CM    1. Low back pain, unspecified back pain laterality, unspecified chronicity, unspecified whether sciatica present  M54.50       2. Chronic pain of both knees  M25.561     M25.562     G89.29                            Subjective: Patient states LE symptoms are less noticeable. Feels his ankles are getting stronger.     Objective: See treatment diary below      Assessment: Patient continues to improve in dynamic stability and motor control of the lower quarter and core. He did require initial cueing with lunge mechanics for trail leg but was able to maintain good technique for remainder of reps. He demonstrates a much improved hip hinge with SL RDL. Overall uses ankle strategies appropriately with balance component of interventions. He has good quad control with eccentric lowering. Patient had no reports of symptom provocation during or post treatment session. Initiated discussion on beginning to transition to HEP.     Plan: Continue PT  POC.      Precautions: n/a    HEP: cervical retraction and extension, Ulnar nerve glides  Manuals 2/8 2/14 2/21 2/23 2/28 3/13 3/14 3/19 3/21   L RH volar joint mobs            Upper/mid t/s joint mobs  C/s L UPA Gr. III C/S L UPA Gr. III         Posterior capsule stretch (B)            TFM EDC/ ECRB/L (L), APL/EPB (L)     TFM ECU B, L APL/EPB x15' TFM ECU B, L APL/EPB x15'  TFM ECU B, L APL/EPB x15'    KT 1st dorsal compartment                        LAD  R Gr. V                                               Neuro Re-Ed            Scap-4   GTB x30 ea GTB x30 ea  GTB x30 ea no robbery GTB x30 ea no robber  BTB x20 no robbery    Prone raises            Flex bar cross for wrist stability             Body blade flex/ext in pro  30\"x3 ea Plate rhythmic stab 30\"x3 ea  Plate rhythmic stab 30\"x3 " "Plate rhytmic stab 30\"x3  Plate rhythmic stab 1' x2    Wrist proprioception RD to neutral   X30 on R  RTB x30 ea RTB x30 R  GTB x30 R    [place] CW/CCW   X2' ea ball on wall  ABC's x2 ea on wall ABC's x3 ea on wall  ABC\"s x3 on wall    Weighted DTM            Digiweb ball toss     x2' x2'  x2'                                                                                        Ther Ex            T/s extension stretch with foam roller  5\"x10 5\" x10 ea         Supine SA punches            Sidelying ER (B)            Foam roller stretch at wall            SA walks at wall            Flex bar eccentrics      Ylw x20 ea  Red x20 ea                bike x5'   x5'   x10'  x10'   Lateral band walks/monster walks    Red x3 laps ea   Red x3 laps ea  Green x3 laps ea   Hamstring stretch long sit            Bridge 3\"x30   HEP        Clam shell X30 ea   HEP        Sidelying hip abd X30 ea   HEP        Single leg bridge 3x5 ea   3x10 ea   3x10 ea     Bridge with pball curl 3x5   3x8   Ecc 3x8  Ecc 3x10   Runners lunge L2 2x10        [place] lunge on tip toes 2x10 ea   Lateral step down L1 2x10   L2 2x10   L2 3x10  L2 3x10   Forward step down in decline 2x10   3x10   3x10  3x10   Step up to balance    8\" 2x10        Lunge pulses on toes    2x8   Bosu lunge 3x10  Bosu lunge 3x10   SL RDL    2x10   3x10  3x10   SL rebounder on dynamic surface       Disc x20 ea  Invert bosu x30 ea   Wall sit with toe raises         2x20               Ther Activity                                    Gait Training                                    Modalities                                           "

## 2024-03-26 ENCOUNTER — APPOINTMENT (OUTPATIENT)
Dept: PHYSICAL THERAPY | Facility: MEDICAL CENTER | Age: 34
End: 2024-03-26
Payer: COMMERCIAL

## 2024-03-27 ENCOUNTER — OFFICE VISIT (OUTPATIENT)
Dept: PHYSICAL THERAPY | Facility: MEDICAL CENTER | Age: 34
End: 2024-03-27
Payer: COMMERCIAL

## 2024-03-27 DIAGNOSIS — M77.8 TENDINITIS OF BOTH WRISTS: ICD-10-CM

## 2024-03-27 DIAGNOSIS — M77.11 LATERAL EPICONDYLITIS OF BOTH ELBOWS: Primary | ICD-10-CM

## 2024-03-27 DIAGNOSIS — M77.12 LATERAL EPICONDYLITIS OF BOTH ELBOWS: Primary | ICD-10-CM

## 2024-03-27 PROCEDURE — 97110 THERAPEUTIC EXERCISES: CPT | Performed by: PHYSICAL THERAPIST

## 2024-03-27 PROCEDURE — 97140 MANUAL THERAPY 1/> REGIONS: CPT | Performed by: PHYSICAL THERAPIST

## 2024-03-27 PROCEDURE — 97112 NEUROMUSCULAR REEDUCATION: CPT | Performed by: PHYSICAL THERAPIST

## 2024-03-27 NOTE — PROGRESS NOTES
Daily Note    Today's date: 3/27/2024  Patient name: Donald Collazo  : 1990  MRN: [id number]  Referring provider: Roberth Nowak P*  Dx:   Encounter Diagnosis     ICD-10-CM    1. Lateral epicondylitis of both elbows  M77.11     M77.12       2. Tendinitis of both wrists  M77.8                            Subjective: Patient states he feels he has had a set back in wrist and elbow pain the last week. He is unable to identity any change in routine or activity that could be related to the change in symptoms. He states symptoms vary In intensity on the right and left   Follows up with rheumatology .     Objective: See treatment diary below      Assessment: Patient presents with left elbow pain at the lateral supracondylar ridge (ECRL tendon insertion) and ulnar border of wrist. He has mild symptoms on the ulnar border of the right wrist.   Symptoms are difficulty to reproduce. We can reproduce ulnar sided wrist pain at maximal end ranges of forearm rotation and deviation.  Patient has no mobility or joint restrictions. He has no evidence of instability throughout the wrists.   We have been working on wrist proprioceptive awareness with full kinetic chain movements with considerable cueing provided both manually and verbally for wrist positioning.  No symptoms were reproduced during treatment session with exception of mild left elbow pain during plate rhythmic stabilization which we did hold on continuing due to extended pronated position.   Extensive education has been provided on lifting and body mechanics.    Plan: Monitor response at next visit. Continue PT as appropriate. May consider placing patient on hold until follow up with rheumatology secondary to ongoing symptoms and recent loss in progress.      Precautions: n/a    HEP: cervical retraction and extension, Ulnar nerve glides  Manuals 2/14 2/21 2/23 2/28 3/13 3/14 3/19 3/21 3/27   L RH volar joint mobs            Upper/mid t/s joint  "mobs C/s L UPA Gr. III C/S L UPA Gr. III          Posterior capsule stretch (B)            TFM EDC/ ECRB/L (L), APL/EPB (L)    TFM ECU B, L APL/EPB x15' TFM ECU B, L APL/EPB x15'  TFM ECU B, L APL/EPB x15'  TFM ECU B, L APL/EPB x15'   KT 1st dorsal compartment                        LAD                                                 Neuro Re-Ed            Scap-4  GTB x30 ea GTB x30 ea  GTB x30 ea no robbery GTB x30 ea no robber  BTB x20 no robbery  PNF patterns RTB x20 ea   Prone raises            Flex bar cross for wrist stability          Red x2' ea   Wrist eccentrics flex bar         Red x20 ea   Body blade flex/ext in pro 30\"x3 ea Plate rhythmic stab 30\"x3 ea  Plate rhythmic stab 30\"x3 Plate rhytmic stab 30\"x3  Plate rhythmic stab 1' x2  Plate rhythmic stab 1' x1 (elbow pain)   Wrist proprioception RD to neutral  X30 on R  RTB x30 ea RTB x30 R  GTB x30 R     [place] CW/CCW  X2' ea ball on wall  ABC's x2 ea on wall ABC's x3 ea on wall  ABC\"s x3 on wall  ABC's x3 on wall   Weighted DTM            Digiweb ball toss    x2' x2'  x2'                                                                                         Ther Ex            T/s extension stretch with foam roller 5\"x10 5\" x10 ea          Supine SA punches            Sidelying ER (B)            Foam roller stretch at wall            SA walks at wall            Flex bar eccentrics     Ylw x20 ea  Red x20 ea                 bike   x5'   x10'  x10'    Lateral band walks/monster walks   Red x3 laps ea   Red x3 laps ea  Green x3 laps ea    Hamstring stretch long sit            Bridge   HEP         Clam shell   HEP         Sidelying hip abd   HEP         Single leg bridge   3x10 ea   3x10 ea      Bridge with pball curl   3x8   Ecc 3x8  Ecc 3x10    Runners lunge        [place] lunge on tip toes 2x10 ea    Lateral step down   L2 2x10   L2 3x10  L2 3x10    Forward step down in decline   3x10   3x10  3x10    Step up to balance   8\" 2x10         Lunge " pulses on toes   2x8   Bosu lunge 3x10  Bosu lunge 3x10    SL RDL   2x10   3x10  3x10    SL rebounder on dynamic surface      Disc x20 ea  Invert bosu x30 ea    Wall sit with toe raises        2x20                Ther Activity                                    Gait Training                                    Modalities

## 2024-03-28 ENCOUNTER — OFFICE VISIT (OUTPATIENT)
Dept: PHYSICAL THERAPY | Facility: MEDICAL CENTER | Age: 34
End: 2024-03-28
Payer: COMMERCIAL

## 2024-03-28 DIAGNOSIS — M54.50 LOW BACK PAIN, UNSPECIFIED BACK PAIN LATERALITY, UNSPECIFIED CHRONICITY, UNSPECIFIED WHETHER SCIATICA PRESENT: Primary | ICD-10-CM

## 2024-03-28 DIAGNOSIS — M25.561 CHRONIC PAIN OF BOTH KNEES: ICD-10-CM

## 2024-03-28 DIAGNOSIS — G89.29 CHRONIC PAIN OF BOTH KNEES: ICD-10-CM

## 2024-03-28 DIAGNOSIS — M25.562 CHRONIC PAIN OF BOTH KNEES: ICD-10-CM

## 2024-03-28 PROCEDURE — 97110 THERAPEUTIC EXERCISES: CPT | Performed by: PHYSICAL THERAPIST

## 2024-03-28 NOTE — PROGRESS NOTES
Daily Note    Today's date: 3/28/2024  Patient name: Donald Collazo  : 1990  MRN: [id number]  Referring provider: Roberth Nowak P*  Dx:   Encounter Diagnosis     ICD-10-CM    1. Low back pain, unspecified back pain laterality, unspecified chronicity, unspecified whether sciatica present  M54.50       2. Chronic pain of both knees  M25.561     M25.562     G89.29                            Subjective: Patient  reports no change from yesterday In wrist/elbow symptoms, however states since starting therapy his symptoms have overall improved. He reports he is feeling stronger in his lower quadrant and symptoms are not as noticeable.     Objective: See treatment diary below      Assessment: Patient is improving in lumbopelvic motor control and coordination, dynamic valgus control, and single limb control and stability. He has no pain during or post exercise performance but does report muscular fatigue. We had an extensive discussion on elbow symptoms and the chronicity. We agreed to trial a period of EPAT for symptom management and continue with current POC for lower quarter.     Plan: Decrease to 1x/week. Continue to work on core strengthening and LE strengthening. Trial EPAT for bilateral elbows at nv.      Precautions: n/a    HEP: cervical retraction and extension, Ulnar nerve glides  Manuals 2/21 2/23 2/28 3/13 3/14 3/19 3/21 3/27 3/28   L RH volar joint mobs            Upper/mid t/s joint mobs C/S L UPA Gr. III           Posterior capsule stretch (B)            TFM EDC/ ECRB/L (L), APL/EPB (L)   TFM ECU B, L APL/EPB x15' TFM ECU B, L APL/EPB x15'  TFM ECU B, L APL/EPB x15'  TFM ECU B, L APL/EPB x15'    KT 1st dorsal compartment                        LAD             EPAT B elbows         nv                           Neuro Re-Ed            Scap-4  GTB x30 ea  GTB x30 ea no robbery GTB x30 ea no robber  BTB x20 no robbery  PNF patterns RTB x20 ea    Prone raises            Flex bar cross for  "wrist stability         Red x2' ea    Wrist eccentrics flex bar        Red x20 ea    Body blade flex/ext in pro Plate rhythmic stab 30\"x3 ea  Plate rhythmic stab 30\"x3 Plate rhytmic stab 30\"x3  Plate rhythmic stab 1' x2  Plate rhythmic stab 1' x1 (elbow pain)    Wrist proprioception RD to neutral X30 on R  RTB x30 ea RTB x30 R  GTB x30 R      [place] CW/CCW X2' ea ball on wall  ABC's x2 ea on wall ABC's x3 ea on wall  ABC\"s x3 on wall  ABC's x3 on wall    Weighted DTM            Digiweb ball toss   x2' x2'  x2'                                                                                          Ther Ex            T/s extension stretch with foam roller 5\" x10 ea           Supine SA punches            Sidelying ER (B)            Foam roller stretch at wall            SA walks at wall            Flex bar eccentrics    Ylw x20 ea  Red x20 ea                  bike  x5'   x10'  x10'  x6'   Lateral band walks/monster walks  Red x3 laps ea   Red x3 laps ea  Green x3 laps ea  Green x3 laps   Hamstring stretch long sit            Bridge  HEP          Clam shell  HEP          Sidelying hip abd  HEP          Single leg bridge  3x10 ea   3x10 ea       Bridge with pball curl  3x8   Ecc 3x8  Ecc 3x10  Ecc 3x10   Runners lunge       [place] lunge on tip toes 2x10 ea  [place] lunge on tip toes 2x10 ea   Lateral step down  L2 2x10   L2 3x10  L2 3x10  L2 3x10   Forward step down in decline  3x10   3x10  3x10  3x10   Step up to balance  8\" 2x10          Lunge pulses on toes  2x8   Bosu lunge 3x10  Bosu lunge 3x10  Bosu lunge 31x0   SL RDL  2x10   3x10  3x10  3x10   SL rebounder on dynamic surface     Disc x20 ea  Invert bosu x30 ea  Invert bosu x30 ea   Wall sit with toe raises       2x20  2x20               Ther Activity                                    Gait Training                                    Modalities                                           "

## 2024-04-02 ENCOUNTER — OFFICE VISIT (OUTPATIENT)
Dept: PHYSICAL THERAPY | Facility: MEDICAL CENTER | Age: 34
End: 2024-04-02
Payer: COMMERCIAL

## 2024-04-02 DIAGNOSIS — M54.50 LOW BACK PAIN, UNSPECIFIED BACK PAIN LATERALITY, UNSPECIFIED CHRONICITY, UNSPECIFIED WHETHER SCIATICA PRESENT: Primary | ICD-10-CM

## 2024-04-02 DIAGNOSIS — G89.29 CHRONIC PAIN OF BOTH KNEES: ICD-10-CM

## 2024-04-02 DIAGNOSIS — M25.562 CHRONIC PAIN OF BOTH KNEES: ICD-10-CM

## 2024-04-02 DIAGNOSIS — M25.561 CHRONIC PAIN OF BOTH KNEES: ICD-10-CM

## 2024-04-02 PROCEDURE — 97110 THERAPEUTIC EXERCISES: CPT | Performed by: PHYSICAL THERAPIST

## 2024-04-02 NOTE — PROGRESS NOTES
Daily Note    Today's date: 2024  Patient name: Donald Collazo  : 1990  MRN: [id number]  Referring provider: Roberth Nowak P*  Dx:   Encounter Diagnosis     ICD-10-CM    1. Low back pain, unspecified back pain laterality, unspecified chronicity, unspecified whether sciatica present  M54.50       2. Chronic pain of both knees  M25.561     M25.562     G89.29                            Subjective: Patient  states his elbows have felt better the last few days and would like to hold on EPAT as to not aggravate his symptoms. He states he has less foot pain and increased ankle strength. He does have knee pain if he sits with his legs crossed or kneels.     Objective: See treatment diary below      Assessment: Patient continues to make steady improvements in lumbopelvic motor control and coordination, dynamic valgus control, and single limb control and stability. He has been able to progress with single limb strengthening. He is challenged with current exercise prescription with appropriate muscular fatigue. He has no reports of pain during exercises.   Patient would benefit from continued PT 1x/week for progressive strengthening with plan to transition to HEP over the next 4 weeks.     Plan: Decrease to 1x/week.     Precautions: n/a    HEP: cervical retraction and extension, Ulnar nerve glides  Manuals 2/23 2/28 3/13 3/14 3/19 3/21 3/27 3/28 4   L RH volar joint mobs            Upper/mid t/s joint mobs            Posterior capsule stretch (B)            TFM EDC/ ECRB/L (L), APL/EPB (L)  TFM ECU B, L APL/EPB x15' TFM ECU B, L APL/EPB x15'  TFM ECU B, L APL/EPB x15'  TFM ECU B, L APL/EPB x15'     KT 1st dorsal compartment                        LAD             EPAT B elbows        nv                            Neuro Re-Ed            Scap-4   GTB x30 ea no robbery GTB x30 ea no robber  BTB x20 no robbery  PNF patterns RTB x20 ea     Prone raises            Flex bar cross for wrist stability       "  Red x2' ea     Wrist eccentrics flex bar       Red x20 ea     Body blade flex/ext in pro  Plate rhythmic stab 30\"x3 Plate rhytmic stab 30\"x3  Plate rhythmic stab 1' x2  Plate rhythmic stab 1' x1 (elbow pain)     Wrist proprioception RD to neutral  RTB x30 ea RTB x30 R  GTB x30 R       [place] CW/CCW  ABC's x2 ea on wall ABC's x3 ea on wall  ABC\"s x3 on wall  ABC's x3 on wall     Weighted DTM            Digiweb ball toss  x2' x2'  x2'                                                                                           Ther Ex            T/s extension stretch with foam roller            Supine SA punches            Sidelying ER (B)            Foam roller stretch at wall            SA walks at wall            Flex bar eccentrics   Ylw x20 ea  Red x20 ea                   bike x5'   x10'  x10'  x6' x10'   Lateral band walks/monster walks Red x3 laps ea   Red x3 laps ea  Green x3 laps ea  Green x3 laps Green x3 laps   TB hip flexion          Green 3x10   Hamstring stretch long sit            Bridge HEP           Clam shell HEP           Sidelying hip abd HEP           Single leg bridge 3x10 ea   3x10 ea        Bridge with pball curl 3x8   Ecc 3x8  Ecc 3x10  Ecc 3x10 Ecc 3x12   Around the would lunge on tip toes      [place] lunge on tip toes 2x10 ea  [place] lunge on tip toes 2x10 ea RKB 3x10 ea   Lateral step down L2 2x10   L2 3x10  L2 3x10  L2 3x10 L2 3x10   Forward step down in decline 3x10   3x10  3x10  3x10 YKB 3x10   Step up to balance 8\" 2x10           Lunge pulses on toes 2x8   Bosu lunge 3x10  Bosu lunge 3x10  Bosu lunge 31x0 Bosu luge 3x10   SL RDL 2x10   3x10  3x10  3x10 YKB 3x10   SL rebounder on dynamic surface    Disc x20 ea  Invert bosu x30 ea  Invert bosu x30 ea Invertbosu x30 ea   Wall sit with toe raises      2x20  2x20 3x20               Ther Activity                                    Gait Training                                    Modalities                                  "

## 2024-04-04 ENCOUNTER — APPOINTMENT (OUTPATIENT)
Dept: PHYSICAL THERAPY | Facility: MEDICAL CENTER | Age: 34
End: 2024-04-04
Payer: COMMERCIAL

## 2024-04-09 ENCOUNTER — OFFICE VISIT (OUTPATIENT)
Dept: PHYSICAL THERAPY | Facility: MEDICAL CENTER | Age: 34
End: 2024-04-09
Payer: COMMERCIAL

## 2024-04-09 DIAGNOSIS — M25.562 CHRONIC PAIN OF BOTH KNEES: ICD-10-CM

## 2024-04-09 DIAGNOSIS — M25.561 CHRONIC PAIN OF BOTH KNEES: ICD-10-CM

## 2024-04-09 DIAGNOSIS — M54.50 LOW BACK PAIN, UNSPECIFIED BACK PAIN LATERALITY, UNSPECIFIED CHRONICITY, UNSPECIFIED WHETHER SCIATICA PRESENT: Primary | ICD-10-CM

## 2024-04-09 DIAGNOSIS — G89.29 CHRONIC PAIN OF BOTH KNEES: ICD-10-CM

## 2024-04-09 PROCEDURE — 97112 NEUROMUSCULAR REEDUCATION: CPT | Performed by: PHYSICAL THERAPIST

## 2024-04-09 PROCEDURE — 97110 THERAPEUTIC EXERCISES: CPT | Performed by: PHYSICAL THERAPIST

## 2024-04-09 NOTE — PROGRESS NOTES
Daily Note    Today's date: 2024  Patient name: Donald Collazo  : 1990  MRN: [id number]  Referring provider: Roberth Nowak P*  Dx:   Encounter Diagnosis     ICD-10-CM    1. Low back pain, unspecified back pain laterality, unspecified chronicity, unspecified whether sciatica present  M54.50       2. Chronic pain of both knees  M25.561     M25.562     G89.29                            Subjective: Patient  would like to continue to hold on any further treatment for elbows as they are feeling good. He feels at this point he is happy with how he has been feeling and feels he is headed in the right direction in regards to management of his symptoms.     Objective: See treatment diary below      Assessment: Joe demonstrates improved lumbopelvic motor control and stabilization.   During single limb strengthening he has improved control with maintaining a neutral spine and limiting hip and trunk rotation.   Challenged with sequencing and coordination with newly introduced exercises but improved with increased reps.   He did experience some paraspinal fatigue and quad fatigue but otherwise no pain reported.  Patient would benefit from continued PT 1x/week for progressive strengthening with plan to transition to HEP at the end of the month.     Plan: Continue PT 1x/week through end of month and then transition to HEP.     Precautions: n/a    HEP: cervical retraction and extension, Ulnar nerve glides  Manuals 3/19 3/21 3/27 3/28 4/2 4      L RH volar joint mobs            Upper/mid t/s joint mobs            Posterior capsule stretch (B)            TFM EDC/ ECRB/L (L), APL/EPB (L) TFM ECU B, L APL/EPB x15'  TFM ECU B, L APL/EPB x15'         KT 1st dorsal compartment                        LAD             EPAT B elbows    nv                                Neuro Re-Ed            Scap-4  BTB x20 no robbery  PNF patterns RTB x20 ea         Prone raises            Flex bar cross for wrist stability     "Red x2' ea         Wrist eccentrics flex bar   Red x20 ea         Body blade flex/ext in pro Plate rhythmic stab 1' x2  Plate rhythmic stab 1' x1 (elbow pain)         Wrist proprioception RD to neutral GTB x30 R           [place] CW/CCW ABC\"s x3 on wall  ABC's x3 on wall         Weighted DTM            Digiweb ball toss x2'                                                                                               Ther Ex            T/s extension stretch with foam roller            Supine SA punches            Sidelying ER (B)            Foam roller stretch at wall            SA walks at wall            Flex bar eccentrics Red x20 ea                       bike  x10'  x6' x10'       Lateral band walks/monster walks  Green x3 laps ea  Green x3 laps Green x3 laps Green x3 laps ea      TB hip flexion      Green 3x10 Green 3x10      Dead lift      BKB 3x10                                          Single leg bridge            Bridge with pball curl  Ecc 3x10  Ecc 3x10 Ecc 3x12 np      Around the would lunge on tip toes  Around [place] lunge on tip toes 2x10 ea  [place] lunge on tip toes 2x10 ea RKB 3x10 ea RKB 3x10 ea      Lateral step down  L2 3x10  L2 3x10 L2 3x10       Forward step down in decline  3x10  3x10 YKB 3x10 YKB 3x10      Step up to balance            Lunge pulses on toes  Bosu lunge 3x10  Bosu lunge 31x0 Bosu luge 3x10 np      SL RDL  3x10  3x10 YKB 3x10 YKB 3x10      SL rebounder on dynamic surface  Invert bosu x30 ea  Invert bosu x30 ea Invertbosu x30 ea Squat toss invert bosu x30      Wall sit with toe raises  2x20  2x20 3x20 3x20      Forward to rear lunge with mid way balance      2x10 ea                  Ther Activity                                    Gait Training                                    Modalities                                           "

## 2024-04-10 ENCOUNTER — OFFICE VISIT (OUTPATIENT)
Dept: OBGYN CLINIC | Facility: CLINIC | Age: 34
End: 2024-04-10
Payer: COMMERCIAL

## 2024-04-10 VITALS
WEIGHT: 217 LBS | HEIGHT: 68 IN | BODY MASS INDEX: 32.89 KG/M2 | DIASTOLIC BLOOD PRESSURE: 100 MMHG | SYSTOLIC BLOOD PRESSURE: 148 MMHG

## 2024-04-10 DIAGNOSIS — M17.2 POST-TRAUMATIC OSTEOARTHRITIS OF BOTH KNEES: ICD-10-CM

## 2024-04-10 DIAGNOSIS — M77.8 TENDONITIS OF BOTH WRISTS: Primary | ICD-10-CM

## 2024-04-10 DIAGNOSIS — M77.12 LATERAL EPICONDYLITIS OF BOTH ELBOWS: ICD-10-CM

## 2024-04-10 DIAGNOSIS — M77.11 LATERAL EPICONDYLITIS OF BOTH ELBOWS: ICD-10-CM

## 2024-04-10 PROCEDURE — 99213 OFFICE O/P EST LOW 20 MIN: CPT | Performed by: PHYSICIAN ASSISTANT

## 2024-04-10 NOTE — PROGRESS NOTES
Patient Name:  Donald Collazo  MRN:  [id number]    Assessment & Plan     Improving bilateral elbow lateral epicondylitis and bilateral wrist tendinitis.  Bilateral knee posttraumatic arthritis with history of ACL reconstruction bilaterally  Overall patient does note improvement with regards to his bilateral elbow and wrist pain with continue conservative management of physical therapy, activity modification, and anti-inflammatories.  Pain does however still limit him from performing activities of enjoyment.  At this time I recommend evaluation by one of our hand specialists, Dr. Mariee for any additional further recommendations.  I recommend he continue activity modification and physical therapy and home exercises at this time.  Overall patient's knees are feeling quite well since initiating physical therapy as well.  He may continue activities as tolerated modification to avoid pain at this time as well.  Follow-up with me as needed.  Patient is also seeing rheumatology in the near future as well.    Chief Complaint     Follow-up bilateral upper extremity pain.  Bilateral knee pain.    History of the Present Illness     Donald Collazo is a 34 y.o. male who returns to the office today for follow-up regarding his bilateral upper extremities as well as his bilateral knees.  He recently underwent MRIs of the bilateral knees and is here to review the results.  Overall his knees feel improved since initiating physical therapy.  He only notes intermittent discomfort at this time.  He denies any swelling stiffness weakness or instability.  He also reports overall improvement with regards to his bilateral elbows and wrists.  He continues to participate in physical therapy.  He does note some episodes of increased pain after increased activity and is apprehensive to perform some activities due to concerns of pain.  He feels as though is not able to do a push-up due to his pain.  He still notes generalized  "discomfort in the wrist as well as discomfort in the lateral elbow as well.  He states his ulnar-sided wrist pain has improved significantly.  He denies any significant numbness and tingling.  He denies any swelling or stiffness or weakness.  No fevers or chills.    Physical Exam     Ht 5' 8\" (1.727 m)   Wt 98.4 kg (217 lb)   BMI 32.99 kg/m²     Bilateral upper extremities: No gross deformity. Skin intact. No erythema ecchymosis or swelling.  Mild tenderness about the bilateral lateral epicondyles.. Full elbow range of motion bilaterally without pain. Elbows are stable to varus and valgus stress without pain.  Minimal discomfort in the lateral elbows with resisted wrist extension. No pain in the elbows with resisted wrist flexion. No tenderness along the ECU tendons bilaterally. No tenderness distal radius and distal ulna bilaterally. Full wrist flexion and extension with slight discomfort along the ulnar aspect of the wrist with wrist extension. No evidence of ECU tendon subluxation with wrist range of motion. Full composite fist formation without pain. Sensation intact median ulnar and radial nerves. 2+ radial pulse.     Eyes: Anicteric sclerae.  ENT: Trachea midline.  Lungs: Normal respiratory effort.  CV: Capillary refill is less than 2 seconds.  Skin: Intact without erythema.  Lymph: No palpable lymphadenopathy.  Neuro: Sensation is grossly intact to light touch.  Psych: Mood and affect are appropriate.    Data Review     I have personally reviewed pertinent films in PACS, and my interpretation follows:    MRI left knee 3/19/2024: ACL graft is intact.  Menisci are intact.  Mild medial and lateral tibiofemoral compartment degenerative changes.    MRI right knee 3/7/2024: ACL graft is intact.  No evidence of meniscus tear.  Focal moderate cartilage loss posterior weightbearing portion of the lateral femoral condyle.    Past Medical History:   Diagnosis Date    Migraine        Past Surgical History:   Procedure " Laterality Date    KNEE ARTHROSCOPY W/ ACL RECONSTRUCTION      has had it three times between two knees       Allergies   Allergen Reactions    Sulfa Antibiotics        Current Outpatient Medications on File Prior to Visit   Medication Sig Dispense Refill    gabapentin (Neurontin) 300 mg capsule Take 1 capsule (300 mg total) by mouth 3 (three) times a day 90 capsule 0    meloxicam (Mobic) 15 mg tablet Take 1 tablet (15 mg total) by mouth daily 30 tablet 0    Triamcinolone Acetonide 0.025 % LOTN Apply topically 2 (two) times a day Apply sparingly twice a day. Do not use more then 10 times a month 60 mL 3    Flowflex COVID-19 Ag Home Test KIT  (Patient not taking: Reported on 12/1/2023)      naproxen (Naprosyn) 500 mg tablet Take 1 tablet (500 mg total) by mouth 2 (two) times a day with meals for 10 days 20 tablet 0     No current facility-administered medications on file prior to visit.       Social History     Tobacco Use    Smoking status: Never    Smokeless tobacco: Never   Vaping Use    Vaping status: Never Used   Substance Use Topics    Alcohol use: Yes     Comment: socially     Drug use: Never       Family History   Problem Relation Age of Onset    No Known Problems Mother     Heart disease Father     Hypertension Father     Myasthenia gravis Father     Asthma Sister     Asthma Brother     Hypertension Maternal Grandmother     Seizures Maternal Grandfather     Glaucoma Maternal Grandfather     Hypertension Maternal Grandfather     Leukemia Maternal Grandfather     Migraines Paternal Grandmother     Breast cancer Paternal Grandmother     Hypertension Paternal Grandfather        Review of Systems     As stated in the HPI. All other systems reviewed and are negative.

## 2024-04-12 ENCOUNTER — TELEPHONE (OUTPATIENT)
Age: 34
End: 2024-04-12

## 2024-04-12 NOTE — TELEPHONE ENCOUNTER
Patients wanted to know if he could have labs before upcoming appt on 05/14/24. Advised orders are in the system

## 2024-04-16 ENCOUNTER — OFFICE VISIT (OUTPATIENT)
Dept: UROLOGY | Facility: CLINIC | Age: 34
End: 2024-04-16
Payer: COMMERCIAL

## 2024-04-16 ENCOUNTER — APPOINTMENT (OUTPATIENT)
Dept: PHYSICAL THERAPY | Facility: MEDICAL CENTER | Age: 34
End: 2024-04-16
Payer: COMMERCIAL

## 2024-04-16 VITALS
HEART RATE: 113 BPM | SYSTOLIC BLOOD PRESSURE: 140 MMHG | HEIGHT: 68 IN | DIASTOLIC BLOOD PRESSURE: 90 MMHG | BODY MASS INDEX: 32.74 KG/M2 | OXYGEN SATURATION: 97 % | WEIGHT: 216 LBS

## 2024-04-16 DIAGNOSIS — N50.812 LEFT TESTICULAR PAIN: Primary | ICD-10-CM

## 2024-04-16 PROCEDURE — 99213 OFFICE O/P EST LOW 20 MIN: CPT | Performed by: PHYSICIAN ASSISTANT

## 2024-04-16 NOTE — PROGRESS NOTES
4/16/2024      Chief Complaint   Patient presents with    Pelvic Floor Dysfunction    Epididymitis         Assessment and Plan    34 y.o. male managed for intermittent left sided testicular pain    Left testicular pain  Patient reports intermittent left sided testicular pain   Experiences an episode of pain once every 2 months  Last episode was 02/14/24   All previous urinalysis and STI testing have been negative  Patient underwent testicular and scrotal ultrasound, significant for:   No intratesticular mass or evidence of acute testicular torsion.  Small right hydrocele.  Patient's pain likely inflammatory versus bacterial in light of negative urinalysis results, history of multiple empiric antibiotics, and symptoms  Elevated inflammatory markers   CRP - 6.8  Sed Rate - 27   Discussed with the patient the importance of pelvic floor therapy and attending at least one session to learn the correct maneuvers  Discussed considering spermatic cord block if the patient's pain is unresolved with pelvic floor therapy.   Gabapentin discontinued as patient reports no longer taking it after experiencing an episode of pain while on it.          History of Present Illness  Donald Collazo is a 34 y.o. male here for follow-up regarding testicular pain. The patient was previously seen by Dr. Mcarthur for testicular pain, and at that time reported a dull throbbing pain in his lower back, abdomen and testicle on the left side. Previous attempts to treat this pain with empiric antibiotics in the ER and urgent care. Additionally, his Fiance is a NP and has prescribed antibiotics on several occasions.  The patient has had previously negative STI testing and has one stable partner. The patient's pain was thought to be inflammatory in origin versus a true bacterial infection. The patient's recent urinalysis results were unremarkable. The patient was following with physical therapy, but requested a referral to pelvic floor  "physical therapy. The patient reports that he has not yet gone to pelvic floor physical therapy because he has been busy attending his other physical therapy appointments. The patient states that he does still plan on going to pelvic floor therapy when he is able too. The patient stated his interest in the spermatic cord block, but had previously thought this was an every month procedure. The patient also noted that he stopped taking gabapentin after being on it during the time of an episode. The patient states that his last episode of left sided testicular pain was 02/14/24 and has been well since. The patient denies any dysuria, hematuria, fever, chills, nausea, or vomiting.     The patient underwent a scrotal and testicular ultrasound which appreciated a small right hydrocele, but no other significant findings.         Review of Systems   Constitutional:  Negative for chills and fever.   HENT:  Negative for ear pain and sore throat.    Eyes:  Negative for pain and visual disturbance.   Respiratory:  Negative for cough and shortness of breath.    Cardiovascular:  Negative for chest pain and palpitations.   Gastrointestinal:  Negative for abdominal pain, diarrhea, nausea and vomiting.   Genitourinary:  Positive for testicular pain (left). Negative for difficulty urinating, dysuria, hematuria, penile pain and urgency.   Musculoskeletal:  Negative for arthralgias and back pain.   Skin:  Negative for color change and rash.   Neurological:  Negative for seizures and syncope.   All other systems reviewed and are negative.               Vitals  Vitals:    04/16/24 0849   BP: 140/90   BP Location: Left arm   Patient Position: Sitting   Cuff Size: Standard   Pulse: (!) 113   SpO2: 97%   Weight: 98 kg (216 lb)   Height: 5' 8\" (1.727 m)       Physical Exam  Vitals reviewed.   Constitutional:       General: He is not in acute distress.     Appearance: Normal appearance. He is not ill-appearing.   HENT:      Head: " Normocephalic and atraumatic.      Nose: Nose normal.   Eyes:      General: No scleral icterus.  Abdominal:      General: Abdomen is flat. There is no distension.      Palpations: Abdomen is soft.      Tenderness: There is no abdominal tenderness.   Genitourinary:     Penis: Normal.       Testes: Normal.   Skin:     General: Skin is warm.      Coloration: Skin is not jaundiced.   Neurological:      Mental Status: He is alert.   Psychiatric:         Mood and Affect: Mood normal.         Behavior: Behavior normal.           Past History  Past Medical History:   Diagnosis Date    Migraine      Social History     Socioeconomic History    Marital status: Single     Spouse name: None    Number of children: None    Years of education: None    Highest education level: None   Occupational History    None   Tobacco Use    Smoking status: Never    Smokeless tobacco: Never   Vaping Use    Vaping status: Never Used   Substance and Sexual Activity    Alcohol use: Yes     Comment: socially     Drug use: Never    Sexual activity: None   Other Topics Concern    None   Social History Narrative    Consumes 4-5 cups of coffee per week     Social Determinants of Health     Financial Resource Strain: Not on file   Food Insecurity: Not on file   Transportation Needs: Not on file   Physical Activity: Not on file   Stress: Not on file   Social Connections: Not on file   Intimate Partner Violence: Not on file   Housing Stability: Not on file     Social History     Tobacco Use   Smoking Status Never   Smokeless Tobacco Never     Family History   Problem Relation Age of Onset    No Known Problems Mother     Heart disease Father     Hypertension Father     Myasthenia gravis Father     Asthma Sister     Asthma Brother     Hypertension Maternal Grandmother     Seizures Maternal Grandfather     Glaucoma Maternal Grandfather     Hypertension Maternal Grandfather     Leukemia Maternal Grandfather     Migraines Paternal Grandmother     Breast cancer  "Paternal Grandmother     Hypertension Paternal Grandfather        The following portions of the patient's history were reviewed and updated as appropriate: allergies, current medications, past medical history, past social history, past surgical history and problem list.    Results  No results found for this or any previous visit (from the past 1 hour(s)).]  No results found for: \"PSA\"  Lab Results   Component Value Date    CALCIUM 9.3 10/11/2023    K 4.2 10/11/2023    CO2 24 10/11/2023     10/11/2023    BUN 16 10/11/2023    CREATININE 1.06 10/11/2023     Lab Results   Component Value Date    WBC 8.95 10/11/2023    HGB 15.2 10/11/2023    HCT 44.1 10/11/2023    MCV 88 10/11/2023     10/11/2023      "

## 2024-04-16 NOTE — ASSESSMENT & PLAN NOTE
Patient reports intermittent left sided testicular pain   Experiences an episode of pain once every 2 months  Last episode was 02/14/24   All previous urinalysis and STI testing have been negative  Patient underwent testicular and scrotal ultrasound, significant for:   No intratesticular mass or evidence of acute testicular torsion.  Small right hydrocele.  Patient's pain likely inflammatory versus bacterial in light of negative urinalysis results, history of multiple empiric antibiotics, and symptoms  Elevated inflammatory markers   CRP - 6.8  Sed Rate - 27   Discussed with the patient the importance of pelvic floor therapy and attending at least one session to learn the correct maneuvers  Discussed considering spermatic cord block if the patient's pain is unresolved with pelvic floor therapy.   Gabapentin discontinued as patient reports no longer taking it after experiencing an episode of pain while on it.

## 2024-04-17 ENCOUNTER — CONSULT (OUTPATIENT)
Dept: RHEUMATOLOGY | Facility: CLINIC | Age: 34
End: 2024-04-17
Payer: COMMERCIAL

## 2024-04-17 VITALS
HEIGHT: 68 IN | SYSTOLIC BLOOD PRESSURE: 156 MMHG | HEART RATE: 115 BPM | WEIGHT: 213 LBS | OXYGEN SATURATION: 97 % | DIASTOLIC BLOOD PRESSURE: 96 MMHG | BODY MASS INDEX: 32.28 KG/M2

## 2024-04-17 DIAGNOSIS — M25.50 POLYARTHRALGIA: ICD-10-CM

## 2024-04-17 PROCEDURE — 99245 OFF/OP CONSLTJ NEW/EST HI 55: CPT | Performed by: INTERNAL MEDICINE
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
## 2024-04-23 ENCOUNTER — OFFICE VISIT (OUTPATIENT)
Dept: PHYSICAL THERAPY | Facility: MEDICAL CENTER | Age: 34
End: 2024-04-23
Payer: COMMERCIAL

## 2024-04-23 DIAGNOSIS — M54.50 LOW BACK PAIN, UNSPECIFIED BACK PAIN LATERALITY, UNSPECIFIED CHRONICITY, UNSPECIFIED WHETHER SCIATICA PRESENT: Primary | ICD-10-CM

## 2024-04-23 DIAGNOSIS — M25.561 CHRONIC PAIN OF BOTH KNEES: ICD-10-CM

## 2024-04-23 DIAGNOSIS — G89.29 CHRONIC PAIN OF BOTH KNEES: ICD-10-CM

## 2024-04-23 DIAGNOSIS — M25.562 CHRONIC PAIN OF BOTH KNEES: ICD-10-CM

## 2024-04-23 PROCEDURE — 97112 NEUROMUSCULAR REEDUCATION: CPT | Performed by: PHYSICAL THERAPIST

## 2024-04-23 PROCEDURE — 97110 THERAPEUTIC EXERCISES: CPT | Performed by: PHYSICAL THERAPIST

## 2024-04-23 NOTE — PROGRESS NOTES
Daily Note    Today's date: 2024  Patient name: Donald Collazo  : 1990  MRN: [id number]  Referring provider: Roberth Nowak P*  Dx:   Encounter Diagnosis     ICD-10-CM    1. Low back pain, unspecified back pain laterality, unspecified chronicity, unspecified whether sciatica present  M54.50       2. Chronic pain of both knees  M25.561     M25.562     G89.29                            Subjective: Patient followed up with rheumatology. They do not think his symptoms are related to any rheumatic disorders. He states his bilateral elbow and wrist pain continue to fluctuate but overall improved from before starting PT. He does follow up with hand surgeon next week. Bilateral knee, ankle, and LBP are improved. He notices continual progress and understands importance of continuing with comprehensive independent program to manage symptoms.     Objective: See treatment diary below      Assessment: Joe demonstrates improved motor control and strength with strengthening. He demonstrates improved trunk control and single limb balance. No reports of pain during exercise performance. Competent with all exercises. Discussed following up in 2 weeks with potential transition to HEP at that time.       Plan: Follow up in 2 weeks. Potential DC with comprehensive HEP at that time.     Precautions: n/a    HEP: cervical retraction and extension, Ulnar nerve glides  Manuals 3/19 3/21 3/27 3/28 4/2 4/9 4/23     L RH volar joint mobs            Upper/mid t/s joint mobs            Posterior capsule stretch (B)            TFM EDC/ ECRB/L (L), APL/EPB (L) TFM ECU B, L APL/EPB x15'  TFM ECU B, L APL/EPB x15'         KT 1st dorsal compartment                        LAD             EPAT B elbows    nv                                Neuro Re-Ed            Scap-4  BTB x20 no robbery  PNF patterns RTB x20 ea         Prone raises            Flex bar cross for wrist stability    Red x2' ea         Wrist eccentrics flex  "bar   Red x20 ea         Body blade flex/ext in pro Plate rhythmic stab 1' x2  Plate rhythmic stab 1' x1 (elbow pain)         Wrist proprioception RD to neutral GTB x30 R           [place] CW/CCW ABC\"s x3 on wall  ABC's x3 on wall         Weighted DTM            Digiweb ball toss x2'                                                                                               Ther Ex            T/s extension stretch with foam roller            Supine SA punches            Sidelying ER (B)            Foam roller stretch at wall            SA walks at wall            Flex bar eccentrics Red x20 ea                       bike  x10'  x6' x10'  x10'     Lateral band walks/monster walks  Green x3 laps ea  Green x3 laps Green x3 laps Green x3 laps ea Green x3 laps ea     TB hip flexion      Green 3x10 Green 3x10 Green 3x10      Dead lift      BKB 3x10 Black 3x10                                         Single leg bridge            Bridge with pball curl  Ecc 3x10  Ecc 3x10 Ecc 3x12 np      Around the would lunge on tip toes  Around the world lunge on tip toes 2x10 ea  Around the world lunge on tip toes 2x10 ea RKB 3x10 ea RKB 3x10 ea RKB 3x10 ea     Lateral step down  L2 3x10  L2 3x10 L2 3x10       Forward step down in decline  3x10  3x10 YKB 3x10 YKB 3x10 RKB 3x10     Step up to balance            Lunge pulses on toes  Bosu lunge 3x10  Bosu lunge 31x0 Bosu luge 3x10 np      SL RDL  3x10  3x10 YKB 3x10 YKB 3x10 RKB 3x10     SL rebounder on dynamic surface  Invert bosu x30 ea  Invert bosu x30 ea Invertbosu x30 ea Squat toss invert bosu x30 Squat toss invert bosu x30     Wall sit with toe raises  2x20  2x20 3x20 3x20 RKB 3x20     Forward to rear lunge with mid way balance      2x10 ea 2x10 ea                 Ther Activity                                    Gait Training                                    Modalities                                           "

## 2024-04-30 ENCOUNTER — APPOINTMENT (OUTPATIENT)
Dept: PHYSICAL THERAPY | Facility: MEDICAL CENTER | Age: 34
End: 2024-04-30
Payer: COMMERCIAL

## 2024-05-01 ENCOUNTER — OFFICE VISIT (OUTPATIENT)
Dept: OBGYN CLINIC | Facility: MEDICAL CENTER | Age: 34
End: 2024-05-01
Payer: COMMERCIAL

## 2024-05-01 VITALS
HEART RATE: 89 BPM | HEIGHT: 68 IN | DIASTOLIC BLOOD PRESSURE: 85 MMHG | BODY MASS INDEX: 32.58 KG/M2 | WEIGHT: 215 LBS | SYSTOLIC BLOOD PRESSURE: 124 MMHG

## 2024-05-01 DIAGNOSIS — S69.82XA INJURY OF TRIANGULAR FIBROCARTILAGE COMPLEX (TFCC) OF LEFT WRIST, INITIAL ENCOUNTER: ICD-10-CM

## 2024-05-01 DIAGNOSIS — S69.81XA TFCC (TRIANGULAR FIBROCARTILAGE COMPLEX) INJURY, RIGHT, INITIAL ENCOUNTER: ICD-10-CM

## 2024-05-01 DIAGNOSIS — M77.8 TENDINITIS OF BOTH WRISTS: ICD-10-CM

## 2024-05-01 DIAGNOSIS — M77.11 LATERAL EPICONDYLITIS OF BOTH ELBOWS: Primary | ICD-10-CM

## 2024-05-01 DIAGNOSIS — M77.12 LATERAL EPICONDYLITIS OF BOTH ELBOWS: Primary | ICD-10-CM

## 2024-05-01 PROCEDURE — 99213 OFFICE O/P EST LOW 20 MIN: CPT | Performed by: ORTHOPAEDIC SURGERY

## 2024-05-01 NOTE — PROGRESS NOTES
The HAND & UPPER EXTREMITY OFFICE VISIT   Referred By:  Roberth Nowak Pa-c  [address] [facility]  [address]  [place] [address]      Chief Complaint:     Bilateral wrist and elbow pains    History of Present Illness:   34 y.o., Right hand dominant male presents with bilateral wrist and elbow pain  Patient states his pain started last fall with no known injury. He decided to seek treatment and was sen by Roberth ARCEO. He started therapy for his wrist and elbows. He states since then he has noticed that his pain has improved, He quezada shave moment af flare ups Right now his elbows are bothering him more R>L . He has been limiting his activities as he does not wish to have a fare up and a set back    ADLs: Community ambulator  Smoke: no   ETOH: socailly   Drugs:  no  Job:        Past Medical History:  Past Medical History:   Diagnosis Date    Migraine      Past Surgical History:   Procedure Laterality Date    KNEE ARTHROSCOPY W/ ACL RECONSTRUCTION      has had it three times between two knees     Family History   Problem Relation Age of Onset    No Known Problems Mother     Heart disease Father     Hypertension Father     Myasthenia gravis Father     Asthma Sister     Asthma Brother     Hypertension Maternal Grandmother     Seizures Maternal Grandfather     Glaucoma Maternal Grandfather     Hypertension Maternal Grandfather     Leukemia Maternal Grandfather     Migraines Paternal Grandmother     Breast cancer Paternal Grandmother     Hypertension Paternal Grandfather      Social History     Socioeconomic History    Marital status: Single     Spouse name: Not on file    Number of children: Not on file    Years of education: Not on file    Highest education level: Not on file   Occupational History    Not on file   Tobacco Use    Smoking status: Never    Smokeless tobacco: Never   Vaping Use    Vaping status: Never Used   Substance and Sexual Activity    Alcohol use: Yes     Comment: socially     Drug use:  "Never    Sexual activity: Not on file   Other Topics Concern    Not on file   Social History Narrative    Consumes 4-5 cups of coffee per week     Social Determinants of Health     Financial Resource Strain: Not on file   Food Insecurity: Not on file   Transportation Needs: Not on file   Physical Activity: Not on file   Stress: Not on file   Social Connections: Not on file   Intimate Partner Violence: Not on file   Housing Stability: Not on file     Scheduled Meds:  Continuous Infusions:No current facility-administered medications for this visit.    PRN Meds:.  Allergies   Allergen Reactions    Sulfa Antibiotics            Physical Examination:    /85   Pulse 89   Ht 5' 8\" (1.727 m)   Wt 97.5 kg (215 lb)   BMI 32.69 kg/m²     Gen: A&Ox3, NAD  Cardiac: regular rate  Chest: non labored breathing  Abdomen: Non-distended        Right Upper Extremity:  Skin CDI  No obvious deformity of the shoulder, arm, elbow, forearm, wrist, hand  Sensation intact to light touch in the axillary median, ulnar, and radial nerve distributions  2+RP    Composite fist  Tender at TFCC fovea  Non tender FCU , ECU  Mild pain shuck DRUJ  Neg cozens    Left Upper Extremity:  Skin CDI  No obvious deformity of the shoulder, arm, elbow, forearm, wrist, hand  Sensation intact to light touch in the axillary median, ulnar, and radial nerve distributions  2+RP    Composite fist  TFCC fovea tenderness  Non tender ECU  Mild pain with resisted elbow extension on Left (positive cozens)      Negative synergy test bilaterally    Mild tenderness at bilateral lateral epicondyles      Studies:  No images obtain or reviewed      Assessment and Plan:  1. Lateral epicondylitis of both elbows        2. Injury of triangular fibrocartilage complex (TFCC) of left wrist, initial encounter        3. Tendinitis of both wrists        4. TFCC (triangular fibrocartilage complex) injury, right, initial encounter            34 y.o. male presents with signs and " symptoms consistent with the above diagnosis.  We discussed the natural history of this condition and its pathogenesis.  We discussed operative and nonoperative treatment options.  We discussed that he may have a TFCC injury in both wrists.   We can provided a steroid injection in his wrists. He feels that his pain does not warrant one today    We discussed time will generally resolve his Lateral epicondylitis. We discussed that steroid injections have not been shown in the literature to provide significant or long-lasting relief.  So not surprised that he had minimal relief from these.  As far as further treatment options I recommend he continue with therapy as long as he feels like he is getting benefit and from transition to home exercises.  We discussed that it can take 12 to 15 months for the symptoms to fully resolve.  We discussed that alternative options include PRP injections, dry needling, Tenex procedure or surgery.  We discussed that the literature does not support specific benefit from any conservative option for lateral epicondylitis aside from time and waiting.  Surgery could potentially be helpful in the future but we discussed that is not likely to resolve all of his symptoms and that there are some complications.  Says his pain is not very severe and has been slowly improving I recommend continuing with nonoperative management.        It is recommended they Return if symptoms worsen or fail to improve..       he expressed understanding of the plan and agreed. We encouraged them to contact our office with any questions or concerns.         Harvinder Mariee MD  Hand and Upper Extremity Surgery        *This note was dictated using Dragon voice recognition software. Please excuse any word substitutions or errors.*

## 2024-05-07 ENCOUNTER — OFFICE VISIT (OUTPATIENT)
Dept: PHYSICAL THERAPY | Facility: MEDICAL CENTER | Age: 34
End: 2024-05-07
Payer: COMMERCIAL

## 2024-05-07 DIAGNOSIS — M25.562 CHRONIC PAIN OF BOTH KNEES: ICD-10-CM

## 2024-05-07 DIAGNOSIS — M25.561 CHRONIC PAIN OF BOTH KNEES: ICD-10-CM

## 2024-05-07 DIAGNOSIS — G89.29 CHRONIC PAIN OF BOTH KNEES: ICD-10-CM

## 2024-05-07 DIAGNOSIS — M54.50 LOW BACK PAIN, UNSPECIFIED BACK PAIN LATERALITY, UNSPECIFIED CHRONICITY, UNSPECIFIED WHETHER SCIATICA PRESENT: Primary | ICD-10-CM

## 2024-05-07 PROCEDURE — 97110 THERAPEUTIC EXERCISES: CPT | Performed by: PHYSICAL THERAPIST

## 2024-05-07 PROCEDURE — 97140 MANUAL THERAPY 1/> REGIONS: CPT | Performed by: PHYSICAL THERAPIST

## 2024-05-07 PROCEDURE — 97112 NEUROMUSCULAR REEDUCATION: CPT | Performed by: PHYSICAL THERAPIST

## 2024-05-07 NOTE — PROGRESS NOTES
Daily Note    Today's date: 2024  Patient name: Donald Collazo  : 1990  MRN: [id number]  Referring provider: Roberth Nowak P*  Dx:   Encounter Diagnosis     ICD-10-CM    1. Low back pain, unspecified back pain laterality, unspecified chronicity, unspecified whether sciatica present  M54.50       2. Chronic pain of both knees  M25.561     M25.562     G89.29                            Subjective: Joe followed up with Dr. Mariee. Declined injections. No concerning exam findings and he should continue with PT and transition to a home program. Elbows have been a little more flared up the past few days, otherwise still overall better from initial onset. No issues regarding lower back or knees. He feels stronger. He is getting into a routine to be able to continue with a comprehensive program to manage symptoms.     Objective: See treatment diary below      Assessment: Reviewed stretches and bands for managing bilateral elbow pain. IASTM performed with decreased soreness in bilateral elbows following.  Patient is competent with comprehensive program for LE and core strengthening. He demonstrates much improved squat mechanics and valgus control with single limb strengthening.  Patient will work on his comprehensive program over the next 2 weeks.   We did discuss transitioning to a home program at his 2 week follow up as appropriate. Patient is confident in being able to keep up with his exercises to manage his conditions.       Plan: Follow up in 2 weeks. Potential DC with comprehensive HEP at that time.     Precautions: n/a    HEP: cervical retraction and extension, Ulnar nerve glides  Manuals 3/19 3/21 3/27 3/28 4/ 5/7    L RH volar joint mobs            Upper/mid t/s joint mobs            Posterior capsule stretch (B)            TFM EDC/ ECRB/L (L), APL/EPB (L) TFM ECU B, L APL/EPB x15'  TFM ECU B, L APL/EPB x15'     IASTM bilateral elbows x10'    KT 1st dorsal compartment          "               LAD             EPAT B elbows    nv                                Neuro Re-Ed            Scap-4  BTB x20 no robbery  PNF patterns RTB x20 ea         Prone raises            Flex bar cross for wrist stability    Red x2' ea         Wrist eccentrics flex bar   Red x20 ea         Body blade flex/ext in pro Plate rhythmic stab 1' x2  Plate rhythmic stab 1' x1 (elbow pain)         Wrist proprioception RD to neutral GTB x30 R           [place] CW/CCW ABC\"s x3 on wall  ABC's x3 on wall         Weighted DTM            Digiweb ball toss x2'                                                                                               Ther Ex            T/s extension stretch with foam roller            Supine SA punches            Sidelying ER (B)            Foam roller stretch at wall            SA walks at wall            Flex bar eccentrics Red x20 ea                       bike  x10'  x6' x10'  x10' x10'    Lateral band walks/monster walks  Green x3 laps ea  Green x3 laps Green x3 laps Green x3 laps ea Green x3 laps ea Green x3 laps ea    TB hip flexion      Green 3x10 Green 3x10 Green 3x10  Green 3x10    Dead lift      BKB 3x10 Black 3x10 Black 3x10                                        Single leg bridge            Bridge with pball curl  Ecc 3x10  Ecc 3x10 Ecc 3x12 np      Around the would lunge on tip toes  Around the world lunge on tip toes 2x10 ea  Around the world lunge on tip toes 2x10 ea RKB 3x10 ea RKB 3x10 ea RKB 3x10 ea GKB 3x10 ea    Lateral step down  L2 3x10  L2 3x10 L2 3x10       Forward step down in decline  3x10  3x10 YKB 3x10 YKB 3x10 RKB 3x10 GKB 2x10    Step up to balance            Lunge pulses on toes  Bosu lunge 3x10  Bosu lunge 31x0 Bosu luge 3x10 np      SL RDL  3x10  3x10 YKB 3x10 YKB 3x10 RKB 3x10 GKB 2x10    SL rebounder on dynamic surface  Invert bosu x30 ea  Invert bosu x30 ea Invertbosu x30 ea Squat toss invert bosu x30 Squat toss invert bosu x30 Squat toss invert bosu x30  "   Wall sit with toe raises  2x20  2x20 3x20 3x20 RKB 3x20 GKB 3x20    Forward to rear lunge with mid way balance      2x10 ea 2x10 ea 2x10 ea                Ther Activity                                    Gait Training                                    Modalities

## 2024-05-10 ENCOUNTER — APPOINTMENT (OUTPATIENT)
Dept: LAB | Facility: CLINIC | Age: 34
End: 2024-05-10
Payer: COMMERCIAL

## 2024-05-10 DIAGNOSIS — E78.5 HYPERLIPIDEMIA, UNSPECIFIED HYPERLIPIDEMIA TYPE: ICD-10-CM

## 2024-05-10 DIAGNOSIS — M25.50 POLYARTHRALGIA: ICD-10-CM

## 2024-05-10 LAB
ALBUMIN SERPL BCP-MCNC: 4.9 G/DL (ref 3.5–5)
ALP SERPL-CCNC: 58 U/L (ref 34–104)
ALT SERPL W P-5'-P-CCNC: 19 U/L (ref 7–52)
ANION GAP SERPL CALCULATED.3IONS-SCNC: 8 MMOL/L (ref 4–13)
AST SERPL W P-5'-P-CCNC: 20 U/L (ref 13–39)
BILIRUB SERPL-MCNC: 0.66 MG/DL (ref 0.2–1)
BUN SERPL-MCNC: 16 MG/DL (ref 5–25)
CALCIUM SERPL-MCNC: 9.9 MG/DL (ref 8.4–10.2)
CHLORIDE SERPL-SCNC: 100 MMOL/L (ref 96–108)
CHOLEST SERPL-MCNC: 265 MG/DL
CO2 SERPL-SCNC: 28 MMOL/L (ref 21–32)
CREAT SERPL-MCNC: 0.95 MG/DL (ref 0.6–1.3)
GFR SERPL CREATININE-BSD FRML MDRD: 103 ML/MIN/1.73SQ M
GLUCOSE P FAST SERPL-MCNC: 86 MG/DL (ref 65–99)
HDLC SERPL-MCNC: 44 MG/DL
LDLC SERPL CALC-MCNC: 192 MG/DL (ref 0–100)
POTASSIUM SERPL-SCNC: 4.1 MMOL/L (ref 3.5–5.3)
PROT SERPL-MCNC: 8 G/DL (ref 6.4–8.4)
SODIUM SERPL-SCNC: 136 MMOL/L (ref 135–147)
TRIGL SERPL-MCNC: 146 MG/DL

## 2024-05-10 PROCEDURE — 80061 LIPID PANEL: CPT

## 2024-05-10 PROCEDURE — 86364 TISS TRNSGLTMNASE EA IG CLAS: CPT

## 2024-05-10 PROCEDURE — 80053 COMPREHEN METABOLIC PANEL: CPT

## 2024-05-10 PROCEDURE — 86747 PARVOVIRUS ANTIBODY: CPT

## 2024-05-10 PROCEDURE — 36415 COLL VENOUS BLD VENIPUNCTURE: CPT

## 2024-05-11 LAB
B19V IGG SER IA-ACNC: 0.1 INDEX (ref 0–0.8)
B19V IGM SER IA-ACNC: 0.2 INDEX (ref 0–0.8)
TTG IGA SER-ACNC: <2 U/ML (ref 0–3)

## 2024-05-14 ENCOUNTER — OFFICE VISIT (OUTPATIENT)
Dept: FAMILY MEDICINE CLINIC | Facility: CLINIC | Age: 34
End: 2024-05-14
Payer: COMMERCIAL

## 2024-05-14 VITALS
OXYGEN SATURATION: 98 % | WEIGHT: 205.4 LBS | DIASTOLIC BLOOD PRESSURE: 82 MMHG | HEART RATE: 124 BPM | SYSTOLIC BLOOD PRESSURE: 124 MMHG | TEMPERATURE: 96.7 F | BODY MASS INDEX: 31.13 KG/M2 | HEIGHT: 68 IN

## 2024-05-14 DIAGNOSIS — E78.5 HYPERLIPIDEMIA, UNSPECIFIED HYPERLIPIDEMIA TYPE: Primary | ICD-10-CM

## 2024-05-14 DIAGNOSIS — Z82.49 FAMILY HISTORY OF PREMATURE CAD: ICD-10-CM

## 2024-05-14 DIAGNOSIS — E66.9 OBESITY (BMI 30-39.9): ICD-10-CM

## 2024-05-14 PROCEDURE — 99214 OFFICE O/P EST MOD 30 MIN: CPT | Performed by: FAMILY MEDICINE

## 2024-05-14 NOTE — PROGRESS NOTES
"Chief Complaint   Patient presents with    Follow-up     Recheck cholesterol and weight check.       Patient Instructions   Here to discuss weight and high cholesterol and patient would like to try diet and exercise mods to help lower cholesterol and recheck labs in 6 months. Rec low sugar diet and high fiber and avoid high cholesterol foods. Exercise to increase hdl and increase soluble fiber. Use sunscreen and monitor for ticks.   Assessment/Plan:    No problem-specific Assessment & Plan notes found for this encounter.       Diagnoses and all orders for this visit:    Hyperlipidemia, unspecified hyperlipidemia type    Obesity (BMI 30-39.9)    Family history of premature CAD          Subjective:      Patient ID: Donald Collazo is a 34 y.o. male.    Here for weight check and high ldl cholesterola nd prefers lifestyle mods over meds at this point. Patient does exercise and tries to eat healthy but hindered by knee and hip pain and elbow tendonitis until April.         The following portions of the patient's history were reviewed and updated as appropriate: allergies, current medications, past family history, past medical history, past social history, past surgical history, and problem list.    Review of Systems   Constitutional: Negative.    HENT: Negative.     Eyes: Negative.    Respiratory: Negative.     Cardiovascular: Negative.    Gastrointestinal: Negative.    Endocrine: Negative.    Genitourinary: Negative.    Musculoskeletal: Negative.    Skin: Negative.    Allergic/Immunologic: Negative.    Neurological: Negative.    Hematological: Negative.    Psychiatric/Behavioral: Negative.           Objective:      /82   Pulse (!) 124   Temp (!) 96.7 °F (35.9 °C) (Temporal)   Ht 5' 8\" (1.727 m)   Wt 93.2 kg (205 lb 6.4 oz)   SpO2 98%   BMI 31.23 kg/m²          Physical Exam  Constitutional:       Appearance: He is well-developed. He is obese.   HENT:      Head: Normocephalic and atraumatic.      " Right Ear: External ear normal.      Left Ear: External ear normal.      Nose: Nose normal.   Eyes:      Conjunctiva/sclera: Conjunctivae normal.      Pupils: Pupils are equal, round, and reactive to light.   Cardiovascular:      Rate and Rhythm: Normal rate and regular rhythm.      Pulses: Normal pulses.      Heart sounds: Normal heart sounds.   Pulmonary:      Effort: Pulmonary effort is normal.      Breath sounds: Normal breath sounds.   Musculoskeletal:         General: Normal range of motion.      Cervical back: Normal range of motion and neck supple.   Skin:     General: Skin is warm and dry.      Capillary Refill: Capillary refill takes less than 2 seconds.   Neurological:      General: No focal deficit present.      Mental Status: He is alert and oriented to person, place, and time. Mental status is at baseline.      Deep Tendon Reflexes: Reflexes are normal and symmetric.   Psychiatric:         Mood and Affect: Mood normal.         Behavior: Behavior normal.         Thought Content: Thought content normal.         Judgment: Judgment normal.

## 2024-05-21 ENCOUNTER — APPOINTMENT (OUTPATIENT)
Dept: PHYSICAL THERAPY | Facility: MEDICAL CENTER | Age: 34
End: 2024-05-21
Payer: COMMERCIAL

## 2024-05-22 ENCOUNTER — HOSPITAL ENCOUNTER (OUTPATIENT)
Dept: ULTRASOUND IMAGING | Facility: HOSPITAL | Age: 34
Discharge: HOME | End: 2024-05-22
Attending: INTERNAL MEDICINE
Payer: COMMERCIAL

## 2024-05-22 DIAGNOSIS — M25.50 POLYARTHRALGIA: ICD-10-CM

## 2024-05-22 PROCEDURE — 76882 US LMTD JT/FCL EVL NVASC XTR: CPT

## 2024-05-24 ENCOUNTER — OFFICE VISIT (OUTPATIENT)
Dept: PHYSICAL THERAPY | Facility: MEDICAL CENTER | Age: 34
End: 2024-05-24
Payer: COMMERCIAL

## 2024-05-24 DIAGNOSIS — M25.562 CHRONIC PAIN OF BOTH KNEES: ICD-10-CM

## 2024-05-24 DIAGNOSIS — M25.561 CHRONIC PAIN OF BOTH KNEES: ICD-10-CM

## 2024-05-24 DIAGNOSIS — G89.29 CHRONIC PAIN OF BOTH KNEES: ICD-10-CM

## 2024-05-24 DIAGNOSIS — M54.50 LOW BACK PAIN, UNSPECIFIED BACK PAIN LATERALITY, UNSPECIFIED CHRONICITY, UNSPECIFIED WHETHER SCIATICA PRESENT: Primary | ICD-10-CM

## 2024-05-24 PROCEDURE — 97112 NEUROMUSCULAR REEDUCATION: CPT | Performed by: PHYSICAL THERAPIST

## 2024-05-24 PROCEDURE — 97110 THERAPEUTIC EXERCISES: CPT | Performed by: PHYSICAL THERAPIST

## 2024-05-24 PROCEDURE — 97140 MANUAL THERAPY 1/> REGIONS: CPT | Performed by: PHYSICAL THERAPIST

## 2024-05-24 NOTE — PROGRESS NOTES
Daily Note    Today's date: 2024  Patient name: Donald Collazo  : 1990  MRN: [id number]  Referring provider: Roberth Nowak P*  Dx:   Encounter Diagnosis     ICD-10-CM    1. Low back pain, unspecified back pain laterality, unspecified chronicity, unspecified whether sciatica present  M54.50       2. Chronic pain of both knees  M25.561     M25.562     G89.29                            Subjective: Joe states bilateral elbow, knee, ankle, and low back pain is manageable. He does have intermittent elbow flare ups but states overall symptoms are significantly improved from when he initially started PT.     Objective: See treatment diary below      Assessment: Joe has made steady improvement in general strength with improved trunk, shoulder, and lumbopelvic motor control. Symptoms are well managed at this time, however not completely resolved. Patient is competent with a comprehensive home program. I did educate patient on continue with this program for the next few months and I anticipate symptoms to continue to improve. I did educate him on natural course of his symptoms and that he may have occasional flare ups but as long as he continues to progress in a forward trajectory he should continue with his home program. Patient may follow up as needed should symptoms worsen or fail to continue to improve.     Plan: DC with independent HEP.    Precautions: n/a    HEP: cervical retraction and extension, Ulnar nerve glides  Manuals 3/19 3/21 3/27 3/28 4/   L RH volar joint mobs            Upper/mid t/s joint mobs            Posterior capsule stretch (B)            TFM EDC/ ECRB/L (L), APL/EPB (L) TFM ECU B, L APL/EPB x15'  TFM ECU B, L APL/EPB x15'     IASTM bilateral elbows x10' IASTM bilateral elbows x10'   KT 1st dorsal compartment                        LAD             EPAT B elbows    nv                                Neuro Re-Ed            Scap-4  BTB x20 no smith  PNF  "patterns RTB x20 ea         Prone raises            Flex bar cross for wrist stability    Red x2' ea         Wrist eccentrics flex bar   Red x20 ea         Body blade flex/ext in pro Plate rhythmic stab 1' x2  Plate rhythmic stab 1' x1 (elbow pain)         Wrist proprioception RD to neutral GTB x30 R           [place] CW/CCW ABC\"s x3 on wall  ABC's x3 on wall         Weighted DTM            Digiweb ball toss x2'                                                                                               Ther Ex            T/s extension stretch with foam roller            Supine SA punches            Sidelying ER (B)            Foam roller stretch at wall            SA walks at wall            Flex bar eccentrics Red x20 ea                       bike  x10'  x6' x10'  x10' x10' x10'   Lateral band walks/monster walks  Green x3 laps ea  Green x3 laps Green x3 laps Green x3 laps ea Green x3 laps ea Green x3 laps ea blue x3 laps ea   TB hip flexion      Green 3x10 Green 3x10 Green 3x10  Green 3x10 Blue 3x10   Dead lift      BKB 3x10 Black 3x10 Black 3x10 Black 3x10   Bridge with pball curl  Ecc 3x10  Ecc 3x10 Ecc 3x12 np      Around the would lunge on tip toes  Around the world lunge on tip toes 2x10 ea  Around the world lunge on tip toes 2x10 ea RKB 3x10 ea RKB 3x10 ea RKB 3x10 ea GKB 3x10 ea GKB 3x10 ea   Lateral step down  L2 3x10  L2 3x10 L2 3x10       Forward step down in decline  3x10  3x10 YKB 3x10 YKB 3x10 RKB 3x10 GKB 2x10 GKB 2x10    Step up to balance            Lunge pulses on toes  Bosu lunge 3x10  Bosu lunge 31x0 Bosu luge 3x10 np      SL RDL  3x10  3x10 YKB 3x10 YKB 3x10 RKB 3x10 GKB 2x10 GKB 2x10   SL rebounder on dynamic surface  Invert bosu x30 ea  Invert bosu x30 ea Invertbosu x30 ea Squat toss invert bosu x30 Squat toss invert bosu x30 Squat toss invert bosu x30 Squat toss invert bosu x30   Wall sit with toe raises  2x20  2x20 3x20 3x20 RKB 3x20 GKB 3x20 BKB 3x20   Forward to rear lunge with mid way " balance      2x10 ea 2x10 ea 2x10 ea 2x10 ea               Ther Activity                                    Gait Training                                    Modalities

## 2024-05-31 ENCOUNTER — TELEPHONE (OUTPATIENT)
Dept: RHEUMATOLOGY | Facility: CLINIC | Age: 34
End: 2024-05-31

## 2024-05-31 NOTE — TELEPHONE ENCOUNTER
Pls inform pt the ultrasound of hands showed mild inflammation right hand  I would like to see him in f/u in 2-3 weeks, pls schedule  thanks

## 2024-06-07 ENCOUNTER — NURSE TRIAGE (OUTPATIENT)
Age: 34
End: 2024-06-07

## 2024-06-07 DIAGNOSIS — Z87.442 HX OF URINARY STONE: Primary | ICD-10-CM

## 2024-06-07 NOTE — TELEPHONE ENCOUNTER
Patient called in states they passed a kidney stone and saved it. Denies any urinary symptoms or pain at all at this time. Reviewed bladder irritants to avoid, and to stay hydrated with at least 64 oz. Of water/day as long as no fluid restrictions. ED precautions reviewed as well.  He will bring stone to lab for analysis.

## 2024-06-11 ENCOUNTER — APPOINTMENT (OUTPATIENT)
Dept: LAB | Facility: CLINIC | Age: 34
End: 2024-06-11
Payer: COMMERCIAL

## 2024-06-11 DIAGNOSIS — Z87.442 HX OF URINARY STONE: ICD-10-CM

## 2024-06-11 PROCEDURE — 82360 CALCULUS ASSAY QUANT: CPT

## 2024-06-18 LAB
CALCIUM OXALATE DIHYDRATE MFR STONE IR: 50 %
COLOR STONE: NORMAL
COM MFR STONE: 50 %
COMMENT-STONE3: NORMAL
COMPOSITION: NORMAL
LABORATORY COMMENT REPORT: NORMAL
PHOTO: NORMAL
SIZE STONE: NORMAL MM
SPEC SOURCE SUBJ: NORMAL
STONE ANALYSIS-IMP: NORMAL
WT STONE: 84 MG

## 2024-06-25 ENCOUNTER — OFFICE VISIT (OUTPATIENT)
Dept: RHEUMATOLOGY | Facility: CLINIC | Age: 34
End: 2024-06-25
Payer: COMMERCIAL

## 2024-06-25 VITALS
BODY MASS INDEX: 31.22 KG/M2 | DIASTOLIC BLOOD PRESSURE: 84 MMHG | HEART RATE: 120 BPM | HEIGHT: 68 IN | SYSTOLIC BLOOD PRESSURE: 140 MMHG | OXYGEN SATURATION: 98 % | WEIGHT: 206 LBS

## 2024-06-25 DIAGNOSIS — M25.50 POLYARTHRALGIA: Primary | ICD-10-CM

## 2024-06-25 PROCEDURE — 99214 OFFICE O/P EST MOD 30 MIN: CPT | Performed by: INTERNAL MEDICINE
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
## 2024-09-18 ENCOUNTER — COSMETIC (OUTPATIENT)
Dept: DERMATOLOGY | Facility: CLINIC | Age: 34
End: 2024-09-18
Payer: COMMERCIAL

## 2024-09-18 ENCOUNTER — OFFICE VISIT (OUTPATIENT)
Dept: DERMATOLOGY | Facility: CLINIC | Age: 34
End: 2024-09-18
Payer: COMMERCIAL

## 2024-09-18 VITALS — TEMPERATURE: 98.1 F | BODY MASS INDEX: 32.39 KG/M2 | WEIGHT: 213 LBS

## 2024-09-18 DIAGNOSIS — Q82.8 ACCESSORY SKIN TAGS: Primary | ICD-10-CM

## 2024-09-18 DIAGNOSIS — D23.9 DERMATOFIBROMA: ICD-10-CM

## 2024-09-18 DIAGNOSIS — L72.0 EPIDERMAL CYST: Primary | ICD-10-CM

## 2024-09-18 PROCEDURE — SKNTG10 SKIN TAG REMOVAL UP TO 10: Performed by: DERMATOLOGY

## 2024-09-18 NOTE — PROGRESS NOTES
"[facility] Clinic Note     Patient Name: Donald Collazo  Encounter Date: 9/18/2024     Have you been cared for by a [facility] Dermatologist in the last 3 years and, if so, which description applies to you?    Yes.  I have been here within the last 3 years, and my medical history has NOT changed since that time.  I am MALE/not capable of bearing children.    REVIEW OF SYSTEMS:  Have you recently had or currently have any of the following? No changes in my recent health.   PAST MEDICAL HISTORY:  Have you personally ever had or currently have any of the following?  If \"YES,\" then please provide more detail. No changes in my medical history.   HISTORY OF IMMUNOSUPPRESSION: Do you have a history of any of the following:  Systemic Immunosuppression such as Diabetes, Biologic or Immunotherapy, Chemotherapy, Organ Transplantation, Bone Marrow Transplantation or Prednisone?  No     Answering \"YES\" requires the addition of the dotphrase \"IMMUNOSUPPRESSED\" as the first diagnosis of the patient's visit.   FAMILY HISTORY:  Any \"first degree relatives\" (parent, brother, sister, or child) with the following?    No changes in my family's known health.   PATIENT EXPERIENCE:    Do you want the Dermatologist to perform a COMPLETE skin exam today including a clinical examination under the \"bra and underwear\" areas?  NO  If necessary, do we have your permission to call and leave a detailed message on your Preferred Phone number that includes your specific medical information?  Yes      Allergies   Allergen Reactions    Sulfa Antibiotics       Current Outpatient Medications:     Triamcinolone Acetonide 0.025 % LOTN, Apply topically 2 (two) times a day Apply sparingly twice a day. Do not use more then 10 times a month (Patient taking differently: Apply topically if needed Apply sparingly twice a day. Do not use more then 10 times a month), Disp: 60 mL, Rfl: 3        Whom besides the patient is providing clinical " "information about today's encounter?   NO ADDITIONAL HISTORIAN (patient alone provided history)    Physical Exam and Assessment/Plan by Diagnosis:    EPIDERMAL INCLUSION CYST    Physical Exam:  Anatomic Location Affected:  left shoulder  Morphological Description:  subcutaneous nodule  Pertinent Positives:  Pertinent Negatives:    Additional History of Present Condition:  patient notes tender with touch    Assessment and Plan:  Based on a thorough discussion of this condition and the management approach to it (including a comprehensive discussion of the known risks, side effects and potential benefits of treatment), the patient (family) agrees to implement the following specific plan:  Discussed excision to have removed    DERMATOFIBROMA    Physical Exam:  Anatomic Location Affected:  left knee  Morphological Description:  firm pink papule - round   Pertinent Positives:  Pertinent Negatives:    Additional History of Present Condition:  present for many years    Assessment and Plan:  Based on a thorough discussion of this condition and the management approach to it (including a comprehensive discussion of the known risks, side effects and potential benefits of treatment), the patient (family) agrees to implement the following specific plan:  Reassure benign    Assessment and Plan:  A dermatofibroma is a common benign fibrous nodule that most often arises on the skin of the lower legs.  A dermatofibroma is also called a \"cutaneous fibrous histiocytoma.\"  Dermatofibromas occur at all ages and in people of every ethnicity. They are more common in women than in men.    It is not clear if dermatofibroma is a reactive process or if it is a neoplasm. The lesions are made up of proliferating fibroblasts. Histiocytes may also be involved.  They are sometimes attributed to an insect bite or ingrownhair or local trauma, but not consistently. They may be more numerous in patients with altered immunity.    Dermatofibromas most " "often occur on the legs and arms, but may also arise on the trunk or any site of the body.  Typical clinical features include the following:  People may have 1 or up to 15 lesions.  Size varies from 0.5-1.5 cm diameter; most lesions are 7-10 mm diameter.  They are firm nodules tethered to the skin surface and mobile over subcutaneous tissue.  The skin \"dimples\" on pinching the lesion.  Color may be pink to light brown in white skin, and dark brown to black in dark skin; some appear paler in the center.  They do not usually cause symptoms, but they are sometimes painful or itchy.  Because they are often raised lesions, they may be traumatized, for example by a razor.  Occasionally dozens may erupt within a few months, usually in the setting of immunosuppression (for example autoimmune disease, cancer or certain medications).  Dermatofibroma does not give rise to cancer. However, occasionally, it may be mistaken for dermatofibrosarcoma or desmoplastic melanoma.    A dermatofibroma is harmless and seldom causes any symptoms. Usually, only reassurance is needed. If it is nuisance or causing concern, the lesion can be removed surgically, resulting in a scar that is, by definition, usually longer in diameter than the widest portion of the dermatofibroma.  Cryotherapy, shave biopsy and laser surgery are rarely completely successful.  Skin punch biopsy or incisional biopsy may be undertaken if there is an atypical feature such as recent enlargement, ulceration, or asymmetrical structures and colours on dermatoscopy.        Scribe Attestation      I,:  Angelica Melendez MA am acting as a scribe while in the presence of the attending physician.:       I,:  Susu Jacques MD personally performed the services described in this documentation    as scribed in my presence.:             "

## 2024-09-18 NOTE — PROGRESS NOTES
"[facility] Clinic Note     Patient Name: Donald Collazo  Encounter Date: 9/18/2024     Have you been cared for by a [facility] Dermatologist in the last 3 years and, if so, which description applies to you?    Yes.  I have been here within the last 3 years, and my medical history has NOT changed since that time.  I am MALE/not capable of bearing children.    REVIEW OF SYSTEMS:  Have you recently had or currently have any of the following? No changes in my recent health.   PAST MEDICAL HISTORY:  Have you personally ever had or currently have any of the following?  If \"YES,\" then please provide more detail. No changes in my medical history.   HISTORY OF IMMUNOSUPPRESSION: Do you have a history of any of the following:  Systemic Immunosuppression such as Diabetes, Biologic or Immunotherapy, Chemotherapy, Organ Transplantation, Bone Marrow Transplantation or Prednisone?  No     Answering \"YES\" requires the addition of the dotphrase \"IMMUNOSUPPRESSED\" as the first diagnosis of the patient's visit.   FAMILY HISTORY:  Any \"first degree relatives\" (parent, brother, sister, or child) with the following?    No changes in my family's known health.   PATIENT EXPERIENCE:    Do you want the Dermatologist to perform a COMPLETE skin exam today including a clinical examination under the \"bra and underwear\" areas?  NO  If necessary, do we have your permission to call and leave a detailed message on your Preferred Phone number that includes your specific medical information?  Yes      Allergies   Allergen Reactions    Sulfa Antibiotics       Current Outpatient Medications:     Triamcinolone Acetonide 0.025 % LOTN, Apply topically 2 (two) times a day Apply sparingly twice a day. Do not use more then 10 times a month (Patient taking differently: Apply topically if needed Apply sparingly twice a day. Do not use more then 10 times a month), Disp: 60 mL, Rfl: 3        Whom besides the patient is providing clinical " "information about today's encounter?   NO ADDITIONAL HISTORIAN (patient alone provided history)    Physical Exam and Assessment/Plan by Diagnosis:      ALDO (\"SKIN TAG\")    Physical Exam:  Anatomic Location Affected:  right axilla, neck, left inner thigh  Morphological Description: skin colored to light brown papules     Additional History of Present Condition:  patient presents for cosmetic removal    Assessment and Plan:  Based on a thorough discussion of this condition and the management approach to it (including a comprehensive discussion of the known risks, side effects and potential benefits of treatment), the patient (family) agrees to implement the following specific plan:  Snip removal done today    Skin tags are common, soft, harmless skin lesions that are also called, in the appropriate settings, papillomas, fibroepithelial polyps, and soft fibromas.  They are made up of loosely arranged collagen fibers and blood vessels surrounded by a thickened or thinned-out epidermis.    Skin tags tend to develop in both men and women as we grow older.  They are usually found on the skin folds (neck, armpits, groin).  It is not known what specifically causes skin tags.  Certain factors, though, do appear to play a role:  Chaffing and irritation from skin rubbing together  High levels of growth factors (as seen, for example, in pregnancy or in acromegaly/gigantism)  Insulin resistance  Human papillomavirus (wart virus)    We discussed that most skin tags do not need to be treated unless they are specifically causing the patient physical distress or limitation or pose a risk for a larger problem such as an infection that forms secondary to excoriation or chronic irritation.    Cosmetic Note      PROCEDURE # Removal of skin tags with snip removal   After a thorough discussion of treatment options and risk/benefits/alternatives (including but not limited to local pain, scarring, dyspigmentation, " persistance/recurrence of lesions), verbal and written consent were obtained and the aforementioned lesions were treated on with snip removal after anesthesia with lidocaine with 1:200,000 epinephrine. Electrocautery and aluminum chloride was used for control of minimal bleeding.   TOTAL NUMBER of  10 lesions were treated today on the ANATOMIC LOCATION: right axilla, neck, left inner thigh.              The patient tolerated the procedure well, and verbal after-care instructions were provided.     This was cosmetic visit that patient paid out of pocket for: $150 charge for up to 10 lesions     DO NOT BILL INSURANCE    Scribe Attestation      I,:  Angelica Melendez MA am acting as a scribe while in the presence of the attending physician.:       I,:  Susu Jacques MD personally performed the services described in this documentation    as scribed in my presence.:

## 2024-12-20 ENCOUNTER — PROCEDURE VISIT (OUTPATIENT)
Dept: DERMATOLOGY | Facility: CLINIC | Age: 34
End: 2024-12-20
Payer: COMMERCIAL

## 2024-12-20 VITALS
OXYGEN SATURATION: 98 % | BODY MASS INDEX: 31.55 KG/M2 | WEIGHT: 213 LBS | HEART RATE: 107 BPM | DIASTOLIC BLOOD PRESSURE: 88 MMHG | SYSTOLIC BLOOD PRESSURE: 134 MMHG | HEIGHT: 69 IN | TEMPERATURE: 97.9 F

## 2024-12-20 DIAGNOSIS — L72.0 EPIDERMAL INCLUSION CYST: Primary | ICD-10-CM

## 2024-12-20 PROCEDURE — 88307 TISSUE EXAM BY PATHOLOGIST: CPT | Performed by: STUDENT IN AN ORGANIZED HEALTH CARE EDUCATION/TRAINING PROGRAM

## 2024-12-20 PROCEDURE — 88342 IMHCHEM/IMCYTCHM 1ST ANTB: CPT | Performed by: STUDENT IN AN ORGANIZED HEALTH CARE EDUCATION/TRAINING PROGRAM

## 2024-12-20 PROCEDURE — 11402 EXC TR-EXT B9+MARG 1.1-2 CM: CPT | Performed by: STUDENT IN AN ORGANIZED HEALTH CARE EDUCATION/TRAINING PROGRAM

## 2024-12-20 PROCEDURE — 88341 IMHCHEM/IMCYTCHM EA ADD ANTB: CPT | Performed by: STUDENT IN AN ORGANIZED HEALTH CARE EDUCATION/TRAINING PROGRAM

## 2024-12-20 PROCEDURE — 12032 INTMD RPR S/A/T/EXT 2.6-7.5: CPT | Performed by: STUDENT IN AN ORGANIZED HEALTH CARE EDUCATION/TRAINING PROGRAM

## 2024-12-20 NOTE — PROGRESS NOTES
"  PROCEDURE:  EXCISION NOTE     Procedural Plan:     Attending:   Assistant:  Estephanie LIVINGSTON  Lesion Anatomic Location (use description from previous biopsy if applicable): Left shoulder  Pre-Op Diagnosis: Neuroma   Lesion is being treated as \"benign\" or \"MALIGNANT\": benign  Accession Number of any associated previous biopsy/excision: N/A    Written and verbal (witnessed) informed consent was obtained. We discussed that \"excision\" is a method of removing lesions, both benign and malignant lesions.  A portion of normal skin is often taken to ensure completeness of removal.  I reviewed that this procedure will include numbing the area, cutting around and under the skin lesion, undermining (\"freeing up\") surrounding tissue, and closing the wound with sutures (stitches) both inside and out.  Risks include and are not limited to the following:  Bleeding, pain, infection, scarring, recurrence, more required treatment, no additional information, numbness and/or loss of function (if nerves are damaged).  These risks were considered against the benefits that we discussed, and the patient opted to continue with the procedure. It was discussed with patient that every effort is made to minimize scarring, but scarring is influenced also by extrinsic factor such as location, age and genetics.     Procedural Time Out:      Correct patient? yes  Correct site per Clinic Report? yes  Correct site per Patient's recollection? yes    Anesthesia:      Local anesthesia: 1% xylocaine with epi     Excision Description:      Post-Op Diagnosis: Same as Pre-Op Diagnosis (above)  Pre-op Size: 2 cm x 1.7 cm  Margins (enter \"0\" for lipoma/cyst or similar diagnosis): 0 cm  TOTAL POSTOPERATIVE DEFECT SIZE (I.e., Pre-op Size + Margins on both sides):  2 cm x 1.7 cm    The patient was seated in the procedure/exam room, anesthetized locally, prepped and draped in the usual fashion. Using a #15 blade with a scalpel, the lesion was " "excised in elliptical fashion.     REQUIRED [facility] MELANOMA DATA      This procedure was not performed to treat primary cutaneous melanoma through wide local excision      Closure Description:      The specific type of closure that was utilized:     INTERMEDIATE Closure  INTERMEDIATE CLOSURE     The patient was brought back into the procedure room, anesthetized locally, prepped and draped in the usual fashion. Using a #15 blade with a scalpel, the lesion was excised in elliptical fashion. The wound was  undermined in the fascial plane.  Purpose of undermining was to decrease wound tension and facilitate closure. Hemostasis was achieved with light electrocoagulation.    The wound was closed with subcutaneous sutures as follows:    Deep Suture Throw, Size, and Type (select all that apply):  Interrupted Deeps; 3-0; vicryl     Epidermal edge closure was accomplished with superficial sutures as follows:    Superficial Suture Throw, Size and Type (select all that apply):  Simple Interrupted; 4-0; Prolene    FINAL LENGTH OF CLOSURE (please enter a length even for lipoma/cyst or similar diagnosis): 3.5 cm       Postoperative Care:      The wound was cleaned with sterile saline, dried off, surgical vaseline ointment was applied, and the wound was covered. A pressure dressing was applied for stabilization and light pressure.    Estimated blood loss:  Less than 3ml.  Complications: none  Post-op medications: none  Additional notes: none    Discharge Plans:      Discharge plans: Plan for return to us for suture removal, as scheduled in 14 days.   Patient condition at discharge: STABLE    The patient was given detailed oral and written instructions on postoperative care as detailed in consent. We urged the patient to call us if any problems or question should arise.         Please complete this section and then \"cut and paste\" it into the Patient Instructions section.  These notes should be printed and shared directly with the " "patient for review PRIOR TO leaving our office.         YOUR \"AFTER SURGERY\" REVIEW & INSTRUCTIONS    What to Know About Your Procedure  An \"excision\" was performed today to allow the dermatologist to remove a skin lesion. The procedure involves a local numbing medication and removing the entire lesion (or as much as possible). Typically, the lesion is being removed because it does not look \"normal,\" because it is becoming irritated and traumatized, or for significant appearance reasons.  The skin was cut deeply and then repaired - usually with sutures (stitches).  The removed tissue has been sent to the pathologist who will confirm the diagnosis and verify if the lesion has been completely removed.  Surgical “Vaseline-type” ointment has been applied after the procedure to help create a barrier between your wound and the outside world.     The advantage of using sutures (stitches) to repair a skin excision is that it allows the lesion to heal as quickly as possible with the least amount of scarring and risk of infection, Still, there are some risks and potential complications that you should watch out for that include but are not limited to the following:    Some bleeding is normal at the time of procedure and some bleeding on the gauze bandage after the procedure is normal for the first few days after surgery.  Profuse bleeding or bleeding with swelling and pain is NOT normal and should be reported as detailed below.  Infection is uncommon after skin surgery.  Infection should be reported and is indicated by pain, redness, and discharge of purulent material.  Some pain may occur initially the day after surgery.  Persistent pain or increasing pain days after surgery is not expected and should be reported.  Every effort is made to minimize scar, but location, size, and genetics do play a role in scar appearance.  A surgical wound does not achieve its optimal appearance until 6 months.  There are several treatments " "available if scarring would be problematic including scar creams, silicone pad, laser and scar revision.  Skin discoloration can occur especially in people of color.  Its important to avoid sun on wound in first 6 months after surgery.  Treatment is available if pigment is problematic.  Incomplete removal of the lesion or recurrence of lesion can occur and - depending on the lesion - this would then require further treatment and more surgery.  Nerve damage/numbness and/or loss of function is very rare, but is most likely to occur if the lesion being removed is large or if it is in a \"high risk\" location.  Allergic reaction to lidocaine is rare.  More commonly, epinephrine is used with the lidocaine, and, occasionally, epinephrine (a.k.a., adrenalin) may cause a brief feeling of anxiety or jitteriness.  The person at the microscope (pathologist) may provide additional information that was unexpected. This unexpected finding could prompt the need for additional treatment or evaluation.    At-Home Wound Care  Try NOT to remove the pressure bandage for 48 hours. Keep the area clean and dry while this bandage is on.   After removing the bandage for the first time, gently clean the area with soap and water. If the bandage is difficult to remove, getting the bandage wet in the shower will sometimes help soften the adhesive and allow it to be removed more easily.   You will now need to cleanse this area daily in the shower with gentle soap. There is no need to scrub the area. You will need to apply plain Vaseline ointment (this is over the counter and not a prescription) to the site for up to 2 weeks followed by a clean appropriately sized bandage to area.  Non stick dressing and paper tape (or Hypafix) are recommended for sensitive skin but a bandaid is fine if it covers the area well.  In general, sutures (stitches) are removed in about 5-7 days for face wounds and in about 12-14 days for the body wounds.  Your " "dermatologist wants you to return for suture removal in 14 DAYS.     General Restrictions  For TWO (2) DAYS:  You will need to take it very easy as this time is highest risk for bleeding. Being a \"couch potato\" during these two days is generally recommended.   For surgeries on the face/neck/scalp: Avoid leaning down to pick things up off the floor as this brings blood up to your head. Instead, squat down to pick things up.     For TWO WEEKS (14 DAYS):   No heavy lifting (anything greater than 10 pounds)   You can start to do slow, gentle activities such as slow walking but nothing to increase your heart rate and blood pressure too much (such as cardiovascular exercise).  It is important to take it easy as there is still a risk for bleeding and a high risk popping of stitches open during this time.     Site Specific Restrictions  If we did surgery near your eyes (including the nose, forehead, front part of your scalp, cheeks): It is VERY common to get a large amount of swelling around your eyes (puffy eyes). Although less frequent, this can be enough to swell your eyes shut and can also come along with bruising. This should not hurt and is very expected and normal. It is typically worst at ~ 3 days out from your surgery and dramatically better 1 week post-operatively.   If we did surgery around your nose: No blowing your nose as this puts you at higher risk of popping stitches durign this time. Instead dab under your nose with a tissue or use a Q-tip inside your nose.  If we did surgery on the skin above or below your lip or your lip itself: No sipping from straws as this uses a lot of the muscles around your mouth and increases the risk of popping stitches during this time.    Managing Your Pain After Surgery  You can expect to have some pain after surgery. This is normal. The pain is typically worse the first two days after surgery, and quickly begins to get better.   You can use heating pads or ice packs on your " "incisions to help reduce your pain.   The best strategy for controlling your pain after surgery is \"around the clock\" pain control. You can take \"over-the-counter\" (non-prescription) Acetaminophen (Tylenol) for discomfort, unless you have been told not to by your physician.  If you are taking Tylenol at the maximum dose, you can alternate Tylenol with Advil/Motrin (ibuprofen) as long as there are no contraindications.  Alternating these medications with each other (I.e., Tylenol followed by Motrin/Advil) allows you to maximize your pain control.  To alternate these medications properly, you will take a dose of pain medication every three hours, alternating Tylenol (acetaminophen) and Advil/Motrin (ibuprofen).  Start by taking 650 mg of Tylenol (2 pills of 325 mg)  3 hours later take 600 mg of Motrin (3 pills of 200 mg)  3 hours after taking the Motrin take 650 mg of Tylenol  3 hours after that take 600 mg of Motrin.    As an example, if your first dose of Tylenol (acetaminophen) is at 12:00 PM, then you would alternate with Motrin as directed below, continuing usually for no more than a total of 48 hours straight:     12:00 PM  Tylenol 650 mg (2 pills of 325 mg)    3:00 PM  Motrin 600 mg (3 pills of 200 mg)    6:00 PM  Tylenol 650 mg (2 pills of 325 mg)    9:00 PM  Motrin 600 mg (3 pills of 200 mg)      WARNING:  Do NOT take more than 4000 mg of Tylenol or 3200 mg of Motrin in a \"24-hour\" period.       What if you still have pain?    If you have pain that is not controlled with the over-the-counter pain medications (Tylenol and Motrin/Advil), do not hesitate to call our staff using the number provided. We will help make sure you are managing your pain in the best way possible, and if necessary, we can provide a prescription for additional pain medication.     Call our office IMMEDIATELY with any signs of wound infection.  This includes fever, chills, increasing redness, warmth, tenderness, severe discomfort/pain, or " pus or foul smell coming from the wound. . [facility] can be directly at [phone] (SKIN) and ask for the on-call Dermatologist covering surgery/Mohs.    If Bleeding is Noticed  If bleeding is soaking through the bandage, remove the bandage and see where the bleeding is coming from.  Place a clean cloth over the area and apply firm pressure directly to the area that is bleeding for thirty minutes.    Check the wound ONLY after 30 minutes of direct pressure; do not cheat and sneak a peak, as that does not count (i.e., resets the clock back to zero).  If bleeding persists after 30 minutes of legitimate direct pressure, then try one more round of direct pressure to the area.    Should bleeding become heavier or not stop after the second application of direct pressure for 30 minutes, then call . [facility] directly at [phone] (SKIN) and ask to speak with the on-call Dermatologist covering surgery/Mohs.  If after hours, go to your nearest Emergency Room or Urgent Care and have the team call [facility] directly at [phone] (SKIN); you will be connected to our after hours team.

## 2024-12-20 NOTE — PATIENT INSTRUCTIONS
"  YOUR \"AFTER SURGERY\" REVIEW & INSTRUCTIONS    What to Know About Your Procedure  An \"excision\" was performed today to allow the dermatologist to remove a skin lesion. The procedure involves a local numbing medication and removing the entire lesion (or as much as possible). Typically, the lesion is being removed because it does not look \"normal,\" because it is becoming irritated and traumatized, or for significant appearance reasons.  The skin was cut deeply and then repaired - usually with sutures (stitches).  The removed tissue has been sent to the pathologist who will confirm the diagnosis and verify if the lesion has been completely removed.  Surgical “Vaseline-type” ointment has been applied after the procedure to help create a barrier between your wound and the outside world.     The advantage of using sutures (stitches) to repair a skin excision is that it allows the lesion to heal as quickly as possible with the least amount of scarring and risk of infection, Still, there are some risks and potential complications that you should watch out for that include but are not limited to the following:    Some bleeding is normal at the time of procedure and some bleeding on the gauze bandage after the procedure is normal for the first few days after surgery.  Profuse bleeding or bleeding with swelling and pain is NOT normal and should be reported as detailed below.  Infection is uncommon after skin surgery.  Infection should be reported and is indicated by pain, redness, and discharge of purulent material.  Some pain may occur initially the day after surgery.  Persistent pain or increasing pain days after surgery is not expected and should be reported.  Every effort is made to minimize scar, but location, size, and genetics do play a role in scar appearance.  A surgical wound does not achieve its optimal appearance until 6 months.  There are several treatments available if scarring would be problematic including " "scar creams, silicone pad, laser and scar revision.  Skin discoloration can occur especially in people of color.  Its important to avoid sun on wound in first 6 months after surgery.  Treatment is available if pigment is problematic.  Incomplete removal of the lesion or recurrence of lesion can occur and - depending on the lesion - this would then require further treatment and more surgery.  Nerve damage/numbness and/or loss of function is very rare, but is most likely to occur if the lesion being removed is large or if it is in a \"high risk\" location.  Allergic reaction to lidocaine is rare.  More commonly, epinephrine is used with the lidocaine, and, occasionally, epinephrine (a.k.a., adrenalin) may cause a brief feeling of anxiety or jitteriness.  The person at the microscope (pathologist) may provide additional information that was unexpected. This unexpected finding could prompt the need for additional treatment or evaluation.    At-Home Wound Care  Try NOT to remove the pressure bandage for 48 hours. Keep the area clean and dry while this bandage is on.   After removing the bandage for the first time, gently clean the area with soap and water. If the bandage is difficult to remove, getting the bandage wet in the shower will sometimes help soften the adhesive and allow it to be removed more easily.   You will now need to cleanse this area daily in the shower with gentle soap. There is no need to scrub the area. You will need to apply plain Vaseline ointment (this is over the counter and not a prescription) to the site for up to 2 weeks followed by a clean appropriately sized bandage to area.  Non stick dressing and paper tape (or Hypafix) are recommended for sensitive skin but a bandaid is fine if it covers the area well.  In general, sutures (stitches) are removed in about 5-7 days for face wounds and in about 12-14 days for the body wounds.  Your dermatologist wants you to return for suture removal in 14 " "DAYS.     General Restrictions  For TWO (2) DAYS:  You will need to take it very easy as this time is highest risk for bleeding. Being a \"couch potato\" during these two days is generally recommended.   For surgeries on the face/neck/scalp: Avoid leaning down to pick things up off the floor as this brings blood up to your head. Instead, squat down to pick things up.     For TWO WEEKS (14 DAYS):   No heavy lifting (anything greater than 10 pounds)   You can start to do slow, gentle activities such as slow walking but nothing to increase your heart rate and blood pressure too much (such as cardiovascular exercise).  It is important to take it easy as there is still a risk for bleeding and a high risk popping of stitches open during this time.     Site Specific Restrictions  If we did surgery near your eyes (including the nose, forehead, front part of your scalp, cheeks): It is VERY common to get a large amount of swelling around your eyes (puffy eyes). Although less frequent, this can be enough to swell your eyes shut and can also come along with bruising. This should not hurt and is very expected and normal. It is typically worst at ~ 3 days out from your surgery and dramatically better 1 week post-operatively.   If we did surgery around your nose: No blowing your nose as this puts you at higher risk of popping stitches durign this time. Instead dab under your nose with a tissue or use a Q-tip inside your nose.  If we did surgery on the skin above or below your lip or your lip itself: No sipping from straws as this uses a lot of the muscles around your mouth and increases the risk of popping stitches during this time.    Managing Your Pain After Surgery  You can expect to have some pain after surgery. This is normal. The pain is typically worse the first two days after surgery, and quickly begins to get better.   You can use heating pads or ice packs on your incisions to help reduce your pain.   The best strategy for " "controlling your pain after surgery is \"around the clock\" pain control. You can take \"over-the-counter\" (non-prescription) Acetaminophen (Tylenol) for discomfort, unless you have been told not to by your physician.  If you are taking Tylenol at the maximum dose, you can alternate Tylenol with Advil/Motrin (ibuprofen) as long as there are no contraindications.  Alternating these medications with each other (I.e., Tylenol followed by Motrin/Advil) allows you to maximize your pain control.  To alternate these medications properly, you will take a dose of pain medication every three hours, alternating Tylenol (acetaminophen) and Advil/Motrin (ibuprofen).  Start by taking 650 mg of Tylenol (2 pills of 325 mg)  3 hours later take 600 mg of Motrin (3 pills of 200 mg)  3 hours after taking the Motrin take 650 mg of Tylenol  3 hours after that take 600 mg of Motrin.    As an example, if your first dose of Tylenol (acetaminophen) is at 12:00 PM, then you would alternate with Motrin as directed below, continuing usually for no more than a total of 48 hours straight:     12:00 PM  Tylenol 650 mg (2 pills of 325 mg)    3:00 PM  Motrin 600 mg (3 pills of 200 mg)    6:00 PM  Tylenol 650 mg (2 pills of 325 mg)    9:00 PM  Motrin 600 mg (3 pills of 200 mg)      WARNING:  Do NOT take more than 4000 mg of Tylenol or 3200 mg of Motrin in a \"24-hour\" period.       What if you still have pain?    If you have pain that is not controlled with the over-the-counter pain medications (Tylenol and Motrin/Advil), do not hesitate to call our staff using the number provided. We will help make sure you are managing your pain in the best way possible, and if necessary, we can provide a prescription for additional pain medication.     Call our office IMMEDIATELY with any signs of wound infection.  This includes fever, chills, increasing redness, warmth, tenderness, severe discomfort/pain, or pus or foul smell coming from the wound. [facility] can be directly at [phone] (SKIN) and ask for the on-call Dermatologist covering surgery/Mohs.    If Bleeding is Noticed  If bleeding is soaking through the bandage, remove the bandage and see where the bleeding is coming from.  Place a clean cloth over the area and apply firm pressure directly to the area that is bleeding for thirty minutes.    Check the wound ONLY after 30 minutes of direct pressure; do not cheat and sneak a peak, as that does not count (i.e., resets the clock back to zero).  If bleeding persists after 30 minutes of legitimate direct pressure, then try one more round of direct pressure to the area.    Should bleeding become heavier or not stop after the second application of direct pressure for 30 minutes, then call [facility] directly at [phone] (SKIN) and ask to speak with the on-call Dermatologist covering surgery/Mohs.  If after hours, go to your nearest Emergency Room or Urgent Care and have the team call [facility] directly at [phone] (SKIN); you will be connected to our after hours team.

## 2024-12-27 ENCOUNTER — RESULTS FOLLOW-UP (OUTPATIENT)
Dept: DERMATOLOGY | Facility: CLINIC | Age: 34
End: 2024-12-27

## 2024-12-27 PROCEDURE — 88307 TISSUE EXAM BY PATHOLOGIST: CPT | Performed by: STUDENT IN AN ORGANIZED HEALTH CARE EDUCATION/TRAINING PROGRAM

## 2024-12-27 PROCEDURE — 88342 IMHCHEM/IMCYTCHM 1ST ANTB: CPT | Performed by: STUDENT IN AN ORGANIZED HEALTH CARE EDUCATION/TRAINING PROGRAM

## 2024-12-27 PROCEDURE — 88341 IMHCHEM/IMCYTCHM EA ADD ANTB: CPT | Performed by: STUDENT IN AN ORGANIZED HEALTH CARE EDUCATION/TRAINING PROGRAM

## 2025-01-07 ENCOUNTER — OFFICE VISIT (OUTPATIENT)
Dept: RHEUMATOLOGY | Facility: CLINIC | Age: 35
End: 2025-01-07

## 2025-01-07 VITALS
HEART RATE: 104 BPM | OXYGEN SATURATION: 97 % | BODY MASS INDEX: 31.4 KG/M2 | WEIGHT: 212 LBS | SYSTOLIC BLOOD PRESSURE: 142 MMHG | HEIGHT: 69 IN | DIASTOLIC BLOOD PRESSURE: 88 MMHG

## 2025-01-07 DIAGNOSIS — M25.50 ARTHRALGIA, UNSPECIFIED JOINT: Primary | ICD-10-CM
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
## 2025-04-28 ENCOUNTER — OFFICE VISIT (OUTPATIENT)
Dept: DERMATOLOGY | Facility: CLINIC | Age: 35
End: 2025-04-28
Payer: COMMERCIAL

## 2025-04-28 VITALS — BODY MASS INDEX: 31.31 KG/M2 | WEIGHT: 212 LBS

## 2025-04-28 DIAGNOSIS — D22.5 MULTIPLE BENIGN MELANOCYTIC NEVI OF BOTH UPPER EXTREMITIES, BOTH LOWER EXTREMITIES, AND TRUNK: Primary | ICD-10-CM

## 2025-04-28 DIAGNOSIS — D22.62 MULTIPLE BENIGN MELANOCYTIC NEVI OF BOTH UPPER EXTREMITIES, BOTH LOWER EXTREMITIES, AND TRUNK: Primary | ICD-10-CM

## 2025-04-28 DIAGNOSIS — D48.5 NEOPLASM OF UNCERTAIN BEHAVIOR OF SKIN: ICD-10-CM

## 2025-04-28 DIAGNOSIS — D22.72 MULTIPLE BENIGN MELANOCYTIC NEVI OF BOTH UPPER EXTREMITIES, BOTH LOWER EXTREMITIES, AND TRUNK: Primary | ICD-10-CM

## 2025-04-28 DIAGNOSIS — D22.71 MULTIPLE BENIGN MELANOCYTIC NEVI OF BOTH UPPER EXTREMITIES, BOTH LOWER EXTREMITIES, AND TRUNK: Primary | ICD-10-CM

## 2025-04-28 DIAGNOSIS — D22.61 MULTIPLE BENIGN MELANOCYTIC NEVI OF BOTH UPPER EXTREMITIES, BOTH LOWER EXTREMITIES, AND TRUNK: Primary | ICD-10-CM

## 2025-04-28 PROCEDURE — 88312 SPECIAL STAINS GROUP 1: CPT | Performed by: STUDENT IN AN ORGANIZED HEALTH CARE EDUCATION/TRAINING PROGRAM

## 2025-04-28 PROCEDURE — 88305 TISSUE EXAM BY PATHOLOGIST: CPT | Performed by: STUDENT IN AN ORGANIZED HEALTH CARE EDUCATION/TRAINING PROGRAM

## 2025-04-28 PROCEDURE — 88313 SPECIAL STAINS GROUP 2: CPT | Performed by: STUDENT IN AN ORGANIZED HEALTH CARE EDUCATION/TRAINING PROGRAM

## 2025-04-28 PROCEDURE — 99214 OFFICE O/P EST MOD 30 MIN: CPT | Performed by: STUDENT IN AN ORGANIZED HEALTH CARE EDUCATION/TRAINING PROGRAM

## 2025-04-28 PROCEDURE — 88342 IMHCHEM/IMCYTCHM 1ST ANTB: CPT | Performed by: STUDENT IN AN ORGANIZED HEALTH CARE EDUCATION/TRAINING PROGRAM

## 2025-04-28 PROCEDURE — 11104 PUNCH BX SKIN SINGLE LESION: CPT | Performed by: STUDENT IN AN ORGANIZED HEALTH CARE EDUCATION/TRAINING PROGRAM

## 2025-04-28 PROCEDURE — 88341 IMHCHEM/IMCYTCHM EA ADD ANTB: CPT | Performed by: STUDENT IN AN ORGANIZED HEALTH CARE EDUCATION/TRAINING PROGRAM

## 2025-04-28 NOTE — PATIENT INSTRUCTIONS
Procedure Details      Patient informed of the risks (including bleeding,scaring and infection) and benefits of the procedure explained. Verbal and written informed consent obtained. The area was prepped and draped in the usual fashion. Anesthesia was obtained with 1% lidocaine with epinephrine. The skin was then stretched perpendicular to the skin tension lines and a punch biopsy to an appropriate sampling depth was obtained with a 5 mm punch with a forceps and iris scissors.      Hemostasis was obtained with 4-0 Prolene x 2 sutures.                Complications:  None        Specimen has been sent for review by Dermatopathology.        Plan:  1. Instructed to keep the wound dry and covered for 24-48h and clean thereafter.  2. Warning signs of infection were reviewed.    3. Recommended that the patient use acetaminophen as needed for pain  4. Sutures if any should be removed in 10 days        Standard post-procedure care has been explained and has been included in written form within the patient's copy of Informed Consent.

## 2025-04-28 NOTE — PROGRESS NOTES
"[facility] Note     Patient Name: Donald Collazo  Encounter Date: 4/28/25       Have you been cared for by a [facility] Dermatologist in the last 3 years and, if so, which description applies to you? Yes. I have been here within the last 3 years, and my medical history has NOT changed since that time. I am not of child-bearing potential.     REVIEW OF SYSTEMS:  Have you recently had or currently have any of the following? No changes in my recent health.   PAST MEDICAL HISTORY:  Have you personally ever had or currently have any of the following?  If \"YES,\" then please provide more detail. No changes in my medical history.   HISTORY OF IMMUNOSUPPRESSION: Do you have a history of any of the following:  Systemic Immunosuppression such as Diabetes, Biologic or Immunotherapy, Chemotherapy, Organ Transplantation, Bone Marrow Transplantation or Prednisone?  No     Answering \"YES\" requires the addition of the dotphrase \"IMMUNOSUPPRESSED\" as the first diagnosis of the patient's visit.   FAMILY HISTORY:  Any \"first degree relatives\" (parent, brother, sister, or child) with the following?    No changes in my family's known health.   PATIENT EXPERIENCE:    Do you want the Dermatologist to perform a COMPLETE skin exam today including a clinical examination under the \"bra and underwear\" areas?  Yes  If necessary, do we have your permission to call and leave a detailed message on your Preferred Phone number that includes your specific medical information?  Yes      Allergies   Allergen Reactions   • Sulfa Antibiotics       Current Outpatient Medications:   •  Triamcinolone Acetonide 0.025 % LOTN, Apply topically 2 (two) times a day Apply sparingly twice a day. Do not use more then 10 times a month, Disp: 60 mL, Rfl: 3              Whom besides the patient is providing clinical information about today's encounter?   NO ADDITIONAL HISTORIAN (patient alone provided history)    Physical Exam and " "Assessment/Plan by Diagnosis:  NEOPLASM OF UNCERTAIN BEHAVIOR OF SKIN    Physical Exam:  (Anatomic Location); (Size and Morphological Description); (Differential Diagnosis):  Specimen A: right lower back; 6 mm skin colored papule; DDX: Dermatofibroma  Pertinent Positives:  Pertinent Negatives:    Additional History of Present Condition:  Patient states lesion is very itchy but sure if there is any change is size or color    Assessment and Plan:  I have discussed with the patient that a sample of skin via a \"skin biopsy” would be potentially helpful to further make a specific diagnosis under the microscope.  Based on a thorough discussion of this condition and the management approach to it (including a comprehensive discussion of the known risks, side effects and potential benefits of treatment), the patient (family) agrees to implement the following specific plan:    Procedure:  Skin Biopsy.  After a thorough discussion of treatment options and risk/benefits/alternatives (including but not limited to local pain, scarring, dyspigmentation, blistering, possible superinfection, and inability to confirm a diagnosis via histopathology), verbal and written consent were obtained and portion of the rash was biopsied for tissue sample.  See below for consent that was obtained from patient and subsequent Procedure Note.    PROCEDURE NOTE:  PUNCH BIOPSY      Performing Physician:     Anatomic Location; Clinical Description with size (cm); Pre-Op Diagnosis:    Specimen A: right lower back; 6 mm skin colored papule; DDX: Dermatofibroma        Anesthesia: 1% xylocaine with epi       Topical anesthesia: None       Indications: To indicate diagnosis and management plan.    Procedure Details     Patient informed of the risks (including bleeding,scaring and infection) and benefits of the procedure explained. Verbal and written informed consent obtained. The area was prepped and draped in the usual fashion. Anesthesia was " obtained with 1% lidocaine with epinephrine. The skin was then stretched perpendicular to the skin tension lines and a punch biopsy to an appropriate sampling depth was obtained with a 5 mm punch with a forceps and iris scissors.     Hemostasis was obtained with 4-0 Prolene x 2 sutures.     Complications:  None      Specimen has been sent for review by Dermatopathology.      Plan:  1. Instructed to keep the wound dry and covered for 24-48h and clean thereafter.  2. Warning signs of infection were reviewed.    3. Recommended that the patient use acetaminophen as needed for pain  4. Sutures if any should be removed in 10 days      Standard post-procedure care has been explained and has been included in written form within the patient's copy of Informed Consent.    MELANOCYTIC NEVI  -Relevant exam: Scattered over the trunk/extremities are homogenously pigmented brown macules and papules. ELM performed and without concerning findings.  - Exam and clinical history consistent with melanocytic nevi  - Educated on the ABCDE's of melanoma; handout provided  - Counseled to return to clinic prior to scheduled appointment should any of these lesions change or should any new lesions of concern arise  - Counseled on use of sun protection daily. Reviewed latest FDA sunscreen guidelines, including use of broad spectrum (UVA and UVB blocking) sunscreen or sun protective clothing with SPF 30-50 every 2-3 hours and reapplied after exposure to water; use of photoprotective clothing, including a broad brim hat and UPF rated clothing if outdoors for several hours; avoid use of tanning beds as these pose significant risk for melanoma and skin cancer.    Scribe Attestation    I,:  Cristy Garcia MA am acting as a scribe while in the presence of the attending physician.:       I,:  Dariel Gifford DO personally performed the services described in this documentation    as scribed in my presence.:

## 2025-05-02 PROCEDURE — 88305 TISSUE EXAM BY PATHOLOGIST: CPT | Performed by: STUDENT IN AN ORGANIZED HEALTH CARE EDUCATION/TRAINING PROGRAM

## 2025-05-02 PROCEDURE — 88341 IMHCHEM/IMCYTCHM EA ADD ANTB: CPT | Performed by: STUDENT IN AN ORGANIZED HEALTH CARE EDUCATION/TRAINING PROGRAM

## 2025-05-02 PROCEDURE — 88342 IMHCHEM/IMCYTCHM 1ST ANTB: CPT | Performed by: STUDENT IN AN ORGANIZED HEALTH CARE EDUCATION/TRAINING PROGRAM

## 2025-05-02 PROCEDURE — 88312 SPECIAL STAINS GROUP 1: CPT | Performed by: STUDENT IN AN ORGANIZED HEALTH CARE EDUCATION/TRAINING PROGRAM

## 2025-05-02 PROCEDURE — 88313 SPECIAL STAINS GROUP 2: CPT | Performed by: STUDENT IN AN ORGANIZED HEALTH CARE EDUCATION/TRAINING PROGRAM

## 2025-05-05 NOTE — PROGRESS NOTES
5/6/2025      Chief Complaint   Patient presents with   • Follow-up     1 yr f/u       Assessment and Plan    35 y.o. male managed by Ap team     1. Right flank pain  Assessment & Plan:  Has been having intermittent right flank pain does not believe that it is musculoskeletal  History of nephrolithiasis  Last passed stone in June  No imaging on file  Stone analysis reviewed   Plan proceed with CT stone study to evaluate urinary tracts given flank pain   Plan to call with results  Discussed dietray recommendations along with adequate hydration of at least 2.5 L daily for stone prevention   Dietary recommendations provided in after visit summary   Reviewed ED precautions   Patient is potentially interested in metabolic workup depending on what CT scan shows  We will call with CT scan results to discuss workup   Orders:  -     CT renal stone study abdomen pelvis wo contrast; Future; Expected date: 05/06/2025  2. Hx of urinary stone      History of Present Illness  Donald Collazo is a 35 y.o. male here for evaluation of nephrolithiasis.  Patient does report having intermittent right-sided flank pain which he does not believe to be musculoskeletal.  It randomly comes.  He last passed a stone in June.  The stone was sent off for analysis which showed to be calcium oxalate.  He has never required surgical intervention in the past.  He denies gross materia fever or chills.  Mainly drinks water throughout the day and maybe 1 to 2 cups of coffees in the morning.  He is potentially interested in metabolic workup to help prevent future stones if they are present on CT scan.        Review of Systems   Constitutional:  Negative for chills and fever.   HENT:  Negative for ear pain and sore throat.    Eyes:  Negative for pain and visual disturbance.   Respiratory:  Negative for cough and shortness of breath.    Cardiovascular:  Negative for chest pain and palpitations.   Gastrointestinal:  Negative for abdominal pain and  "vomiting.   Genitourinary:  Positive for flank pain (right- intermitent). Negative for decreased urine volume, difficulty urinating, dysuria, hematuria and urgency.   Musculoskeletal:  Negative for arthralgias and back pain.   Skin:  Negative for color change and rash.   Neurological:  Negative for seizures and syncope.   All other systems reviewed and are negative.               Vitals  Vitals:    05/06/25 0914   BP: 148/88   BP Location: Left arm   Patient Position: Sitting   Cuff Size: Adult   Pulse: (!) 114   SpO2: 97%   Weight: 91.6 kg (202 lb)   Height: 5' 9\" (1.753 m)       Physical Exam  Vitals reviewed.   Constitutional:       Appearance: Normal appearance.   HENT:      Head: Normocephalic and atraumatic.   Eyes:      Conjunctiva/sclera: Conjunctivae normal.   Pulmonary:      Effort: Pulmonary effort is normal.   Abdominal:      General: Abdomen is flat. There is no distension.      Palpations: Abdomen is soft.      Tenderness: There is no abdominal tenderness.   Musculoskeletal:         General: Normal range of motion.      Cervical back: Normal range of motion.   Skin:     General: Skin is warm and dry.   Neurological:      General: No focal deficit present.      Mental Status: He is alert and oriented to person, place, and time.   Psychiatric:         Mood and Affect: Mood normal.         Behavior: Behavior normal.         Thought Content: Thought content normal.         Judgment: Judgment normal.           Past History  Past Medical History:   Diagnosis Date   • Migraine      Social History     Socioeconomic History   • Marital status: /Civil Union     Spouse name: None   • Number of children: None   • Years of education: None   • Highest education level: None   Occupational History   • None   Tobacco Use   • Smoking status: Never   • Smokeless tobacco: Never   Vaping Use   • Vaping status: Never Used   Substance and Sexual Activity   • Alcohol use: Yes     Comment: socially    • Drug use: Never " "  • Sexual activity: None   Other Topics Concern   • None   Social History Narrative    Consumes 4-5 cups of coffee per week     Social Drivers of Health     Financial Resource Strain: Not on file   Food Insecurity: Not on file   Transportation Needs: Not on file   Physical Activity: Not on file   Stress: Not on file   Social Connections: Not on file   Intimate Partner Violence: Not on file   Housing Stability: Not on file     Social History     Tobacco Use   Smoking Status Never   Smokeless Tobacco Never     Family History   Problem Relation Age of Onset   • No Known Problems Mother    • Heart disease Father    • Hypertension Father    • Myasthenia gravis Father    • Asthma Sister    • Asthma Brother    • Hypertension Maternal Grandmother    • Seizures Maternal Grandfather    • Glaucoma Maternal Grandfather    • Hypertension Maternal Grandfather    • Leukemia Maternal Grandfather    • Migraines Paternal Grandmother    • Breast cancer Paternal Grandmother    • Hypertension Paternal Grandfather        The following portions of the patient's history were reviewed and updated as appropriate: allergies, current medications, past medical history, past social history, past surgical history and problem list.    Results  No results found for this or any previous visit (from the past hour).]  No results found for: \"PSA\"  Lab Results   Component Value Date    CALCIUM 9.9 05/10/2024    K 4.1 05/10/2024    CO2 28 05/10/2024     05/10/2024    BUN 16 05/10/2024    CREATININE 0.95 05/10/2024     Lab Results   Component Value Date    WBC 8.95 10/11/2023    HGB 15.2 10/11/2023    HCT 44.1 10/11/2023    MCV 88 10/11/2023     10/11/2023       "

## 2025-05-06 ENCOUNTER — RESULTS FOLLOW-UP (OUTPATIENT)
Age: 35
End: 2025-05-06

## 2025-05-06 ENCOUNTER — OFFICE VISIT (OUTPATIENT)
Dept: UROLOGY | Facility: CLINIC | Age: 35
End: 2025-05-06
Payer: COMMERCIAL

## 2025-05-06 VITALS
OXYGEN SATURATION: 97 % | BODY MASS INDEX: 29.92 KG/M2 | HEART RATE: 114 BPM | WEIGHT: 202 LBS | SYSTOLIC BLOOD PRESSURE: 148 MMHG | DIASTOLIC BLOOD PRESSURE: 88 MMHG | HEIGHT: 69 IN

## 2025-05-06 DIAGNOSIS — Z87.442 HX OF URINARY STONE: ICD-10-CM

## 2025-05-06 DIAGNOSIS — R10.9 RIGHT FLANK PAIN: Primary | ICD-10-CM

## 2025-05-06 PROCEDURE — 99213 OFFICE O/P EST LOW 20 MIN: CPT

## 2025-05-06 NOTE — ASSESSMENT & PLAN NOTE
Has been having intermittent right flank pain does not believe that it is musculoskeletal  History of nephrolithiasis  Last passed stone in June  No imaging on file  Stone analysis reviewed   Plan proceed with CT stone study to evaluate urinary tracts given flank pain   Plan to call with results  Discussed dietray recommendations along with adequate hydration of at least 2.5 L daily for stone prevention   Dietary recommendations provided in after visit summary   Reviewed ED precautions   Patient is potentially interested in metabolic workup depending on what CT scan shows  We will call with CT scan results to discuss workup

## 2025-05-20 ENCOUNTER — HOSPITAL ENCOUNTER (OUTPATIENT)
Dept: CT IMAGING | Facility: HOSPITAL | Age: 35
Discharge: HOME | End: 2025-05-20
Payer: COMMERCIAL

## 2025-05-20 DIAGNOSIS — R10.9 RIGHT FLANK PAIN: ICD-10-CM

## 2025-05-20 PROCEDURE — 74176 CT ABD & PELVIS W/O CONTRAST: CPT

## 2025-05-28 ENCOUNTER — RESULTS FOLLOW-UP (OUTPATIENT)
Dept: UROLOGY | Facility: CLINIC | Age: 35
End: 2025-05-28

## 2025-05-29 NOTE — TELEPHONE ENCOUNTER
----- Message from YOCASTA Nicholson sent at 5/28/2025  3:04 PM EDT -----  Please relay to patient that CT scan shows no obstructing or nonobstructing kidney stones in the left or right kidney.  There is no swelling either noted around the kidney.  If right flank pain is   ongoing would follow-up with PCP as pain does not seem to be urological.  ----- Message -----  From: Sharona, Radiology Results In  Sent: 5/28/2025   2:26 PM EDT  To: YOCASTA Sunshine